# Patient Record
Sex: FEMALE | Race: WHITE | NOT HISPANIC OR LATINO | Employment: FULL TIME | URBAN - METROPOLITAN AREA
[De-identification: names, ages, dates, MRNs, and addresses within clinical notes are randomized per-mention and may not be internally consistent; named-entity substitution may affect disease eponyms.]

---

## 2017-03-01 ENCOUNTER — OFFICE VISIT (OUTPATIENT)
Dept: DERMATOLOGY | Facility: CLINIC | Age: 52
End: 2017-03-01
Payer: COMMERCIAL

## 2017-03-01 DIAGNOSIS — D48.5 NEOPLASM OF UNCERTAIN BEHAVIOR OF SKIN OF TRUNK: ICD-10-CM

## 2017-03-01 DIAGNOSIS — L57.0 ACTINIC KERATOSES: ICD-10-CM

## 2017-03-01 DIAGNOSIS — Z85.828 HISTORY OF NONMELANOMA SKIN CANCER: Primary | ICD-10-CM

## 2017-03-01 DIAGNOSIS — D48.5 NEOPLASM OF UNCERTAIN BEHAVIOR OF SKIN OF FACE: ICD-10-CM

## 2017-03-01 DIAGNOSIS — Z80.8 FAMILY HISTORY OF MELANOMA: ICD-10-CM

## 2017-03-01 PROCEDURE — 11100 HC DESTRUCT PREMALIGNANT LESION, FIRST: CPT | Mod: 59 | Performed by: DERMATOLOGY

## 2017-03-01 PROCEDURE — 11101 HC DESTRUCT PREMALIGNANT LESION, 2-14: CPT | Performed by: DERMATOLOGY

## 2017-03-01 PROCEDURE — 88305 TISSUE EXAM BY PATHOLOGIST: CPT | Performed by: DERMATOLOGY

## 2017-03-01 PROCEDURE — 17000 DESTRUCT PREMALG LESION: CPT | Performed by: DERMATOLOGY

## 2017-03-01 PROCEDURE — 17003 DESTRUCT PREMALG LES 2-14: CPT | Performed by: DERMATOLOGY

## 2017-03-01 PROCEDURE — 99213 OFFICE O/P EST LOW 20 MIN: CPT | Mod: 25 | Performed by: DERMATOLOGY

## 2017-03-01 RX ORDER — LIDOCAINE HYDROCHLORIDE AND EPINEPHRINE 10; 10 MG/ML; UG/ML
3 INJECTION, SOLUTION INFILTRATION; PERINEURAL ONCE
Qty: 1 ML | Refills: 0 | OUTPATIENT
Start: 2017-03-01 | End: 2017-03-01

## 2017-03-01 NOTE — PATIENT INSTRUCTIONS
Cryotherapy    What is it?    Use of a very cold liquid, such as liquid nitrogen, to freeze and destroy abnormal skin cells that need to be removed    What should I expect?    Tenderness and redness    A small blister that might grow and fill with dark purple blood. There may be crusting.    More than one treatment may be needed if the lesions do not go away.    How do I care for the treated area?    Gently wash the area with your hands when bathing.    Use a thin layer of Vaseline to help with healing. You may use a Band-Aid.     The area should heal within 7-10 days and may leave behind a pink or lighter color.     Do not use an antibiotic or Neosporin ointment.     You may take acetaminophen (Tylenol) for pain.     Call your Doctor if you have:    Severe pain    Signs of infection (warmth, redness, cloudy yellow drainage, and or a bad smell)    Questions or concerns    Who should I call with questions?       [facility]: [phone]       [facility]: [phone]       For urgent needs outside of business hours call the [facility] at [phone]        and ask for the dermatology resident on call      Wound Care After a Biopsy    What is a skin biopsy?  A skin biopsy allows the doctor to examine a very small piece of tissue under the microscope to determine the diagnosis and the best treatment for the skin condition. A local anesthetic (numbing medicine)  is injected with a very small needle into the skin area to be tested. A small piece of skin is taken from the area. Sometimes a suture (stitch) is used.     What are the risks of a skin biopsy?  I will experience scar, bleeding, swelling, pain, crusting and redness. I may experience incomplete removal or recurrence. Risks of this procedure are excessive bleeding, bruising, infection, nerve damage, numbness, thick (hypertrophic or keloidal) scar and non-diagnostic biopsy.    How should I care  for my wound for the first 24 hours?    Keep the wound dry and covered for 24 hours    If it bleeds, hold direct pressure on the area for 15 minutes. If bleeding does not stop then go to the emergency room    Avoid strenuous exercise the first 1-2 days or as your doctor instructs you    How should I care for the wound after 24 hours?    After 24 hours, remove the bandage    You may bathe or shower as normal    If you had a scalp biopsy, you can shampoo as usual and can use shower water to clean the biopsy site daily    Clean the wound twice a day with gentle soap and water    Do not scrub, be gentle    Apply white petroleum/Vaseline after cleaning the wound with a cotton swab or a clean finger, and keep the site covered with a Bandaid /bandage. Bandages are not necessary with a scalp biopsy    If you are unable to cover the site with a Bandaid /bandage, re-apply ointment 2-3 times a day to keep the site moist. Moisture will help with healing    Avoid strenuous activity for first 1-2 days    Avoid lakes, rivers, pools, and oceans until the stitches are removed or the site is healed    How do I clean my wound?    Wash hands for 15 minutes with soap or use hand  before all wound care    Clean the wound with gentle soap and water    Apply white petroleum/Vaseline  to wound after it is clean    Replace the Bandaid /bandage to keep the wound covered for the first few days or as instructed by your doctor    If you had a scalp biopsy, warm shower water to the area on a daily basis should suffice    What should I use to clean my wound?     Cotton-tipped applicators (Qtips )    White petroleum jelly (Vaseline ). Use a clean new container and use Q-tips to apply.    Bandaids   as needed    Gentle soap     How should I care for my wound long term?    Do not get your wound dirty    Keep up with wound care for one week or until the area is healed.    A small scab will form and fall off by itself when the area is  completely healed. The area will be red and will become pink in color as it heals. Sun protection is very important for how your scar will turn out. Sunscreen with an SPF 30 or greater is recommended once the area is healed.    If you have stitches, stitches need to be removed in 5-7 days. You may return to our clinic for this or you may have it done locally at your doctor s office.    You should have some soreness but it should be mild and slowly go away over several days. Talk to your doctor about using tylenol for pain,    When should I call my doctor?  If you have increased:     Pain or swelling    Pus or drainage (clear or slightly yellow drainage is ok)    Temperature over 100F    Spreading redness or warmth around wound    When will I hear about my results?  The biopsy results can take 2-3 weeks to come back. The clinic will call you with the results, send you a Join The Wellness Team message, or have you schedule a follow-up clinic or phone time to discuss the results. Contact our clinics if you do not hear from us in 3 weeks.     Who should I call with questions?    [facility]: [phone]     [facility]: [phone]    For urgent needs outside of business hours call the [facility] at [phone] and ask for the dermatology resident on call

## 2017-03-01 NOTE — PROGRESS NOTES
[facility] Dermatology Note      Dermatology Problem List:  1. 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 Actinic Keratosis  -s/p Efudex 3/2015  -s/p cryotherapy  3. Seborrheic dermatitis  4. Family history of melaoma  5. NUB X2: 3/01/2017  -On right cheek. DDx: sebaceous hyperplasia, BCC, SK, AK, acne, rosacea.  -s/p Punch biopsy 3/01/2017  -On the right chest. DDx: BCC, SK, infundibular cyst.   -s/p Shave biopsy 3/01/2017    Encounter Date: Mar 1, 2017    CC:  Chief Complaint   Patient presents with     Light/dark Spots         History of Present Illness:  This 52 year old female presents as a follow up for a history of NMSC and fam hx of melanoma in mother. The patient was last seen 12/22/2015  By Dr. Peters when a shave biopsy was done for an AK on the right forehead. Today, the patient reports she has a spot on her chest near a site that was previously excised for BCC. It became swollen, tender, and sore earlier this month. In addition, pt has several spots on her face that are irritating and sore. Some are crusty. Pt is otherwise well with no additional skin concerns at this time. Pt hasn't had a skin check in 2 years and she declined a skin check today but she knows she needs to do it given her history of nmsc and family hx of melanomas.     Past Medical History:   Past Medical History   Diagnosis Date     Allergy, unspecified not elsewhere classified 2003     s/p desensitization     Anxiety      Atrial tachycardia (H)      ablation 3/24/11     Bartholin's abscess 7/09     Basal cell carcinoma of anterior chest s/p excision 11-4-14 11/4/2014     Bradycardia      Excessive or frequent menstruation 2001     s/p TVH     Transient disorder of initiating or maintaining sleep 2005     Past Surgical History   Procedure  Laterality Date     C ligate fallopian tube       Hc revise median n/carpal tunnel surg       bilateral carpal tunnel repair     Hysterectomy, vaginal  1/30/01     TVH for menorraghia     Surgical history of -   8/09     Bilat LE Laser ablation, varicose veins     Hysterectomy, pap no longer indicated  2001     Surgical history of -   3/24/11     atrial ablation     Repair hammer toe  12/19/2011     Procedure:REPAIR HAMMER TOE; Arthroplasty PIPJ 5th toe, left; Surgeon:VINAYAK PALACIOS; Location:[facility] OR     Bunionectomy  12/2/2013     Procedure: BUNIONECTOMY;  Bunionectomy, resection of arthroplasty of PIP joint right foot;  Surgeon: Vinayak Palacios DPM;  Location: [facility] OR     Arthroplasty toe(s)  12/2/2013     Procedure: ARTHROPLASTY TOE(S);;  Surgeon: Vinayak Palacios DPM;  Location:  OR     Social History:  The patient works as a . The patient uses 1-2  alcoholic beverages per week.     Family History:  There is a family history of skin cancer in the patient's mother.  There is a family history of melanoma in the patient's mother.    Medications:  No current outpatient prescriptions on file.          Allergies   Allergen Reactions     No Known Drug Allergies          Review of Systems:  -Const: The patient is generally feeling well today.   -Skin: As above in HPI. No additional skin concerns. No other lesions that are spontaneously bleeding, ulcerating, or not healing.      Physical exam:  GEN: This is a well-nourished, well developed female in no acute distress  NEURO: Alert and oriented  PSYCH: in a pleasant mood, appropriate affect  -Focused exam of the head/face, neck, and chest.   -On the right chest, just lateral to a NMSC scar is a pink slightly eroded papule.   -There are erythematous macules with overyling adherent scale on the center chest and face.   -A pink papule on the right cheek with possible arborizing telangiectasia   -No other lesions of concern on areas examined.      Impression/Plan:  1. Family history of melanoma: need a FBSE within the year.  2. History of nonmelanoma skin cancer, no clincial evidence or recurrence over the right chest except for the lesion of concern that pt came in form. Needs a FBSE within a year.  3. Neoplasm of uncertain behavior on right cheek. Differential diagnosis includes sebaceous hyperplasia, BCC, SK, AK, acne, rosacea.    Punch biopsy on right cheek:  After discussion of benefits and risks including but not limited to bleeding/bruising, pain/swelling, infection, scar, incomplete removal, nerve damage/numbness, recurrence, and non-diagnostic biopsy, written consent, verbal consent and photographs were obtained. Time-out was performed. The area was cleaned with isopropyl alcohol. 0.5mL of 1% lidocaine with epinephrine was injected to obtain adequate anesthesia of the lesion on the right cheek. A 2 mm punch biopsy was performed.  4-0 prolene sutures were utilized to approximate the epidermal edges.  White petroleum jelly/VaselineTM and a bandage was applied to the wound.  Explicit verbal and written wound care instructions were provided.  The patient left the Dermatology Clinic in good condition. The patient was counseled to follow up for suture removal in approximately 5-7 days.  4. Neoplasm of uncertain behavior on the right chest. Differential diagnosis includes BCC, SK, infundibular cyst.     Shave biopsy on right chest: After discussion of benefits and risks including but not limited to bleeding, infection, scar, incomplete removal, recurrence, and non-diagnostic biopsy, written consent, verbal consent and photographs were obtained. Time-out was performed. The area was cleaned with isopropyl alcohol. 0.5 mL of 1% lidocaine with epinephrine was injected to obtain adequate anesthesia of the lesion on the right chest. A shave biopsy was performed. Hemostasis was achieved with aluminium chloride. Vaseline and a sterile dressing were applied. The  patient tolerated the procedure and no complications were noted. The patient was provided with verbal and written post care instructions.   5. Actinic keratosis: chest and left cheek    Cryotherapy procedure note: After verbal consent and discussion of risks and benefits including but no limited to dyspigmentation/scar, blister, and pain, 2 were treated with 1-2mm freeze border for 2 cycles with liquid nitrogen. Post cryotherapy instructions were provided.       Follow up in 1 week for suture removal. Within the year for skin check.     Staff Involved:  Scribe/Staff        Scribe Disclosure:   I, Jimenez Green, am serving as a scribe to document services personally performed by Dr. Telly Krishnan, based on data collection and the provider's statements to me.     Provider Disclosure:   I have reviewed the documentation recorded by the scribe and have edited it as needed. I have personally performed the services documented here and the documentation accurately represents those services and the decisions made by me.     Telly Krishnan MD, MS    Department of Dermatology  [facility]: Phone: [phone], Fax:[phone]  [facility]: Phone: [phone], Fax: [phone]

## 2017-03-01 NOTE — MR AVS SNAPSHOT
After Visit Summary   3/1/2017    Oumou Carolina    MRN: [id number]           Patient Information     Date Of Birth          1965        Visit Information        Provider Department      3/1/2017 9:15 AM Telly Krishnan MD [facility]        Today's Diagnoses     History of nonmelanoma skin cancer    -  1    Family history of melanoma        Neoplasm of uncertain behavior of skin of face        Actinic keratoses        Neoplasm of uncertain behavior of skin of trunk          Care Instructions    Cryotherapy    What is it?    Use of a very cold liquid, such as liquid nitrogen, to freeze and destroy abnormal skin cells that need to be removed    What should I expect?    Tenderness and redness    A small blister that might grow and fill with dark purple blood. There may be crusting.    More than one treatment may be needed if the lesions do not go away.    How do I care for the treated area?    Gently wash the area with your hands when bathing.    Use a thin layer of Vaseline to help with healing. You may use a Band-Aid.     The area should heal within 7-10 days and may leave behind a pink or lighter color.     Do not use an antibiotic or Neosporin ointment.     You may take acetaminophen (Tylenol) for pain.     Call your Doctor if you have:    Severe pain    Signs of infection (warmth, redness, cloudy yellow drainage, and or a bad smell)    Questions or concerns    Who should I call with questions?       [facility]: [phone]       [facility]: [phone]       For urgent needs outside of business hours call the [facility] at [phone]        and ask for the dermatology resident on call      Wound Care After a Biopsy    What is a skin biopsy?  A skin biopsy allows the doctor to examine a very small piece of tissue under the microscope to determine the diagnosis and the best treatment for the skin  condition. A local anesthetic (numbing medicine)  is injected with a very small needle into the skin area to be tested. A small piece of skin is taken from the area. Sometimes a suture (stitch) is used.     What are the risks of a skin biopsy?  I will experience scar, bleeding, swelling, pain, crusting and redness. I may experience incomplete removal or recurrence. Risks of this procedure are excessive bleeding, bruising, infection, nerve damage, numbness, thick (hypertrophic or keloidal) scar and non-diagnostic biopsy.    How should I care for my wound for the first 24 hours?    Keep the wound dry and covered for 24 hours    If it bleeds, hold direct pressure on the area for 15 minutes. If bleeding does not stop then go to the emergency room    Avoid strenuous exercise the first 1-2 days or as your doctor instructs you    How should I care for the wound after 24 hours?    After 24 hours, remove the bandage    You may bathe or shower as normal    If you had a scalp biopsy, you can shampoo as usual and can use shower water to clean the biopsy site daily    Clean the wound twice a day with gentle soap and water    Do not scrub, be gentle    Apply white petroleum/Vaseline after cleaning the wound with a cotton swab or a clean finger, and keep the site covered with a Bandaid /bandage. Bandages are not necessary with a scalp biopsy    If you are unable to cover the site with a Bandaid /bandage, re-apply ointment 2-3 times a day to keep the site moist. Moisture will help with healing    Avoid strenuous activity for first 1-2 days    Avoid lakes, rivers, pools, and oceans until the stitches are removed or the site is healed    How do I clean my wound?    Wash hands for 15 minutes with soap or use hand  before all wound care    Clean the wound with gentle soap and water    Apply white petroleum/Vaseline  to wound after it is clean    Replace the Bandaid /bandage to keep the wound covered for the first few days or  as instructed by your doctor    If you had a scalp biopsy, warm shower water to the area on a daily basis should suffice    What should I use to clean my wound?     Cotton-tipped applicators (Qtips )    White petroleum jelly (Vaseline ). Use a clean new container and use Q-tips to apply.    Bandaids   as needed    Gentle soap     How should I care for my wound long term?    Do not get your wound dirty    Keep up with wound care for one week or until the area is healed.    A small scab will form and fall off by itself when the area is completely healed. The area will be red and will become pink in color as it heals. Sun protection is very important for how your scar will turn out. Sunscreen with an SPF 30 or greater is recommended once the area is healed.    If you have stitches, stitches need to be removed in 5-7 days. You may return to our clinic for this or you may have it done locally at your doctor s office.    You should have some soreness but it should be mild and slowly go away over several days. Talk to your doctor about using tylenol for pain,    When should I call my doctor?  If you have increased:     Pain or swelling    Pus or drainage (clear or slightly yellow drainage is ok)    Temperature over 100F    Spreading redness or warmth around wound    When will I hear about my results?  The biopsy results can take 2-3 weeks to come back. The clinic will call you with the results, send you a [employer] message, or have you schedule a follow-up clinic or phone time to discuss the results. Contact our clinics if you do not hear from us in 3 weeks.     Who should I call with questions?    [facility]: [phone]     [facility]: [phone]    For urgent needs outside of business hours call the [facility] at [phone] and ask for the dermatology resident on call          Follow-ups after your visit        Follow-up notes from your care team      Return if symptoms worsen or fail to improve.      Your next 10 appointments already scheduled     Mar 01, 2017  9:15 AM CST   Return Visit with Telly Krishnan MD   [facility]    [address] [address] [place] [id number] [phone]              Who to contact     If you have questions or need follow up information about today's clinic visit or your schedule please contact [facility] directly at [phone].  Normal or non-critical lab and imaging results will be communicated to you by [employer], letter or phone within 4 business days after the clinic has received the results. If you do not hear from us within 7 days, please contact the clinic through [employer] or phone. If you have a critical or abnormal lab result, we will notify you by phone as soon as possible.  Submit refill requests through [employer] or call your pharmacy and they will forward the refill request to us. Please allow 3 business days for your refill to be completed.          Additional Information About Your Visit        [employer] Information     [employer] gives you secure access to your electronic health record. If you see a primary care provider, you can also send messages to your care team and make appointments. If you have questions, please call your primary care clinic.  If you do not have a primary care provider, please call [phone] and they will assist you.      [employer] is an electronic gateway that provides easy, online access to your medical records. With [employer], you can request a clinic appointment, read your test results, renew a prescription or communicate with your care team.     To access your existing account, please contact your [facility] or call [phone] for assistance.        Care EveryWhere ID     This is your Care EveryWhere ID. This could be used by other organizations to access your [facility]  records  [id number]         Blood Pressure from Last 3 Encounters:   11/28/16 126/65   12/07/15 138/81   11/25/15 110/78    Weight from Last 3 Encounters:   11/28/16 75.8 kg (167 lb)   12/07/15 81.6 kg (180 lb)   02/18/15 78 kg (172 lb)              We Performed the Following     BIOPSY SKIN/SUBQ/MUC MEM, EACH ADDTL LESION     BIOPSY SKIN/SUBQ/MUC MEM, SINGLE LESION     DESTRUCT PREMALIGNANT LESION, 2-14     DESTRUCT PREMALIGNANT LESION, FIRST     Surgical pathology exam          Today's Medication Changes          These changes are accurate as of: 3/1/17  8:47 AM.  If you have any questions, ask your nurse or doctor.               Start taking these medicines.        Dose/Directions    lidocaine 1% with EPINEPHrine 1:100,000 1 %-1:300173 injection   Used for:  Neoplasm of uncertain behavior of skin of face, Neoplasm of uncertain behavior of skin of trunk        Dose:  3 mL   Inject 3 mLs into the skin once for 1 dose   Quantity:  1 mL   Refills:  0            Where to get your medicines      Some of these will need a paper prescription and others can be bought over the counter.  Ask your nurse if you have questions.     You don't need a prescription for these medications     lidocaine 1% with EPINEPHrine 1:100,000 1 %-1:362475 injection                Primary Care Provider Office Phone # Fax #    TORIBIO Rowe [facility] [phone]       [facility] [address] [facility] [address]        Thank you!     Thank you for choosing [facility]  for your care. Our goal is always to provide you with excellent care. Hearing back from our patients is one way we can continue to improve our services. Please take a few minutes to complete the written survey that you may receive in the mail after your visit with us. Thank you!             Your Updated Medication List - Protect others around you: Learn how to safely use, store and throw away your medicines at www.disposemymeds.org.           This list is accurate as of: 3/1/17  8:47 AM.  Always use your most recent med list.                   Brand Name Dispense Instructions for use    lidocaine 1% with EPINEPHrine 1:100,000 1 %-1:838489 injection     1 mL    Inject 3 mLs into the skin once for 1 dose

## 2017-03-01 NOTE — NURSING NOTE
Dermatology Rooming Note    Oumou Carolina's goals for this visit include:   Chief Complaint   Patient presents with     Light/dark Spots       Is a scribe okay for this visit:YES,     Are records needed for this visit(If yes, obtain release of information): No,      Vitals: There were no vitals taken for this visit.    Referring Provider:  ESTABLISHED PATIENT  No address on file

## 2017-03-06 LAB — COPATH REPORT: NORMAL

## 2017-03-08 ENCOUNTER — ALLIED HEALTH/NURSE VISIT (OUTPATIENT)
Dept: NURSING | Facility: CLINIC | Age: 52
End: 2017-03-08
Payer: COMMERCIAL

## 2017-03-08 DIAGNOSIS — Z48.02 VISIT FOR SUTURE REMOVAL: Primary | ICD-10-CM

## 2017-03-08 PROCEDURE — 99207 ZZC NO CHARGE NURSE ONLY: CPT

## 2017-03-08 NOTE — MR AVS SNAPSHOT
After Visit Summary   3/8/2017    Oumou Carolina    MRN: [id number]           Patient Information     Date Of Birth          1965        Visit Information        Provider Department      3/8/2017 8:00 AM NURSE ONLY [facility]        Today's Diagnoses     Visit for suture removal    -  1       Follow-ups after your visit        Your next 10 appointments already scheduled     Jun 20, 2017  8:45 AM CDT   Return Visit with Sonal Peters MD   [facility]    [id number] [place] [id number] [phone]              Who to contact     If you have questions or need follow up information about today's clinic visit or your schedule please contact [facility] directly at [phone].  Normal or non-critical lab and imaging results will be communicated to you by [employer], letter or phone within 4 business days after the clinic has received the results. If you do not hear from us within 7 days, please contact the clinic through [employer] or phone. If you have a critical or abnormal lab result, we will notify you by phone as soon as possible.  Submit refill requests through [employer] or call your pharmacy and they will forward the refill request to us. Please allow 3 business days for your refill to be completed.          Additional Information About Your Visit        [employer] Information     [employer] gives you secure access to your electronic health record. If you see a primary care provider, you can also send messages to your care team and make appointments. If you have questions, please call your primary care clinic.  If you do not have a primary care provider, please call [phone] and they will assist you.      [employer] is an electronic gateway that provides easy, online access to your medical records. With [employer], you can request a clinic appointment, read your test results, renew a prescription or  communicate with your care team.     To access your existing account, please contact your [facility] or call [phone] for assistance.        Care EveryWhere ID     This is your Care EveryWhere ID. This could be used by other organizations to access your [place] medical records  [id number]         Blood Pressure from Last 3 Encounters:   11/28/16 126/65   12/07/15 138/81   11/25/15 110/78    Weight from Last 3 Encounters:   11/28/16 75.8 kg (167 lb)   12/07/15 81.6 kg (180 lb)   02/18/15 78 kg (172 lb)              Today, you had the following     No orders found for display       Primary Care Provider Office Phone # Fax #    TORIBIO Rowe [facility] [phone]       [facility] [address] [facility] [address]        Thank you!     Thank you for choosing [facility]  for your care. Our goal is always to provide you with excellent care. Hearing back from our patients is one way we can continue to improve our services. Please take a few minutes to complete the written survey that you may receive in the mail after your visit with us. Thank you!             Your Updated Medication List - Protect others around you: Learn how to safely use, store and throw away your medicines at www.disposemymeds.org.      Notice  As of 3/8/2017  8:17 AM    You have not been prescribed any medications.

## 2017-03-08 NOTE — NURSING NOTE
Oumou presents to the clinic today for  removal of suture.  The patient has had the suture in place for 7 days.    There has been no history of infection or drainage.    O: 1 suture is seen located on the right cheek.  The wound is healing well with no signs of infection.    A: Suture removal.    P:  The suture was easily removed today.  Routine wound care discussed.  The patient will follow up as scheduled.    Nicole Medina LPN

## 2017-03-08 NOTE — NURSING NOTE
Oumou Carolina comes into clinic today at the request of Dr. Krishnan for right cheek.        This service provided today was under the direct supervision of Dr. Krishnan, who was available if needed.    Adam Rolon LPN

## 2017-05-30 ENCOUNTER — OFFICE VISIT (OUTPATIENT)
Dept: DERMATOLOGY | Facility: CLINIC | Age: 52
End: 2017-05-30
Payer: COMMERCIAL

## 2017-05-30 DIAGNOSIS — Z85.828 HISTORY OF NONMELANOMA SKIN CANCER: Primary | ICD-10-CM

## 2017-05-30 DIAGNOSIS — L81.4 SOLAR LENTIGINOSIS: ICD-10-CM

## 2017-05-30 DIAGNOSIS — Z87.2 HISTORY OF ACTINIC KERATOSIS: ICD-10-CM

## 2017-05-30 DIAGNOSIS — L90.5 SCAR: ICD-10-CM

## 2017-05-30 DIAGNOSIS — T14.8XXA EXCORIATION: ICD-10-CM

## 2017-05-30 PROCEDURE — 99213 OFFICE O/P EST LOW 20 MIN: CPT | Performed by: DERMATOLOGY

## 2017-05-30 NOTE — PROGRESS NOTES
"[facility] Dermatology Note      Dermatology Problem List:  1.Family history of melanoma (mother)  2. NMSC  -BCC, central abdomen, s/p excision 11/25/2015  -BCC, nodular, left frontal scalp, s/p Mohs 2/10/15  -SCCIS, left chest, s/p excision 2/10/15  -BCC, nodular with fibrosis, right chest, s/p excision 11/4/14   3. Actinic Keratosis  -Previous Tx: Efudex 3/2015, cryotherapy  4. Seborrheic dermatitis    Encounter Date: May 30, 2017    CC:  Chief Complaint   Patient presents with     Derm Problem     skin check, r cheek and left temple and back of rt arm          History of Present Illness:  Ms. Oumou Carolina is a 52 year old female who presents as a follow-up for history of NMSC and family history of melanoma. The patient was last seen 3/1/2017 by Dr. Krishnan when 2 AKs were treated with cryotherapy and a biopsy was performed on the right cheek and right chest. Today, the patient reports that her prior biopsy site on the right cheek  \"broke open\" 2 weeks ago and turned black-blue but now appears to be healing. She has a lesion on the left cheek, previously treated, that is still present. She has a new lesion on the right upper arm she would like evaluated. The patient reports no other lesions of concern.    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 2-10-15     Basal cell carcinoma of left frontal scalp (hairline) s/p mohs 2-10-15     Past Medical History:   Diagnosis Date     Allergy, unspecified not elsewhere classified 2003    s/p desensitization     Anxiety      Atrial tachycardia (H)     ablation 3/24/11     Bartholin's abscess 7/09     Basal cell carcinoma of anterior chest s/p excision 11-4-14 " 11/4/2014     Bradycardia      Excessive or frequent menstruation 2001    s/p TVH     Transient disorder of initiating or maintaining sleep 2005     Past Surgical History:   Procedure Laterality Date     ARTHROPLASTY TOE(S)  12/2/2013    Procedure: ARTHROPLASTY TOE(S);;  Surgeon: Vinayak Palacios DPM;  Location: [facility] OR     BUNIONECTOMY  12/2/2013    Procedure: BUNIONECTOMY;  Bunionectomy, resection of arthroplasty of PIP joint right foot;  Surgeon: Vinayak Palacios DPM;  Location: [facility] OR     C LIGATE FALLOPIAN TUBE       HC REVISE MEDIAN N/CARPAL TUNNEL SURG      bilateral carpal tunnel repair     HYSTERECTOMY, PAP NO LONGER INDICATED  2001     HYSTERECTOMY, VAGINAL  1/30/01    TVH for menorraghia     REPAIR HAMMER TOE  12/19/2011    Procedure:REPAIR HAMMER TOE; Arthroplasty PIPJ 5th toe, left; Surgeon:VINAYAK PALACIOS; Location:[facility]     SURGICAL HISTORY OF -   8/09    Bilat LE Laser ablation, varicose veins     SURGICAL HISTORY OF -   3/24/11    atrial ablation     Social History:  The patient works as a . The patient uses 1-2  alcoholic beverages per week.     Family History:  There is a family history of skin cancer in the patient's mother.  There is a family history of melanoma in the patient's mother.    Medications:  No current outpatient prescriptions on file.     Allergies   Allergen Reactions     No Known Drug Allergies      Review of Systems:  -Const: Denies change in personal or family medical history.   -Skin: As above in HPI. No additional skin concerns.    Physical exam:  GEN: This is a well developed, well-nourished female in no acute distress, in a pleasant mood.    SKIN:   -There are multiple well healed surgical scars without erythema, nodularity or telangiectasias. Declines genital exam. Nails painted  -Well healing scar on the right cheek.   -Excoriation with possibly underlying stuck on papule on the right upper arm.   -Scattered brown macules on sun exposed areas.  -No other  lesions of concern on areas examined.     Impression/Plan:  1. Family history of melanoma    ABCDs of melanoma were discussed and self skin checks were advised.     Recommend sunscreens SPF #30 or greater, protective clothing and avoidance of tanning beds.  2. History of nonmelanoma skin cancer, no clincial evidence of recurrence:  3. History of actinic keratosis, no clinical evidence of recurrence  4. Solar lentigines  5. Healing biopsy site, right cheek. Possible dehis from punch biopsy.     Discussed that site should heal and laser can be used for cosmetic treatment in the future.   6. Excoriation and Favor excoriated seborrheic keratosis, right upper arm.     Recommend Vaseline to the area.     Plan to recheck at follow up visit.     Follow up in 3 months, then 1 year for skin check, earlier for new or changing lesions.     Staff Involved:  Scribe/Staff    Scribe Disclosure:   I, Raquel Phelps, am serving as a scribe to document services personally performed by Dr. Sonal Peters, based on data collection and the provider's statements to me.     Provider Disclosure:   The documentation recorded by the scribe accurately reflects the services I personally performed and the decisions made by me.    Sonal Peters MD    Department of Dermatology  [facility]: Phone: [phone], Fax:[phone]  [facility]: Phone: [phone], Fax: [phone]

## 2017-05-30 NOTE — NURSING NOTE
Dermatology Rooming Note    Oumou Carolina's goals for this visit include:   Chief Complaint   Patient presents with     Derm Problem     skin check, r cheek and left temple and back of rt arm        Is a scribe okay for this visit:YES    Are records needed for this visit(If yes, obtain release of information): No     Vitals: There were no vitals taken for this visit.    Referring Provider:  No referring provider defined for this encounter.

## 2017-05-30 NOTE — LETTER
"5/30/2017       RE: Oumou Carolina  [address] [address]     Dear Colleague,    Thank you for referring your patient, Oumou Carolina, to the [facility]. Please see a copy of my visit note below.    [facility] Dermatology Note      Dermatology Problem List:  1.Family history of melanoma (mother)  2. NMSC  -BCC, central abdomen, s/p excision 11/25/2015  -BCC, nodular, left frontal scalp, s/p Mohs 2/10/15  -SCCIS, left chest, s/p excision 2/10/15  -BCC, nodular with fibrosis, right chest, s/p excision 11/4/14   3. Actinic Keratosis  -Previous Tx: Efudex 3/2015, cryotherapy  4. Seborrheic dermatitis    Encounter Date: May 30, 2017    CC:  Chief Complaint   Patient presents with     Derm Problem     skin check, r cheek and left temple and back of rt arm          History of Present Illness:  Ms. Oumou Carolina is a 52 year old female who presents as a follow-up for history of NMSC and family history of melanoma. The patient was last seen 3/1/2017 by Dr. Krishnan when 2 AKs were treated with cryotherapy and a biopsy was performed on the right cheek and right chest. Today, the patient reports that her prior biopsy site on the right cheek  \"broke open\" 2 weeks ago and turned black-blue but now appears to be healing. She has a lesion on the left cheek, previously treated, that is still present. She has a new lesion on the right upper arm she would like evaluated. The patient reports no other lesions of concern.    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 2-10-15     Basal cell carcinoma of left frontal scalp (hairline) s/p " mohs 2-10-15     Past Medical History:   Diagnosis Date     Allergy, unspecified not elsewhere classified 2003    s/p desensitization     Anxiety      Atrial tachycardia (H)     ablation 3/24/11     Bartholin's abscess 7/09     Basal cell carcinoma of anterior chest s/p excision 11-4-14 11/4/2014     Bradycardia      Excessive or frequent menstruation 2001    s/p TVH     Transient disorder of initiating or maintaining sleep 2005     Past Surgical History:   Procedure Laterality Date     ARTHROPLASTY TOE(S)  12/2/2013    Procedure: ARTHROPLASTY TOE(S);;  Surgeon: Vinayak Palacios DPM;  Location: [facility] OR     BUNIONECTOMY  12/2/2013    Procedure: BUNIONECTOMY;  Bunionectomy, resection of arthroplasty of PIP joint right foot;  Surgeon: Vinayak Palacios DPM;  Location: [facility] OR     C LIGATE FALLOPIAN TUBE       HC REVISE MEDIAN N/CARPAL TUNNEL SURG      bilateral carpal tunnel repair     HYSTERECTOMY, PAP NO LONGER INDICATED  2001     HYSTERECTOMY, VAGINAL  1/30/01    TVH for menorraghia     REPAIR HAMMER TOE  12/19/2011    Procedure:REPAIR HAMMER TOE; Arthroplasty PIPJ 5th toe, left; Surgeon:VINAYAK PALACIOS; Location:[facility]     SURGICAL HISTORY OF -   8/09    Bilat LE Laser ablation, varicose veins     SURGICAL HISTORY OF -   3/24/11    atrial ablation     Social History:  The patient works as a . The patient uses 1-2  alcoholic beverages per week.     Family History:  There is a family history of skin cancer in the patient's mother.  There is a family history of melanoma in the patient's mother.    Medications:  No current outpatient prescriptions on file.     Allergies   Allergen Reactions     No Known Drug Allergies      Review of Systems:  -Const: Denies change in personal or family medical history.   -Skin: As above in HPI. No additional skin concerns.    Physical exam:  GEN: This is a well developed, well-nourished female in no acute distress, in a pleasant mood.    SKIN:   -There are multiple well  healed surgical scars without erythema, nodularity or telangiectasias. Declines genital exam. Nails painted  -Well healing scar on the right cheek.   -Excoriation with possibly underlying stuck on papule on the right upper arm.   -Scattered brown macules on sun exposed areas.  -No other lesions of concern on areas examined.     Impression/Plan:  1. Family history of melanoma    ABCDs of melanoma were discussed and self skin checks were advised.     Recommend sunscreens SPF #30 or greater, protective clothing and avoidance of tanning beds.  2. History of nonmelanoma skin cancer, no clincial evidence of recurrence:  3. History of actinic keratosis, no clinical evidence of recurrence  4. Solar lentigines  5. Healing biopsy site, right cheek. Possible dehis from punch biopsy.     Discussed that site should heal and laser can be used for cosmetic treatment in the future.   6. Excoriation and Favor excoriated seborrheic keratosis, right upper arm.     Recommend Vaseline to the area.     Plan to recheck at follow up visit.     Follow up in 3 months, then 1 year for skin check, earlier for new or changing lesions.     Staff Involved:  Scribe/Staff    Scribe Disclosure:   I, Raquel Phelps, am serving as a scribe to document services personally performed by Dr. Sonal Peters, based on data collection and the provider's statements to me.     Provider Disclosure:   The documentation recorded by the scribe accurately reflects the services I personally performed and the decisions made by me.    Sonal Peters MD    Department of Dermatology  [facility]: Phone: [phone], Fax:[phone]  [facility]: Phone: [phone], Fax: [phone]        Again, thank you for allowing me to participate in the care of your patient.      Sincerely,    Sonal Peters MD

## 2017-05-30 NOTE — MR AVS SNAPSHOT
After Visit Summary   5/30/2017    Oumou Carolina    MRN: [id number]           Patient Information     Date Of Birth          1965        Visit Information        Provider Department      5/30/2017 1:45 PM Sonal Peters MD [facility]        Today's Diagnoses     History of nonmelanoma skin cancer    -  1    Solar lentiginosis        History of actinic keratosis        Scar        Excoriation           Follow-ups after your visit        Who to contact     If you have questions or need follow up information about today's clinic visit or your schedule please contact [facility] directly at [phone].  Normal or non-critical lab and imaging results will be communicated to you by [employer], letter or phone within 4 business days after the clinic has received the results. If you do not hear from us within 7 days, please contact the clinic through [employer] or phone. If you have a critical or abnormal lab result, we will notify you by phone as soon as possible.  Submit refill requests through [employer] or call your pharmacy and they will forward the refill request to us. Please allow 3 business days for your refill to be completed.          Additional Information About Your Visit        MyChart Information     [employer] gives you secure access to your electronic health record. If you see a primary care provider, you can also send messages to your care team and make appointments. If you have questions, please call your primary care clinic.  If you do not have a primary care provider, please call [phone] and they will assist you.      [employer] is an electronic gateway that provides easy, online access to your medical records. With [employer], you can request a clinic appointment, read your test results, renew a prescription or communicate with your care team.     To access your existing account, please contact your [facility] or call  [phone] for assistance.        Care EveryWhere ID     This is your Care EveryWhere ID. This could be used by other organizations to access your [place] medical records  [id number]         Blood Pressure from Last 3 Encounters:   11/28/16 126/65   12/07/15 138/81   11/25/15 110/78    Weight from Last 3 Encounters:   11/28/16 75.8 kg (167 lb)   12/07/15 81.6 kg (180 lb)   02/18/15 78 kg (172 lb)              Today, you had the following     No orders found for display       Primary Care Provider Office Phone # Fax #    TORIBIO Rowe [facility] [phone]       [facility] [address] [facility] [address]        Thank you!     Thank you for choosing [facility]  for your care. Our goal is always to provide you with excellent care. Hearing back from our patients is one way we can continue to improve our services. Please take a few minutes to complete the written survey that you may receive in the mail after your visit with us. Thank you!             Your Updated Medication List - Protect others around you: Learn how to safely use, store and throw away your medicines at www.disposemymeds.org.      Notice  As of 5/30/2017  2:40 PM    You have not been prescribed any medications.

## 2017-06-07 ENCOUNTER — TRANSFERRED RECORDS (OUTPATIENT)
Dept: HEALTH INFORMATION MANAGEMENT | Facility: CLINIC | Age: 52
End: 2017-06-07

## 2017-07-06 ENCOUNTER — MYC MEDICAL ADVICE (OUTPATIENT)
Dept: FAMILY MEDICINE | Facility: CLINIC | Age: 52
End: 2017-07-06

## 2017-08-02 ENCOUNTER — TELEPHONE (OUTPATIENT)
Dept: DERMATOLOGY | Facility: CLINIC | Age: 52
End: 2017-08-02

## 2017-08-02 NOTE — TELEPHONE ENCOUNTER
I left a message for patient to call [facility].  Patient has an appointment with Dr. Peters on August 24, 2017 for skin check.  Provider is not available and appointment needs to be rescheduled.   Patient can reschedule with Dr. Pierre while she is here in clinic or can see one of the dermatologists as listed below.      Call Center - ok for you to notify patient of the following:    We are recruiting dermatologists, but unfortunately we do not have a dermatologist at [place] to reschedule you with at this time.    The following is a list of dermatology providers that will be able to see you during this transition period.  They are able to see your current dermatology medical record.  Hours will vary by location.    MD Gema Hicks PA Kristen McCarthy, PA  [facility]  [address] [place] [address]  [phone]    Shaheen Moreno MD  [facility]  [address] [place] [address]  [phone]    [facility]    Dermatology Department  [address] [place] [address]  [phone]    Nicole Medina LPN

## 2017-08-04 ENCOUNTER — OFFICE VISIT (OUTPATIENT)
Dept: DERMATOLOGY | Facility: CLINIC | Age: 52
End: 2017-08-04
Payer: COMMERCIAL

## 2017-08-04 DIAGNOSIS — L81.4 SOLAR LENTIGINOSIS: ICD-10-CM

## 2017-08-04 DIAGNOSIS — L82.1 SEBORRHEIC KERATOSIS: ICD-10-CM

## 2017-08-04 DIAGNOSIS — D48.5 NEOPLASM OF UNCERTAIN BEHAVIOR OF SKIN: Primary | ICD-10-CM

## 2017-08-04 DIAGNOSIS — L57.0 ACTINIC KERATOSIS: ICD-10-CM

## 2017-08-04 PROCEDURE — 99214 OFFICE O/P EST MOD 30 MIN: CPT | Mod: 25 | Performed by: DERMATOLOGY

## 2017-08-04 PROCEDURE — 88305 TISSUE EXAM BY PATHOLOGIST: CPT | Performed by: DERMATOLOGY

## 2017-08-04 PROCEDURE — 17003 DESTRUCT PREMALG LES 2-14: CPT | Performed by: DERMATOLOGY

## 2017-08-04 PROCEDURE — 17000 DESTRUCT PREMALG LESION: CPT | Performed by: DERMATOLOGY

## 2017-08-04 PROCEDURE — 11100 HC BIOPSY SKIN/SUBQ/MUC MEM, SINGLE LESION: CPT | Mod: 59 | Performed by: DERMATOLOGY

## 2017-08-04 NOTE — PATIENT INSTRUCTIONS
Cryotherapy    What is it?    Use of a very cold liquid, such as liquid nitrogen, to freeze and destroy abnormal skin cells that need to be removed    What should I expect?    Tenderness and redness    A small blister that might grow and fill with dark purple blood. There may be crusting.    More than one treatment may be needed if the lesions do not go away.    How do I care for the treated area?    Gently wash the area with your hands when bathing.    Use a thin layer of Vaseline to help with healing. You may use a Band-Aid.     The area should heal within 7-10 days and may leave behind a pink or lighter color.     Do not use an antibiotic or Neosporin ointment.     You may take acetaminophen (Tylenol) for pain.     Call your Doctor if you have:    Severe pain    Signs of infection (warmth, redness, cloudy yellow drainage, and or a bad smell)    Questions or concerns    Who should I call with questions?       [facility]: [phone]       [facility]: [phone]       For urgent needs outside of business hours call the [facility] at [phone]        and ask for the dermatology resident on call    Wound Care After a Biopsy    What is a skin biopsy?  A skin biopsy allows the doctor to examine a very small piece of tissue under the microscope to determine the diagnosis and the best treatment for the skin condition. A local anesthetic (numbing medicine)  is injected with a very small needle into the skin area to be tested. A small piece of skin is taken from the area. Sometimes a suture (stitch) is used.     What are the risks of a skin biopsy?  I will experience scar, bleeding, swelling, pain, crusting and redness. I may experience incomplete removal or recurrence. Risks of this procedure are excessive bleeding, bruising, infection, nerve damage, numbness, thick (hypertrophic or keloidal) scar and non-diagnostic biopsy.    How should I care  for my wound for the first 24 hours?    Keep the wound dry and covered for 24 hours    If it bleeds, hold direct pressure on the area for 15 minutes. If bleeding does not stop then go to the emergency room    Avoid strenuous exercise the first 1-2 days or as your doctor instructs you    How should I care for the wound after 24 hours?    After 24 hours, remove the bandage    You may bathe or shower as normal    If you had a scalp biopsy, you can shampoo as usual and can use shower water to clean the biopsy site daily    Clean the wound twice a day with gentle soap and water    Do not scrub, be gentle    Apply white petroleum/Vaseline after cleaning the wound with a cotton swab or a clean finger, and keep the site covered with a Bandaid /bandage. Bandages are not necessary with a scalp biopsy    If you are unable to cover the site with a Bandaid /bandage, re-apply ointment 2-3 times a day to keep the site moist. Moisture will help with healing    Avoid strenuous activity for first 1-2 days    Avoid lakes, rivers, pools, and oceans until the stitches are removed or the site is healed    How do I clean my wound?    Wash hands thoroughly with soap or use hand  before all wound care    Clean the wound with gentle soap and water    Apply white petroleum/Vaseline  to wound after it is clean    Replace the Bandaid /bandage to keep the wound covered for the first few days or as instructed by your doctor    If you had a scalp biopsy, warm shower water to the area on a daily basis should suffice    What should I use to clean my wound?     Cotton-tipped applicators (Qtips )    White petroleum jelly (Vaseline ). Use a clean new container and use Q-tips to apply.    Bandaids   as needed    Gentle soap     How should I care for my wound long term?    Do not get your wound dirty    Keep up with wound care for one week or until the area is healed.    A small scab will form and fall off by itself when the area is completely  healed. The area will be red and will become pink in color as it heals. Sun protection is very important for how your scar will turn out. Sunscreen with an SPF 30 or greater is recommended once the area is healed.    You should have some soreness but it should be mild and slowly go away over several days. Talk to your doctor about using tylenol for pain,    When should I call my doctor?  If you have increased:     Pain or swelling    Pus or drainage (clear or slightly yellow drainage is ok)    Temperature over 100F    Spreading redness or warmth around wound    When will I hear about my results?  The biopsy results can take 2-3 weeks to come back. The clinic will call you with the results, send you a Zayo message, or have you schedule a follow-up clinic or phone time to discuss the results. Contact our clinics if you do not hear from us in 3 weeks.     Who should I call with questions?    [facility]: [phone]     [facility]: [phone]    For urgent needs outside of business hours call the [facility] at [phone] and ask for the dermatology resident on call

## 2017-08-04 NOTE — PROGRESS NOTES
[facility] Dermatology Note      Dermatology Problem List:  1.Family history of melanoma (mother)  2. NMSC  -BCC, central abdomen, s/p excision 11/25/2015  -BCC, nodular, left frontal scalp, s/p Mohs 2/10/15  -SCCIS, left chest, s/p excision 2/10/15  -BCC, nodular with fibrosis, right chest, s/p excision 11/4/14   3. Actinic Keratosis  -Previous Tx: Efudex 3/2015, cryotherapy  4. Seborrheic dermatitis    Encounter Date: Aug 4, 2017    CC:  Chief Complaint   Patient presents with     RECHECK     spots right and left upper arm and right temple         History of Present Illness:  Ms. Oumou Carolina is a 52 year old female who presents as a follow-up for lesion on the right upper arm. The patient was last seen 5/30/2017 when a lesion on the right upper arm was identified for recheck. Today, the patient reports that she has not been picking the lesion on the right upper arm. Would also like her right temple evaluated. The patient reports no other lesions of concern.     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 2-10-15     Basal cell carcinoma of left frontal scalp (hairline) s/p mohs 2-10-15     Past Medical History:   Diagnosis Date     Allergy, unspecified not elsewhere classified 2003    s/p desensitization     Anxiety      Atrial tachycardia (H)     ablation 3/24/11     Bartholin's abscess 7/09     Basal cell carcinoma of anterior chest s/p excision 11-4-14 11/4/2014     Bradycardia      Excessive or frequent menstruation 2001    s/p TVH     Transient disorder of initiating or maintaining sleep 2005     Past Surgical History:   Procedure Laterality Date     ARTHROPLASTY TOE(S)  12/2/2013     Procedure: ARTHROPLASTY TOE(S);;  Surgeon: Vinayak Palacios DPM;  Location: [facility] OR     BUNIONECTOMY  12/2/2013    Procedure: BUNIONECTOMY;  Bunionectomy, resection of arthroplasty of PIP joint right foot;  Surgeon: Vinayak Palacios DPM;  Location: [facility] OR     C LIGATE FALLOPIAN TUBE       CARDIAC SURGERY  ablation for abnormal rhythym    2013     COSMETIC SURGERY  abdomiplasty, lipo    6/2016, 4/2017     EYE SURGERY  lasik    11/2007     HC REVISE MEDIAN N/CARPAL TUNNEL SURG      bilateral carpal tunnel repair     HYSTERECTOMY, PAP NO LONGER INDICATED  2001     HYSTERECTOMY, VAGINAL  1/30/01    TVH for menorraghia     REPAIR HAMMER TOE  12/19/2011    Procedure:REPAIR HAMMER TOE; Arthroplasty PIPJ 5th toe, left; Surgeon:VINAYAK PALACIOS; Location:[facility] OR     SURGICAL HISTORY OF -   8/09    Bilat LE Laser ablation, varicose veins     SURGICAL HISTORY OF -   3/24/11    atrial ablation     Social History:  The patient works as a . The patient uses 1-2  alcoholic beverages per week.     Family History:  There is a family history of skin cancer in the patient's mother.  There is a family history of melanoma in the patient's mother.    Medications:  No current outpatient prescriptions on file.     Allergies   Allergen Reactions     No Known Drug Allergies      Review of Systems:  -Skin: As above in HPI. No additional skin concerns.    Physical exam:  GEN: This is a well developed, well-nourished female in no acute distress, in a pleasant mood.    SKIN: Sun-exposed skin, which includes the head/face, neck, both arms, digits, and/or nails was examined.   -Well healed biopsy scar on the right cheek.   -There are erythematous macules with overyling adherent scale on the forehead and right temple.   -9mm pink hyperkeratotic papule with focal erosion on the right posterior upper arm.   -Scattered brown macules on sun exposed areas.  -There are waxy stuck on tan to brown papules on the upper extremiteis.  -No other  lesions of concern on areas examined.     Impression/Plan:  1. Family history of melanoma    Last total skin exam 5/30/2017  2. History of nonmelanoma skin cancer-not addressed at visit  3. Actinic keratosis, right temple, forehead    Cryotherapy procedure note: After verbal consent and discussion of risks and benefits including but no limited to dyspigmentation/scar, blister, and pain, 2 were treated with 1-2mm freeze border for 1 cycle with liquid nitrogen. Post cryotherapy instructions were provided.  4. Seborrheic keratosis, upper extremities    No further intervention required at this time.   5. Solar lentigines  6. Neoplasm of uncertain behavior    9mm pink hyperkeratotic papule with focal erosion, right posterior upper arm. Ddx NMSC versus prurigo nodule    Shave biopsy:  After discussion of benefits and risks including but not limited to bleeding/bruising, pain/swelling, infection, scar, incomplete removal, nerve damage/numbness, recurrence, and non-diagnostic biopsy, written consent, verbal consent and photographs were obtained. Time-out was performed. The area was cleaned with isopropyl alcohol.  was injected to obtain adequate anesthesia of the lesion on the right posterior upper arm. 0.5 ml of 1% lidocaine with 1:100,000 epinephrine was injected to obtain adequate anesthesia. A  shave biopsy was performed. Hemostasis was achieved with aluminium chloride. Vaseline and a sterile dressing were applied. The patient tolerated the procedure and no complications were noted. The patient was provided with verbal and written post care instructions.      Follow up 5/2018 for skin exam earlier for new or changing lesions.     Staff Involved:  Scribe/Staff    Scribe Disclosure:   Raquel MARTINEZ, am serving as a scribe to document services personally performed by Dr. Shawn Pierre, based on data collection and the provider's statements to me.     Shawn MARTINEZ MD, have reviewed the documentation recorded by the  scribe and have edited it as needed. I have personally performed the services documented within the note, which accurately the services rendered and treatment plans formulated during the visit.    Shawn Pierre MD  Dermatologist, [facility]  [address],  [facility] [address]

## 2017-08-04 NOTE — MR AVS SNAPSHOT
After Visit Summary   8/4/2017    Oumou Carolina    MRN: [id number]           Patient Information     Date Of Birth          1965        Visit Information        Provider Department      8/4/2017 9:30 AM Shawn Pierre MD [facility]        Today's Diagnoses     Neoplasm of uncertain behavior of skin    -  1    Actinic keratosis        Solar lentiginosis        Seborrheic keratosis          Care Instructions    Cryotherapy    What is it?    Use of a very cold liquid, such as liquid nitrogen, to freeze and destroy abnormal skin cells that need to be removed    What should I expect?    Tenderness and redness    A small blister that might grow and fill with dark purple blood. There may be crusting.    More than one treatment may be needed if the lesions do not go away.    How do I care for the treated area?    Gently wash the area with your hands when bathing.    Use a thin layer of Vaseline to help with healing. You may use a Band-Aid.     The area should heal within 7-10 days and may leave behind a pink or lighter color.     Do not use an antibiotic or Neosporin ointment.     You may take acetaminophen (Tylenol) for pain.     Call your Doctor if you have:    Severe pain    Signs of infection (warmth, redness, cloudy yellow drainage, and or a bad smell)    Questions or concerns    Who should I call with questions?       [facility]: [phone]       [facility]: [phone]       For urgent needs outside of business hours call the [facility] at [phone]        and ask for the dermatology resident on call    Wound Care After a Biopsy    What is a skin biopsy?  A skin biopsy allows the doctor to examine a very small piece of tissue under the microscope to determine the diagnosis and the best treatment for the skin condition. A local anesthetic (numbing medicine)  is injected with a very small needle into  the skin area to be tested. A small piece of skin is taken from the area. Sometimes a suture (stitch) is used.     What are the risks of a skin biopsy?  I will experience scar, bleeding, swelling, pain, crusting and redness. I may experience incomplete removal or recurrence. Risks of this procedure are excessive bleeding, bruising, infection, nerve damage, numbness, thick (hypertrophic or keloidal) scar and non-diagnostic biopsy.    How should I care for my wound for the first 24 hours?    Keep the wound dry and covered for 24 hours    If it bleeds, hold direct pressure on the area for 15 minutes. If bleeding does not stop then go to the emergency room    Avoid strenuous exercise the first 1-2 days or as your doctor instructs you    How should I care for the wound after 24 hours?    After 24 hours, remove the bandage    You may bathe or shower as normal    If you had a scalp biopsy, you can shampoo as usual and can use shower water to clean the biopsy site daily    Clean the wound twice a day with gentle soap and water    Do not scrub, be gentle    Apply white petroleum/Vaseline after cleaning the wound with a cotton swab or a clean finger, and keep the site covered with a Bandaid /bandage. Bandages are not necessary with a scalp biopsy    If you are unable to cover the site with a Bandaid /bandage, re-apply ointment 2-3 times a day to keep the site moist. Moisture will help with healing    Avoid strenuous activity for first 1-2 days    Avoid lakes, rivers, pools, and oceans until the stitches are removed or the site is healed    How do I clean my wound?    Wash hands thoroughly with soap or use hand  before all wound care    Clean the wound with gentle soap and water    Apply white petroleum/Vaseline  to wound after it is clean    Replace the Bandaid /bandage to keep the wound covered for the first few days or as instructed by your doctor    If you had a scalp biopsy, warm shower water to the area on a  daily basis should suffice    What should I use to clean my wound?     Cotton-tipped applicators (Qtips )    White petroleum jelly (Vaseline ). Use a clean new container and use Q-tips to apply.    Bandaids   as needed    Gentle soap     How should I care for my wound long term?    Do not get your wound dirty    Keep up with wound care for one week or until the area is healed.    A small scab will form and fall off by itself when the area is completely healed. The area will be red and will become pink in color as it heals. Sun protection is very important for how your scar will turn out. Sunscreen with an SPF 30 or greater is recommended once the area is healed.    You should have some soreness but it should be mild and slowly go away over several days. Talk to your doctor about using tylenol for pain,    When should I call my doctor?  If you have increased:     Pain or swelling    Pus or drainage (clear or slightly yellow drainage is ok)    Temperature over 100F    Spreading redness or warmth around wound    When will I hear about my results?  The biopsy results can take 2-3 weeks to come back. The clinic will call you with the results, send you a mychart message, or have you schedule a follow-up clinic or phone time to discuss the results. Contact our clinics if you do not hear from us in 3 weeks.     Who should I call with questions?    [facility]: [phone]     [facility]: [phone]    For urgent needs outside of business hours call the [facility] at [phone] and ask for the dermatology resident on call              Follow-ups after your visit        Who to contact     If you have questions or need follow up information about today's clinic visit or your schedule please contact [facility] directly at [phone].  Normal or non-critical lab and imaging results will be communicated to you by MyChart, letter or  phone within 4 business days after the clinic has received the results. If you do not hear from us within 7 days, please contact the clinic through [employer] or phone. If you have a critical or abnormal lab result, we will notify you by phone as soon as possible.  Submit refill requests through [employer] or call your pharmacy and they will forward the refill request to us. Please allow 3 business days for your refill to be completed.          Additional Information About Your Visit        [employer] Information     [employer] gives you secure access to your electronic health record. If you see a primary care provider, you can also send messages to your care team and make appointments. If you have questions, please call your primary care clinic.  If you do not have a primary care provider, please call [phone] and they will assist you.      [employer] is an electronic gateway that provides easy, online access to your medical records. With [employer], you can request a clinic appointment, read your test results, renew a prescription or communicate with your care team.     To access your existing account, please contact your [facility] or call [phone] for assistance.        Care EveryWhere ID     This is your Care EveryWhere ID. This could be used by other organizations to access your [facility] medical records  [id number]         Blood Pressure from Last 3 Encounters:   11/28/16 126/65   12/07/15 138/81   11/25/15 110/78    Weight from Last 3 Encounters:   11/28/16 75.8 kg (167 lb)   12/07/15 81.6 kg (180 lb)   02/18/15 78 kg (172 lb)              Today, you had the following     No orders found for display       Primary Care Provider Office Phone # Fax #    TORIBIO [facility] [phone]       [facility] [address] [facility] [address]        Equal Access to Services     LANG CHACKO : Ashish Corado, brenda dumont, mora peck  mayank gonzalezlaurenfermín la'aatacho ah. [facility] [phone].    ATENCIÓN: Si habla español, tiene a avendano disposición servicios gratuitos de asistencia lingüística. Desire al [phone].    We comply with applicable federal civil rights laws and [place] laws. We do not discriminate on the basis of race, color, national origin, age, disability sex, sexual orientation or gender identity.            Thank you!     Thank you for choosing [facility]  for your care. Our goal is always to provide you with excellent care. Hearing back from our patients is one way we can continue to improve our services. Please take a few minutes to complete the written survey that you may receive in the mail after your visit with us. Thank you!             Your Updated Medication List - Protect others around you: Learn how to safely use, store and throw away your medicines at www.disposemymeds.org.      Notice  As of 8/4/2017  9:53 AM    You have not been prescribed any medications.

## 2017-08-04 NOTE — LETTER
8/4/2017      RE: Oumou Carolina  [address] [facility] [address]       [facility] Dermatology Note      Dermatology Problem List:  1.Family history of melanoma (mother)  2. NMSC  -BCC, central abdomen, s/p excision 11/25/2015  -BCC, nodular, left frontal scalp, s/p Mohs 2/10/15  -SCCIS, left chest, s/p excision 2/10/15  -BCC, nodular with fibrosis, right chest, s/p excision 11/4/14   3. Actinic Keratosis  -Previous Tx: Efudex 3/2015, cryotherapy  4. Seborrheic dermatitis    Encounter Date: Aug 4, 2017    CC:  Chief Complaint   Patient presents with     RECHECK     spots right and left upper arm and right temple         History of Present Illness:  Ms. Oumou Carolina is a 52 year old female who presents as a follow-up for lesion on the right upper arm. The patient was last seen 5/30/2017 when a lesion on the right upper arm was identified for recheck. Today, the patient reports that she has not been picking the lesion on the right upper arm. Would also like her right temple evaluated. The patient reports no other lesions of concern.     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 2-10-15     Basal cell carcinoma of left frontal scalp (hairline) s/p mohs 2-10-15     Past Medical History:   Diagnosis Date     Allergy, unspecified not elsewhere classified 2003    s/p desensitization     Anxiety      Atrial tachycardia (H)     ablation 3/24/11     Bartholin's abscess 7/09     Basal cell carcinoma of anterior chest s/p excision 11-4-14 11/4/2014     Bradycardia      Excessive or frequent menstruation 2001    s/p TVH     Transient disorder of initiating or maintaining sleep 2005     Past Surgical  History:   Procedure Laterality Date     ARTHROPLASTY TOE(S)  12/2/2013    Procedure: ARTHROPLASTY TOE(S);;  Surgeon: Vinayak Palacios DPM;  Location: [facility] OR     BUNIONECTOMY  12/2/2013    Procedure: BUNIONECTOMY;  Bunionectomy, resection of arthroplasty of PIP joint right foot;  Surgeon: Vinayak Palacios DPM;  Location:  OR     C LIGATE FALLOPIAN TUBE       CARDIAC SURGERY  ablation for abnormal rhythym    2013     COSMETIC SURGERY  abdomiplasty, lipo    6/2016, 4/2017     EYE SURGERY  lasik    11/2007     HC REVISE MEDIAN N/CARPAL TUNNEL SURG      bilateral carpal tunnel repair     HYSTERECTOMY, PAP NO LONGER INDICATED  2001     HYSTERECTOMY, VAGINAL  1/30/01    TVH for menorraghia     REPAIR HAMMER TOE  12/19/2011    Procedure:REPAIR HAMMER TOE; Arthroplasty PIPJ 5th toe, left; Surgeon:VINAYAK PALACIOS; Location:[facility] OR     SURGICAL HISTORY OF -   8/09    Bilat LE Laser ablation, varicose veins     SURGICAL HISTORY OF -   3/24/11    atrial ablation     Social History:  The patient works as a . The patient uses 1-2  alcoholic beverages per week.     Family History:  There is a family history of skin cancer in the patient's mother.  There is a family history of melanoma in the patient's mother.    Medications:  No current outpatient prescriptions on file.     Allergies   Allergen Reactions     No Known Drug Allergies      Review of Systems:  -Skin: As above in HPI. No additional skin concerns.    Physical exam:  GEN: This is a well developed, well-nourished female in no acute distress, in a pleasant mood.    SKIN: Sun-exposed skin, which includes the head/face, neck, both arms, digits, and/or nails was examined.   -Well healed biopsy scar on the right cheek.   -There are erythematous macules with overyling adherent scale on the forehead and right temple.   -9mm pink hyperkeratotic papule with focal erosion on the right posterior upper arm.   -Scattered brown macules on sun exposed areas.  -There are  waxy stuck on tan to brown papules on the upper extremiteis.  -No other lesions of concern on areas examined.     Impression/Plan:  1. Family history of melanoma    Last total skin exam 5/30/2017  2. History of nonmelanoma skin cancer-not addressed at visit  3. Actinic keratosis, right temple, forehead    Cryotherapy procedure note: After verbal consent and discussion of risks and benefits including but no limited to dyspigmentation/scar, blister, and pain, 2 were treated with 1-2mm freeze border for 1 cycle with liquid nitrogen. Post cryotherapy instructions were provided.  4. Seborrheic keratosis, upper extremities    No further intervention required at this time.   5. Solar lentigines  6. Neoplasm of uncertain behavior    9mm pink hyperkeratotic papule with focal erosion, right posterior upper arm. Ddx NMSC versus prurigo nodule    Shave biopsy:  After discussion of benefits and risks including but not limited to bleeding/bruising, pain/swelling, infection, scar, incomplete removal, nerve damage/numbness, recurrence, and non-diagnostic biopsy, written consent, verbal consent and photographs were obtained. Time-out was performed. The area was cleaned with isopropyl alcohol.  was injected to obtain adequate anesthesia of the lesion on the right posterior upper arm. 0.5 ml of 1% lidocaine with 1:100,000 epinephrine was injected to obtain adequate anesthesia. A  shave biopsy was performed. Hemostasis was achieved with aluminium chloride. Vaseline and a sterile dressing were applied. The patient tolerated the procedure and no complications were noted. The patient was provided with verbal and written post care instructions.      Follow up 5/2018 for skin exam earlier for new or changing lesions.     Staff Involved:  Scribe/Staff    Scribe Disclosure:   Raquel MARTINEZ, am serving as a scribe to document services personally performed by Dr. Shawn Pierre, based on data collection and the provider's statements to me.      I, Shawn Pierre MD, have reviewed the documentation recorded by the scribe and have edited it as needed. I have personally performed the services documented within the note, which accurately the services rendered and treatment plans formulated during the visit.    Shawn Pierre MD  Dermatologist, [facility]  [address],  [facility] [address]     Shawn Pierre MD

## 2017-08-04 NOTE — NURSING NOTE
Dermatology Rooming Note    Oumou Carolina's goals for this visit include:   Chief Complaint   Patient presents with     RECHECK     spots right and left upper arm and right temple       Is a scribe okay for this visit:YES    Are records needed for this visit(If yes, obtain release of information): No     Vitals: There were no vitals taken for this visit.    Referring Provider:  ESTABLISHED PATIENT  No address on file    Nicole Medina LPN

## 2017-08-09 LAB — COPATH REPORT: NORMAL

## 2017-08-10 NOTE — PROGRESS NOTES
Path results reviewed: Prurigo nodularis  Normal spot caused by chronic rubbing and scratching of skin. May use triam oint bid and avoid scratching.  Please notify patient of results, plan and send rx if she is interested.  SA

## 2017-09-22 NOTE — PROGRESS NOTES
[facility] [place]  [address], [address]  [place] [address]  [phone]  Dept: [phone]    PRE-OP EVALUATION:  Today's date: 2017    Oumou Carolina (: 1965) presents for pre-operative evaluation assessment as requested by Dr. Ab Michael.  She requires evaluation and anesthesia risk assessment prior to undergoing surgery/procedure for treatment of Hammer toe 4th toe on left foot .  Proposed procedure: Left foot Flexor [employer], 4th toe    Date of Surgery/ Procedure: 10/5/17  Time of Surgery/ Procedure: 2:25 pm  Hospital/Surgical Facility: [facility]  Fax number for surgical facility: [phone]  Primary Physician: Florinda Eaton  Type of Anesthesia Anticipated: to be determined    Patient has a Health Care Directive or Living Will:  NO    1. NO - Do you have a history of heart attack, stroke, stent, bypass or surgery on an artery in the head, neck, heart or legs?  2. NO - Do you ever have any pain or discomfort in your chest?  3. NO - Do you have a history of  Heart Failure?  4. NO - Are you troubled by shortness of breath when: walking on the level, up a slight hill or at night?  5. NO - Do you currently have a cold, bronchitis or other respiratory infection?  6. NO - Do you have a cough, shortness of breath or wheezing?  7. NO - Do you sometimes get pains in the calves of your legs when you walk?  8. NO - Do you or anyone in your family have previous history of blood clots?  9. NO - Do you or does anyone in your family have a serious bleeding problem such as prolonged bleeding following surgeries or cuts?  10. NO - Have you ever had problems with anemia or been told to take iron pills?  11. NO - Have you had any abnormal blood loss such as black, tarry or bloody stools, or abnormal vaginal bleeding?  12. NO - Have you ever had a blood transfusion?  13. NO - Have you or any of your relatives ever had problems with anesthesia?  14. NO - Do you have sleep apnea,  excessive snoring or daytime drowsiness?  15. NO - Do you have any prosthetic heart valves?  16. NO - Do you have prosthetic joints?  17. NO - Is there any chance that you may be pregnant?        HPI:                                                      Brief HPI related to upcoming procedure: Left foot discomfort and pain due to 4th hammertoe, with callous formation needing surgical intervention.       See problem list for active medical problems.  Problems all longstanding and stable, except as noted/documented.  See ROS for pertinent symptoms related to these conditions.                                                                                                  .    MEDICAL HISTORY:                                                    Patient Active Problem List    Diagnosis Date Noted     Elevated blood pressure reading without diagnosis of hypertension 09/28/2017     Priority: Medium     Squamous cell carcinoma in situ left chest s/p excision 2-10-15 02/10/2015     Priority: Medium     Basal cell carcinoma of left frontal scalp (hairline) s/p mohs 2-10-15 02/10/2015     Priority: Medium     Basal cell carcinoma of anterior chest s/p excision 11-4-14 11/04/2014     Priority: Medium     Hx of hysterectomy 11/12/2012     Priority: Medium     Hammer toe 12/12/2011     Priority: Medium     Sinus bradycardia 01/12/2011     Priority: Medium     EKG 1/12/11 due to irregular heart beat       Hyperlipidemia LDL goal <160 10/31/2010     Priority: Medium     Edis Denis 2002 for dietary counseling       Persistent disorder of initiating or maintaining sleep 07/23/2007     Priority: Medium      Past Medical History:   Diagnosis Date     Allergy, unspecified not elsewhere classified 2003    s/p desensitization     Anxiety      Atrial tachycardia (H)     ablation 3/24/11     Bartholin's abscess 7/09     Basal cell carcinoma of anterior chest s/p excision 11-4-14 11/4/2014     Bradycardia      Excessive or frequent  menstruation 2001    s/p TVH     Transient disorder of initiating or maintaining sleep 2005     Past Surgical History:   Procedure Laterality Date     ABDOMEN SURGERY  2001    Hysterectomy     ARTHROPLASTY TOE(S)  12/2/2013    Procedure: ARTHROPLASTY TOE(S);;  Surgeon: Vinayak Palacios DPM;  Location: [facility] OR     BIOPSY  2014, 2016, 2017    skin cancer biopsies, multiple     BUNIONECTOMY  12/2/2013    Procedure: BUNIONECTOMY;  Bunionectomy, resection of arthroplasty of PIP joint right foot;  Surgeon: Vinayak Palacios DPM;  Location: [facility] OR     C LIGATE FALLOPIAN TUBE       CARDIAC SURGERY  ablation for abnormal rhythym    2013     COSMETIC SURGERY  abdomiplasty, lipo    6/2016, 4/2017     EYE SURGERY  lasik    11/2007     HC REVISE MEDIAN N/CARPAL TUNNEL SURG      bilateral carpal tunnel repair     HYSTERECTOMY, PAP NO LONGER INDICATED  2001     HYSTERECTOMY, VAGINAL  1/30/01    TVH for menorraghia     REPAIR HAMMER TOE  12/19/2011    Procedure:REPAIR HAMMER TOE; Arthroplasty PIPJ 5th toe, left; Surgeon:VINAYAK PALACIOS; Location: OR     SURGICAL HISTORY OF -   8/09    Bilat LE Laser ablation, varicose veins     SURGICAL HISTORY OF -   3/24/11    atrial ablation     No current outpatient prescriptions on file.     OTC products: no recent use of OTC ASA, NSAIDS or Steroids and no use of herbal medications or other supplements    Allergies   Allergen Reactions     No Known Drug Allergies       Latex Allergy: NO    Social History   Substance Use Topics     Smoking status: Never Smoker     Smokeless tobacco: Never Used     Alcohol use Yes      Comment: occassional - 1 x mo     History   Drug Use No       REVIEW OF SYSTEMS:                                                    Constitutional, neuro, ENT, endocrine, pulmonary, cardiac, gastrointestinal, genitourinary, musculoskeletal, integument and psychiatric systems are negative, except as otherwise noted.      EXAM:                                                    BP  "(!) 132/92  Pulse 56  Temp 98.4  F (36.9  C) (Temporal)  Resp 16  Ht 5' 6\" (1.676 m)  Wt 173 lb (78.5 kg)  BMI 27.92 kg/m2    GENERAL APPEARANCE: healthy, alert and no distress     EYES: EOMI, PERRL     HENT: ear canals and TM's normal and nose and mouth without ulcers or lesions     NECK: no adenopathy, no asymmetry, masses, or scars and thyroid normal to palpation     RESP: lungs clear to auscultation - no rales, rhonchi or wheezes     CV: regular rates and rhythm, normal S1 S2, no S3 or S4 and no murmur, click or rub     ABDOMEN:  soft, nontender, no HSM or masses and bowel sounds normal     MS: extremities normal- no gross deformities noted, no evidence of inflammation in joints, FROM in all extremities. Focal toe exam not completed.      SKIN: no suspicious lesions or rashes     NEURO: Normal strength and tone, sensory exam grossly normal, mentation intact and speech normal     PSYCH: mentation appears normal. and affect normal/bright    DIAGNOSTICS:                                                    Hemoglobin (indicated for history of anemia or procedure with significant blood loss such as tonsillectomy, major intraperitoneal surgery, vascular surgery, major spine surgery, total joint replacement)    EKG:Sinus bradycardia, no acute ST changes, unchanged from previous tracings.   BMP- for high BP today.     Recent Labs   Lab Test  11/28/16   1145  11/25/13   1517  04/04/13   0812  03/24/11   0645   HGB  13.3  12.6   --   13.1   PLT  181   --    --   195   NA  140   --   139  138   POTASSIUM  3.8   --   3.8  4.0   CR  0.72   --   0.79  0.93        IMPRESSION:                                                    Reason for surgery/procedure: Hammer Toe, left 4th toe  Diagnosis/reason for consult: pre-op clearance, high BP- new, Bradycarida    The proposed surgical procedure is considered LOW-INTERMEDIATE risk.    REVISED CARDIAC RISK INDEX  The patient has the following serious cardiovascular risks for " perioperative complications such as (MI, PE, VFib and 3  AV Block):  No serious cardiac risks  INTERPRETATION: 0 risks: Class I (very low risk - 0.4% complication rate)    The patient has the following additional risks for perioperative complications:  No identified additional risks      ICD-10-CM    1. Preop general physical exam Z01.818 Hemoglobin   2. Hammer toe of left foot M20.42 Hemoglobin     EKG 12-lead complete w/read - Clinics   3. Screen for colon cancer Z12.11 GASTROENTEROLOGY ADULT REF PROCEDURE ONLY   4. Need for prophylactic vaccination and inoculation against influenza Z23 FLU VAC, SPLIT VIRUS IM > 3 YO (QUADRIVALENT) [72918]     Vaccine Administration, Initial [25443]   5. Lipid screening Z13.220 Lipid panel reflex to direct LDL   6. Elevated blood pressure reading without diagnosis of hypertension R03.0 Basic metabolic panel   7. Need for pneumococcal vaccination Z23 Pneumococcal vaccine 13 valent PCV13 IM (Prevnar) [11640]     Vaccine Administration, Each Additional [66865]       RECOMMENDATIONS:                                                          --Patient is to take all scheduled medications on the day of surgery EXCEPT for modifications listed below.    First reading of elevated blood pressure, renal screening today. No treatment being started today. Re-check in 2-3 weeks after surgery with PCP.     Pt. Is highly active and can perform over 4 METS of activity easily.     Screening labs/orders done today.      APPROVAL GIVEN to proceed with proposed procedure, without further diagnostic evaluation       Signed Electronically by: TORIBIO Steen CNP    Copy of this evaluation report is provided to requesting physician.    [facility] Preop Guidelines

## 2017-09-28 ENCOUNTER — OFFICE VISIT (OUTPATIENT)
Dept: FAMILY MEDICINE | Facility: CLINIC | Age: 52
End: 2017-09-28
Payer: COMMERCIAL

## 2017-09-28 ENCOUNTER — TELEPHONE (OUTPATIENT)
Dept: FAMILY MEDICINE | Facility: CLINIC | Age: 52
End: 2017-09-28

## 2017-09-28 VITALS
WEIGHT: 173 LBS | DIASTOLIC BLOOD PRESSURE: 92 MMHG | HEART RATE: 56 BPM | HEIGHT: 66 IN | TEMPERATURE: 98.4 F | SYSTOLIC BLOOD PRESSURE: 132 MMHG | RESPIRATION RATE: 16 BRPM | BODY MASS INDEX: 27.8 KG/M2

## 2017-09-28 DIAGNOSIS — Z23 NEED FOR PROPHYLACTIC VACCINATION AND INOCULATION AGAINST INFLUENZA: ICD-10-CM

## 2017-09-28 DIAGNOSIS — M20.42 HAMMER TOE OF LEFT FOOT: ICD-10-CM

## 2017-09-28 DIAGNOSIS — Z12.11 SCREEN FOR COLON CANCER: ICD-10-CM

## 2017-09-28 DIAGNOSIS — R03.0 ELEVATED BLOOD PRESSURE READING WITHOUT DIAGNOSIS OF HYPERTENSION: ICD-10-CM

## 2017-09-28 DIAGNOSIS — Z01.818 PREOP GENERAL PHYSICAL EXAM: Primary | ICD-10-CM

## 2017-09-28 DIAGNOSIS — Z23 NEED FOR PNEUMOCOCCAL VACCINATION: ICD-10-CM

## 2017-09-28 DIAGNOSIS — Z13.220 LIPID SCREENING: ICD-10-CM

## 2017-09-28 LAB
ANION GAP SERPL CALCULATED.3IONS-SCNC: 11 MMOL/L (ref 3–14)
BUN SERPL-MCNC: 15 MG/DL (ref 7–30)
CALCIUM SERPL-MCNC: 9.1 MG/DL (ref 8.5–10.1)
CHLORIDE SERPL-SCNC: 102 MMOL/L (ref 94–109)
CHOLEST SERPL-MCNC: 190 MG/DL
CO2 SERPL-SCNC: 27 MMOL/L (ref 20–32)
CREAT SERPL-MCNC: 0.67 MG/DL (ref 0.52–1.04)
GFR SERPL CREATININE-BSD FRML MDRD: >90 ML/MIN/1.7M2
GLUCOSE SERPL-MCNC: 80 MG/DL (ref 70–99)
HDLC SERPL-MCNC: 92 MG/DL
HGB BLD-MCNC: 13.7 G/DL (ref 11.7–15.7)
LDLC SERPL CALC-MCNC: 86 MG/DL
NONHDLC SERPL-MCNC: 98 MG/DL
POTASSIUM SERPL-SCNC: 4.1 MMOL/L (ref 3.4–5.3)
SODIUM SERPL-SCNC: 140 MMOL/L (ref 133–144)
TRIGL SERPL-MCNC: 61 MG/DL

## 2017-09-28 PROCEDURE — 90471 IMMUNIZATION ADMIN: CPT | Performed by: NURSE PRACTITIONER

## 2017-09-28 PROCEDURE — 99215 OFFICE O/P EST HI 40 MIN: CPT | Mod: 25 | Performed by: NURSE PRACTITIONER

## 2017-09-28 PROCEDURE — 93000 ELECTROCARDIOGRAM COMPLETE: CPT | Performed by: NURSE PRACTITIONER

## 2017-09-28 PROCEDURE — 36415 COLL VENOUS BLD VENIPUNCTURE: CPT | Performed by: NURSE PRACTITIONER

## 2017-09-28 PROCEDURE — 90670 PCV13 VACCINE IM: CPT | Performed by: NURSE PRACTITIONER

## 2017-09-28 PROCEDURE — 90686 IIV4 VACC NO PRSV 0.5 ML IM: CPT | Performed by: NURSE PRACTITIONER

## 2017-09-28 PROCEDURE — 80048 BASIC METABOLIC PNL TOTAL CA: CPT | Performed by: NURSE PRACTITIONER

## 2017-09-28 PROCEDURE — 85018 HEMOGLOBIN: CPT | Performed by: NURSE PRACTITIONER

## 2017-09-28 PROCEDURE — 80061 LIPID PANEL: CPT | Performed by: NURSE PRACTITIONER

## 2017-09-28 PROCEDURE — 90472 IMMUNIZATION ADMIN EACH ADD: CPT | Performed by: NURSE PRACTITIONER

## 2017-09-28 ASSESSMENT — PAIN SCALES - GENERAL: PAINLEVEL: NO PAIN (0)

## 2017-09-28 NOTE — NURSING NOTE
Prior to injection verified patient identity using patient's name and date of birth.      Screening Questionnaire for Adult Immunization    Are you sick today?   No   Do you have allergies to medications, food, a vaccine component or latex?   No   Have you ever had a serious reaction after receiving a vaccination?   No   Do you have a long-term health problem with heart disease, lung disease, asthma, kidney disease, metabolic disease (e.g. diabetes), anemia, or other blood disorder?   No   Do you have cancer, leukemia, HIV/AIDS, or any other immune system problem?   No   In the past 3 months, have you taken medications that affect  your immune system, such as prednisone, other steroids, or anticancer drugs; drugs for the treatment of rheumatoid arthritis, Crohn s disease, or psoriasis; or have you had radiation treatments?   No   Have you had a seizure, or a brain or other nervous system problem?   No   During the past year, have you received a transfusion of blood or blood     products, or been given immune (gamma) globulin or antiviral drug?   No   For women: Are you pregnant or is there a chance you could become        pregnant during the next month?   No   Have you received any vaccinations in the past 4 weeks?   No     Immunization questionnaire answers were all negative.        Per orders of Florinda Eaton, injection of Prevnar 13 and flu shot  given by Neri Manzo. Patient instructed to remain in clinic for 15 minutes afterwards, and to report any adverse reaction to me immediately.       Screening performed by Neri Manzo on 9/28/2017 at 8:46 AM.

## 2017-09-28 NOTE — NURSING NOTE
"Chief Complaint   Patient presents with     Pre-Op Exam     Panel Management     flu shot, colon/fit, lipids       Initial /90  Pulse 64  Temp 98.4  F (36.9  C) (Temporal)  Resp 16  Ht 5' 6\" (1.676 m)  Wt 173 lb (78.5 kg)  BMI 27.92 kg/m2 Estimated body mass index is 27.92 kg/(m^2) as calculated from the following:    Height as of this encounter: 5' 6\" (1.676 m).    Weight as of this encounter: 173 lb (78.5 kg).  Medication Reconciliation: complete     Neri Manzo MA    "

## 2017-09-28 NOTE — MR AVS SNAPSHOT
After Visit Summary   9/28/2017    Oumou Carolina    MRN: [id number]           Patient Information     Date Of Birth          1965        Visit Information        Provider Department      9/28/2017 7:40 AM Florinda Eaton APRN [facility]        Today's Diagnoses     Preop general physical exam    -  1    Hammer toe of left foot        Screen for colon cancer        Need for prophylactic vaccination and inoculation against influenza        Lipid screening        Elevated blood pressure reading without diagnosis of hypertension          Care Instructions      Before Your Surgery      Call your surgeon if there is any change in your health. This includes signs of a cold or flu (such as a sore throat, runny nose, cough, rash or fever).    Do not smoke, drink alcohol or take over the counter medicine (unless your surgeon or primary care doctor tells you to) for the 24 hours before and after surgery.    If you take prescribed drugs: Follow your doctor s orders about which medicines to take and which to stop until after surgery.    Eating and drinking prior to surgery: follow the instructions from your surgeon    Take a shower or bath the night before surgery. Use the soap your surgeon gave you to gently clean your skin. If you do not have soap from your surgeon, use your regular soap. Do not shave or scrub the surgery site.  Wear clean pajamas and have clean sheets on your bed.           Follow-ups after your visit        Additional Services     GASTROENTEROLOGY ADULT REF PROCEDURE ONLY       Last Lab Result: Creatinine (mg/dL)       Date                     Value                 11/28/2016               0.72             ----------  There is no height or weight on file to calculate BMI.     Needed:  No  Language:  English    Patient will be contacted to schedule procedure.     Please be aware that coverage of these services is subject to the terms and limitations of your  health insurance plan.  Call member services at your health plan with any benefit or coverage questions.  Any procedures must be performed at a [facility] OR coordinated by your clinic's referral office.    Please bring the following with you to your appointment:    (1) Any X-Rays, CTs or MRIs which have been performed.  Contact the facility where they were done to arrange for  prior to your scheduled appointment.    (2) List of current medications   (3) This referral request   (4) Any documents/labs given to you for this referral                  Your next 10 appointments already scheduled     Aug 10, 2018  8:45 AM CDT   Return Visit with Sonal Peters MD   [facility] ([facility])    [id number] [place] [id number] [phone]              Who to contact     If you have questions or need follow up information about today's clinic visit or your schedule please contact [facility] directly at [phone].  Normal or non-critical lab and imaging results will be communicated to you by InstyBookhart, letter or phone within 4 business days after the clinic has received the results. If you do not hear from us within 7 days, please contact the clinic through Lynkt or phone. If you have a critical or abnormal lab result, we will notify you by phone as soon as possible.  Submit refill requests through Explore Engage or call your pharmacy and they will forward the refill request to us. Please allow 3 business days for your refill to be completed.          Additional Information About Your Visit        InstyBookhart Information     Explore Engage gives you secure access to your electronic health record. If you see a primary care provider, you can also send messages to your care team and make appointments. If you have questions, please call your primary care clinic.  If you do not have a primary care provider, please call [phone] and they will assist you.        Care  "EveryWhere ID     This is your Care EveryWhere ID. This could be used by other organizations to access your [facility] medical records  [id number]        Your Vitals Were     Pulse Temperature Respirations Height BMI (Body Mass Index)       56 98.4  F (36.9  C) (Temporal) 16 5' 6\" (1.676 m) 27.92 kg/m2        Blood Pressure from Last 3 Encounters:   09/28/17 (!) 132/92   11/28/16 126/65   12/07/15 138/81    Weight from Last 3 Encounters:   09/28/17 173 lb (78.5 kg)   11/28/16 167 lb (75.8 kg)   12/07/15 180 lb (81.6 kg)              We Performed the Following     Basic metabolic panel     EKG 12-lead complete w/read - Clinics     GASTROENTEROLOGY ADULT REF PROCEDURE ONLY     Hemoglobin     Lipid panel reflex to direct LDL          Today's Medication Changes          These changes are accurate as of: 9/28/17  8:32 AM.  If you have any questions, ask your nurse or doctor.               Stop taking these medicines if you haven't already. Please contact your care team if you have questions.     diclofenac 1 % Gel topical gel   Commonly known as:  VOLTAREN   Stopped by:  Florinda Eaton APRN CNP                    Primary Care Provider Office Phone # Fax #    TORIBIO Rowe [phone] [address] [facility] [address]        Equal Access to Services     [facility] GINNA AH: Hadii aad ku hadasho Soomaali, waaxda luqadaha, qaybta kaalmada lisa, mayank almanza. So [facility] [phone].    ATENCIÓN: Si habla español, tiene a avendano disposición servicios gratuitos de asistencia lingüística. Desire al [phone].    We comply with applicable federal civil rights laws and [place] laws. We do not discriminate on the basis of race, color, national origin, age, disability sex, sexual orientation or gender identity.            Thank you!     Thank you for choosing [facility]  for your care. Our goal is always to provide you with excellent care. Hearing back from our " patients is one way we can continue to improve our services. Please take a few minutes to complete the written survey that you may receive in the mail after your visit with us. Thank you!             Your Updated Medication List - Protect others around you: Learn how to safely use, store and throw away your medicines at www.disposemymeds.org.      Notice  As of 9/28/2017  8:32 AM    You have not been prescribed any medications.

## 2017-09-28 NOTE — PROGRESS NOTES
Injectable Influenza Immunization Documentation    1.  Is the person to be vaccinated sick today?   No    2. Does the person to be vaccinated have an allergy to a component   of the vaccine?   No    3. Has the person to be vaccinated ever had a serious reaction   to influenza vaccine in the past?   No    4. Has the person to be vaccinated ever had Guillain-Barré syndrome?   No    Form completed by Neri Manzo MA

## 2017-10-27 ENCOUNTER — TELEPHONE (OUTPATIENT)
Dept: FAMILY MEDICINE | Facility: CLINIC | Age: 52
End: 2017-10-27

## 2017-10-27 NOTE — TELEPHONE ENCOUNTER
Summary:    Patient is due/failing the following:   COLONOSCOPY    Action needed:   Schedule a colonoscopy     Type of outreach:    Sent letter.    Questions for provider review:    None                                                                                                                                    Yesika Baig       Chart routed to Care Team .    Panel Management Review      Patient has the following on her problem list:     Hypertension   Last three blood pressure readings:  BP Readings from Last 3 Encounters:   09/28/17 (!) 132/92   11/28/16 126/65   12/07/15 138/81     Blood pressure: FAILED    HTN Guidelines:  Age 18-59 BP range:  Less than 140/90  Age 60-85 with Diabetes:  Less than 140/90  Age 60-85 without Diabetes:  less than 150/90        Composite cancer screening  Chart review shows that this patient is due/due soon for the following Colonoscopy

## 2017-10-27 NOTE — LETTER
[facility]  [address]  [place] [address]  Phone: [phone]  Fax: [phone]  October 27, 2017      Oumou Carolina  [address] [place] [address]      Dear Oumou,    We care about your health and have reviewed your health plan including your medical conditions, medications, and lab results.  Based on this review, it is recommended that you follow up regarding the following health topic(s):  -Colon Cancer Screening    We recommend you take the following action(s):  -schedule a COLONOSCOPY to look for colon cancer (due every 10 years or 5 years in higher risk situations.)  Colonoscopies can prevent 90-95% of colon cancer deaths.  Problem lesions can be removed before they ever become cancer.  If you do not wish to do a colonoscopy or cannot afford to do one at this time, there is another option called a Fecal Immunochemical Occult Blood Test (FIT) a take home stool sample kit.  It does not replace the colonoscopy for colorectal cancer screening, but it can detect hidden bleeding in the lower colon.  It does need to be repeated every year and if a positive result is obtained, you would be referred for a colonoscopy.  If you have completed either one of these tests at another facility, please have the records sent to our clinic for our records.     Please call us at the [facility] - [phone] (or use Evoinfinity) to address the above recommendations.     Thank you for trusting [facility] and we appreciate the opportunity to serve you.  We look forward to supporting your healthcare needs in the future.    Healthy Regards,    Your Health Care Team  [facility]

## 2018-01-22 ENCOUNTER — OFFICE VISIT (OUTPATIENT)
Dept: OBGYN | Facility: CLINIC | Age: 53
End: 2018-01-22
Payer: COMMERCIAL

## 2018-01-22 ENCOUNTER — HOSPITAL ENCOUNTER (OUTPATIENT)
Dept: MAMMOGRAPHY | Facility: CLINIC | Age: 53
Discharge: HOME | End: 2018-01-22
Attending: OBSTETRICS & GYNECOLOGY | Admitting: OBSTETRICS & GYNECOLOGY
Payer: COMMERCIAL

## 2018-01-22 VITALS
SYSTOLIC BLOOD PRESSURE: 146 MMHG | RESPIRATION RATE: 18 BRPM | WEIGHT: 175 LBS | HEART RATE: 98 BPM | DIASTOLIC BLOOD PRESSURE: 77 MMHG | TEMPERATURE: 97.1 F | BODY MASS INDEX: 28.12 KG/M2 | HEIGHT: 66 IN

## 2018-01-22 DIAGNOSIS — Z01.419 ENCOUNTER FOR GYNECOLOGICAL EXAMINATION WITHOUT ABNORMAL FINDING: Primary | ICD-10-CM

## 2018-01-22 DIAGNOSIS — Z12.11 SPECIAL SCREENING FOR MALIGNANT NEOPLASMS, COLON: ICD-10-CM

## 2018-01-22 DIAGNOSIS — Z12.31 VISIT FOR SCREENING MAMMOGRAM: ICD-10-CM

## 2018-01-22 PROCEDURE — 77067 SCR MAMMO BI INCL CAD: CPT

## 2018-01-22 PROCEDURE — 99396 PREV VISIT EST AGE 40-64: CPT | Performed by: OBSTETRICS & GYNECOLOGY

## 2018-01-22 NOTE — PROGRESS NOTES
SUBJECTIVE:   CC: Oumou Carolina is an 52 year old woman who presents for preventive health visit.     Healthy Habits:    Do you get at least three servings of calcium containing foods daily (dairy, green leafy vegetables, etc.)? yes    Amount of exercise or daily activities, outside of work: 6 day(s) per week    Problems taking medications regularly No    Medication side effects: No    Have you had an eye exam in the past two years? yes    Do you see a dentist twice per year? yes    Do you have sleep apnea, excessive snoring or daytime drowsiness?no          Today's PHQ-2 Score:   PHQ-2 ( 1999 Pfizer) 1/22/2018 7/4/2017   Q1: Little interest or pleasure in doing things 0 0   Q2: Feeling down, depressed or hopeless 0 0   PHQ-2 Score 0 0   Q1: Little interest or pleasure in doing things - Not at all   Q2: Feeling down, depressed or hopeless - Not at all   PHQ-2 Score - 0         Abuse: Current or Past(Physical, Sexual or Emotional)- No  Do you feel safe in your environment - Yes  Social History   Substance Use Topics     Smoking status: Never Smoker     Smokeless tobacco: Never Used     Alcohol use Yes      Comment: occassional - 1 x mo     If you drink alcohol do you typically have >3 drinks per day or >7 drinks per week? No                     Reviewed orders with patient.  Reviewed health maintenance and updated orders accordingly - Yes  Labs reviewed in EPIC  BP Readings from Last 3 Encounters:   01/22/18 146/77   09/28/17 (!) 132/92   11/28/16 126/65    Wt Readings from Last 3 Encounters:   01/22/18 175 lb (79.4 kg)   09/28/17 173 lb (78.5 kg)   11/28/16 167 lb (75.8 kg)                  Patient Active Problem List   Diagnosis     Persistent disorder of initiating or maintaining sleep     Hyperlipidemia LDL goal <160     Sinus bradycardia     Hammer toe     Hx of hysterectomy     Basal cell carcinoma of anterior chest s/p excision 11-4-14     Squamous cell carcinoma in situ left chest s/p excision 2-10-15      Basal cell carcinoma of left frontal scalp (hairline) s/p mohs 2-10-15     Elevated blood pressure reading without diagnosis of hypertension     Past Surgical History:   Procedure Laterality Date     ABDOMEN SURGERY  2001    Hysterectomy     ARTHROPLASTY TOE(S)  12/2/2013    Procedure: ARTHROPLASTY TOE(S);;  Surgeon: Vinayak Palacios DPM;  Location: [facility] OR     BIOPSY  2014, 2016, 2017    skin cancer biopsies, multiple     BUNIONECTOMY  12/2/2013    Procedure: BUNIONECTOMY;  Bunionectomy, resection of arthroplasty of PIP joint right foot;  Surgeon: Vinayak Palacios DPM;  Location:  OR     C LIGATE FALLOPIAN TUBE       CARDIAC SURGERY  ablation for abnormal rhythym    2013     COSMETIC SURGERY  abdomiplasty, lipo    6/2016, 4/2017     EYE SURGERY  lasik    11/2007     HC REVISE MEDIAN N/CARPAL TUNNEL SURG      bilateral carpal tunnel repair     HYSTERECTOMY, PAP NO LONGER INDICATED  2001     HYSTERECTOMY, VAGINAL  1/30/01    TVH for menorraghia     REPAIR HAMMER TOE  12/19/2011    Procedure:REPAIR HAMMER TOE; Arthroplasty PIPJ 5th toe, left; Surgeon:VINAYAK PALACIOS; Location: OR     SURGICAL HISTORY OF -   8/09    Bilat LE Laser ablation, varicose veins     SURGICAL HISTORY OF -   3/24/11    atrial ablation       Social History   Substance Use Topics     Smoking status: Never Smoker     Smokeless tobacco: Never Used     Alcohol use Yes      Comment: occassional - 1 x mo     Family History   Problem Relation Age of Onset     Obesity Father      CANCER Mother      Skin cancer on lip     Other Cancer Mother      melanoma, basal cell carcinoma, squamous cell skin     Hypertension No family hx of      Coronary Artery Disease No family hx of      DIABETES No family hx of      Hyperlipidemia No family hx of      Breast Cancer No family hx of      Cancer - colorectal No family hx of      Depression/Anxiety No family hx of      CEREBROVASCULAR DISEASE No family hx of      Thyroid Disease No family hx of      Asthma  "No family hx of      Chemical Addiction No family hx of      Known Genetic Syndrome No family hx of      OSTEOPOROSIS No family hx of      Anesthesia Reaction No family hx of      Prostate Cancer No family hx of      Ovarian Cancer No family hx of                Patient over age 50, mutual decision to screen reflected in health maintenance.      Pertinent mammograms are reviewed under the imaging tab.  History of abnormal Pap smear: Status post benign hysterectomy. Health Maintenance and Surgical History updated.    Reviewed and updated as needed this visit by clinical staffTobacco  Allergies  Meds  Med Hx  Surg Hx  Fam Hx  Soc Hx        Reviewed and updated as needed this visit by Provider              ROS:  C: NEGATIVE for fever, chills, change in weight  I: NEGATIVE for worrisome rashes, moles or lesions  E: NEGATIVE for vision changes or irritation  ENT: NEGATIVE for ear, mouth and throat problems  R: NEGATIVE for significant cough or SOB  B: NEGATIVE for masses, tenderness or discharge  CV: NEGATIVE for chest pain, palpitations or peripheral edema  GI: NEGATIVE for nausea, abdominal pain, heartburn, or change in bowel habits  : NEGATIVE for unusual urinary or vaginal symptoms. No vaginal bleeding.  M: NEGATIVE for significant arthralgias or myalgia  N: NEGATIVE for weakness, dizziness or paresthesias  P: NEGATIVE for changes in mood or affect     OBJECTIVE:   /77 (BP Location: Right arm, Patient Position: Chair, Cuff Size: Adult Small)  Pulse 98  Temp 97.1  F (36.2  C) (Tympanic)  Ht 5' 6\" (1.676 m)  Wt 175 lb (79.4 kg)  BMI 28.25 kg/m2  EXAM:  GENERAL APPEARANCE: healthy, alert and no distress  EYES: Eyes grossly normal to inspection, PERRL and conjunctivae and sclerae normal  HENT: ear canals and TM's normal, nose and mouth without ulcers or lesions, oropharynx clear and oral mucous membranes moist  NECK: no adenopathy, no asymmetry, masses, or scars and thyroid normal to palpation  RESP: " "lungs clear to auscultation - no rales, rhonchi or wheezes  BREAST: normal without masses, tenderness or nipple discharge and no palpable axillary masses or adenopathy  CV: regular rate and rhythm, normal S1 S2, no S3 or S4, no murmur, click or rub, no peripheral edema and peripheral pulses strong  ABDOMEN: soft, nontender, no hepatosplenomegaly, no masses and bowel sounds normal   (female): normal female external genitalia, normal urethral meatus, vaginal mucosal atrophy noted, normal post-hysterectomy exam without masses.  and rectal no masses  MS: no musculoskeletal defects are noted and gait is age appropriate without ataxia  SKIN: no suspicious lesions or rashes  NEURO: Normal strength and tone, sensory exam grossly normal, mentation intact and speech normal  PSYCH: mentation appears normal and affect normal/bright    ASSESSMENT/PLAN:       ICD-10-CM    1. Encounter for gynecological examination without abnormal finding Z01.419    2. Special screening for malignant neoplasms, colon Z12.11 Fecal colorectal cancer screen (FIT)       COUNSELING:   Reviewed preventive health counseling, as reflected in patient instructions  Special attention given to:        calcium and Vit D intake       Regular exercise       Healthy diet/nutrition       Vision screening       Colon cancer screening         reports that she has never smoked. She has never used smokeless tobacco.    Estimated body mass index is 28.25 kg/(m^2) as calculated from the following:    Height as of this encounter: 5' 6\" (1.676 m).    Weight as of this encounter: 175 lb (79.4 kg).         Counseling Resources:  ATP IV Guidelines  Pooled Cohorts Equation Calculator  Breast Cancer Risk Calculator  FRAX Risk Assessment  ICSI Preventive Guidelines  Dietary Guidelines for Americans, 2010  USDA's MyPlate  ASA Prophylaxis  Lung CA Screening    Eda Bush MD  [facility]  "

## 2018-01-22 NOTE — MR AVS SNAPSHOT
After Visit Summary   1/22/2018    Oumou Carolina    MRN: [id number]           Patient Information     Date Of Birth          1965        Visit Information        Provider Department      1/22/2018 2:30 PM Eda Bush MD [facility]        Today's Diagnoses     Encounter for gynecological examination without abnormal finding    -  1    Special screening for malignant neoplasms, colon          Care Instructions      Preventive Health Recommendations  Female Ages 50 - 64    Yearly exam: See your health care provider every year in order to  o Review health changes.   o Discuss preventive care.    o Review your medicines if your doctor has prescribed any.      Get a Pap test every three years (unless you have an abnormal result and your provider advises testing more often).    If you get Pap tests with HPV test, you only need to test every 5 years, unless you have an abnormal result.     You do not need a Pap test if your uterus was removed (hysterectomy) and you have not had cancer.    You should be tested each year for STDs (sexually transmitted diseases) if you're at risk.     Have a mammogram every 1 to 2 years.    Have a colonoscopy at age 50, or have a yearly FIT test (stool test). These exams screen for colon cancer.      Have a cholesterol test every 5 years, or more often if advised.    Have a diabetes test (fasting glucose) every three years. If you are at risk for diabetes, you should have this test more often.     If you are at risk for osteoporosis (brittle bone disease), think about having a bone density scan (DEXA).    Shots: Get a flu shot each year. Get a tetanus shot every 10 years.    Nutrition:     Eat at least 5 servings of fruits and vegetables each day.    Eat whole-grain bread, whole-wheat pasta and brown rice instead of white grains and rice.    Talk to your provider about Calcium and Vitamin D.     Lifestyle    Exercise at least 150 minutes a  "week (30 minutes a day, 5 days a week). This will help you control your weight and prevent disease.    Limit alcohol to one drink per day.    No smoking.     Wear sunscreen to prevent skin cancer.     See your dentist every six months for an exam and cleaning.    See your eye doctor every 1 to 2 years.            Follow-ups after your visit        Your next 10 appointments already scheduled     Jan 22, 2018  3:30 PM CST   (Arrive by 3:15 PM)   MA SCREENING DIGITAL BILATERAL with [facility]   [facility] ([facility])    [address] [place]  [facility] [address]   [phone]           Do not use any powder, lotion or deodorant under your arms or on your breast. If you do, we will ask you to remove it before your exam.  Wear comfortable, two-piece clothing.  If you have any allergies, tell your care team.  Bring any previous mammograms from other facilities or have them mailed to the breast center. Three-dimensional (3D) mammograms are available at [facility] locations in [facility], [facility], and [place]. [facility] locations include [place] and [facility] in [place]. Benefits of 3D mammograms include: - Improved rate of cancer detection - Decreases your chance of having to go back for more tests, which means fewer: - \"False-positive\" results (This means that there is an abnormal area but it isn't cancer.) - Invasive testing procedures, such as a biopsy or surgery - Can provide clearer images of the breast if you have dense breast tissue. 3D mammography is an optional exam that anyone can have with a 2D mammogram. It doesn't replace or take the place of a 2D mammogram. 2D mammograms remain an effective screening test for all women.  Not all insurance companies cover the cost of a 3D mammogram. Check with your insurance.            Aug 10, 2018  8:45 AM CDT   Return Visit with Sonal Peters MD   [facility] " "([facility])    [address] [place] [id number] [phone]              Future tests that were ordered for you today     Open Future Orders        Priority Expected Expires Ordered    Fecal colorectal cancer screen (FIT) Routine 2/12/2018 4/16/2018 1/22/2018            Who to contact     If you have questions or need follow up information about today's clinic visit or your schedule please contact [facility] directly at [phone].  Normal or non-critical lab and imaging results will be communicated to you by WiDaPeoplehart, letter or phone within 4 business days after the clinic has received the results. If you do not hear from us within 7 days, please contact the clinic through [facility] or phone. If you have a critical or abnormal lab result, we will notify you by phone as soon as possible.  Submit refill requests through JAZIO or call your pharmacy and they will forward the refill request to us. Please allow 3 business days for your refill to be completed.          Additional Information About Your Visit        WiDaPeoplehart Information     JAZIO gives you secure access to your electronic health record. If you see a primary care provider, you can also send messages to your care team and make appointments. If you have questions, please call your primary care clinic.  If you do not have a primary care provider, please call [phone] and they will assist you.        Care EveryWhere ID     This is your Care EveryWhere ID. This could be used by other organizations to access your [facility] medical records  [id number]        Your Vitals Were     Pulse Temperature Height BMI (Body Mass Index)          98 97.1  F (36.2  C) (Tympanic) 5' 6\" (1.676 m) 28.25 kg/m2         Blood Pressure from Last 3 Encounters:   01/22/18 146/77   09/28/17 (!) 132/92   11/28/16 126/65    Weight from Last 3 Encounters:   01/22/18 175 lb (79.4 kg)   09/28/17 173 lb (78.5 kg)   11/28/16 167 lb (75.8 " kg)               Primary Care Provider Office Phone # Fax #    [facility] [phone] [address] [facility] [address]        Equal Access to Services     LANG CHACKO : Hadii aad ku hadkristophero Soomaali, waaxda luqadaha, qaybta kaalmada adeegyada, mayank gonzalezn marcellusabdi dove lan almanza. So [facility] [phone].    ATENCIÓN: Si habla español, tiene a avendano disposición servicios gratuitos de asistencia lingüística. Llame al [phone].    We comply with applicable federal civil rights laws and [place] laws. We do not discriminate on the basis of race, color, national origin, age, disability, sex, sexual orientation, or gender identity.            Thank you!     Thank you for choosing [facility]  for your care. Our goal is always to provide you with excellent care. Hearing back from our patients is one way we can continue to improve our services. Please take a few minutes to complete the written survey that you may receive in the mail after your visit with us. Thank you!             Your Updated Medication List - Protect others around you: Learn how to safely use, store and throw away your medicines at www.disposemymeds.org.      Notice  As of 1/22/2018  3:06 PM    You have not been prescribed any medications.

## 2018-04-11 ENCOUNTER — TELEPHONE (OUTPATIENT)
Dept: FAMILY MEDICINE | Facility: CLINIC | Age: 53
End: 2018-04-11

## 2018-04-11 NOTE — TELEPHONE ENCOUNTER
Summary:    Patient is due/failing the following:   FIT    Action needed:   Complete a FIT test     Type of outreach:    Phone, spoke to patient.  patient will complete and return FIT test     Questions for provider review:    None                                                                                                                                    Yesika Baig       Chart routed to Care Team .      Panel Management Review      Patient has the following on her problem list:     Hypertension   Last three blood pressure readings:  BP Readings from Last 3 Encounters:   01/22/18 146/77   09/28/17 (!) 132/92   11/28/16 126/65     Blood pressure: FAILED    HTN Guidelines:  Age 18-59 BP range:  Less than 140/90  Age 60-85 with Diabetes:  Less than 140/90  Age 60-85 without Diabetes:  less than 150/90      Composite cancer screening  Chart review shows that this patient is due/due soon for the following Fecal Colorectal (FIT)

## 2018-05-11 ENCOUNTER — TRANSFERRED RECORDS (OUTPATIENT)
Dept: HEALTH INFORMATION MANAGEMENT | Facility: CLINIC | Age: 53
End: 2018-05-11

## 2018-08-10 ENCOUNTER — OFFICE VISIT (OUTPATIENT)
Dept: DERMATOLOGY | Facility: CLINIC | Age: 53
End: 2018-08-10
Payer: COMMERCIAL

## 2018-08-10 DIAGNOSIS — L57.0 ACTINIC KERATOSIS: ICD-10-CM

## 2018-08-10 DIAGNOSIS — L81.4 SOLAR LENTIGINOSIS: ICD-10-CM

## 2018-08-10 DIAGNOSIS — L21.9 DERMATITIS, SEBORRHEIC: ICD-10-CM

## 2018-08-10 DIAGNOSIS — D48.5 NEOPLASM OF UNCERTAIN BEHAVIOR OF SKIN: Primary | ICD-10-CM

## 2018-08-10 PROCEDURE — 11101 HC BIOPSY SKIN/SUBQ/MUC MEM, EACH ADDTL LESION: CPT | Performed by: DERMATOLOGY

## 2018-08-10 PROCEDURE — 11100 HC BIOPSY SKIN/SUBQ/MUC MEM, SINGLE LESION: CPT | Mod: 59 | Performed by: DERMATOLOGY

## 2018-08-10 PROCEDURE — 88305 TISSUE EXAM BY PATHOLOGIST: CPT | Mod: TC | Performed by: DERMATOLOGY

## 2018-08-10 PROCEDURE — 17000 DESTRUCT PREMALG LESION: CPT | Performed by: DERMATOLOGY

## 2018-08-10 PROCEDURE — 99213 OFFICE O/P EST LOW 20 MIN: CPT | Mod: 25 | Performed by: DERMATOLOGY

## 2018-08-10 PROCEDURE — 88342 IMHCHEM/IMCYTCHM 1ST ANTB: CPT | Mod: TC | Performed by: DERMATOLOGY

## 2018-08-10 RX ORDER — FLUOCINONIDE TOPICAL SOLUTION USP, 0.05% 0.5 MG/ML
SOLUTION TOPICAL
Qty: 60 ML | Refills: 1 | Status: SHIPPED | OUTPATIENT
Start: 2018-08-10 | End: 2019-02-04

## 2018-08-10 NOTE — PROGRESS NOTES
[facility] Dermatology Note      Dermatology Problem List:  1.Family history of melanoma (mother)  2. NMSC  -BCC, central abdomen, s/p excision 11/25/2015  -BCC, nodular, left frontal scalp, s/p Mohs 2/10/15  -SCCIS, left chest, s/p excision 2/10/15  -BCC, nodular with fibrosis, right chest, s/p excision 11/4/14   3. Actinic Keratosis  -Previous Tx: Efudex 3/2015, cryotherapy  4. Seborrheic dermatitis  -lidex 8/2018    Encounter Date: Aug 10, 2018    CC:  Chief Complaint   Patient presents with     Skin Check     Spots of concern: left cheek, hairline, and spot on mid chest.          History of Present Illness:  Ms. Oumou Carolina is a 53 year old female who presents as a follow-up for history of NMSC. She was last seen 8/4/17 when a lesion on the right posterior upper arm was biopsied ad classified as a prurigo nodule. Today the patient reports a few concerns. She first reports and area on the cheek which she noticed 5 or 6 months ago. It appears as though it might be growing, albeit at a very slow pace. Next, the patient reports another lesion of concern along her left sideburn which she calls rough and bumpy. She says that her scalp as been somewhat red recently as well. Finally, there is a lesion on the chest which has been present for the last few months as well.     No other skin concerns to be specifically addressed.         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 2-10-15     Basal cell carcinoma of left frontal scalp (hairline) s/p mohs 2-10-15     Elevated blood pressure reading without diagnosis of hypertension     Past Medical History:   Diagnosis Date     Allergy, unspecified not  elsewhere classified 2003    s/p desensitization     Anxiety      Atrial tachycardia (H)     ablation 3/24/11     Bartholin's abscess 7/09     Basal cell carcinoma of anterior chest s/p excision 11-4-14 11/4/2014     Bradycardia      Excessive or frequent menstruation 2001    s/p TVH     Transient disorder of initiating or maintaining sleep 2005     Past Surgical History:   Procedure Laterality Date     ABDOMEN SURGERY  2001    Hysterectomy     ARTHROPLASTY TOE(S)  12/2/2013    Procedure: ARTHROPLASTY TOE(S);;  Surgeon: Vinayak Palacios DPM;  Location: [facility] OR     BIOPSY  2014, 2016, 2017    skin cancer biopsies, multiple     BUNIONECTOMY  12/2/2013    Procedure: BUNIONECTOMY;  Bunionectomy, resection of arthroplasty of PIP joint right foot;  Surgeon: Vinayak Palacios DPM;  Location: [facility] OR     C LIGATE FALLOPIAN TUBE       CARDIAC SURGERY  ablation for abnormal rhythym    2013     COSMETIC SURGERY  abdomiplasty, lipo    6/2016, 4/2017     EYE SURGERY  lasik    11/2007     HC REVISE MEDIAN N/CARPAL TUNNEL SURG      bilateral carpal tunnel repair     HYSTERECTOMY, PAP NO LONGER INDICATED  2001     HYSTERECTOMY, VAGINAL  1/30/01    TVH for menorraghia     REPAIR HAMMER TOE  12/19/2011    Procedure:REPAIR HAMMER TOE; Arthroplasty PIPJ 5th toe, left; Surgeon:VINAYAK PALACIOS; Location:[facility] OR     SURGICAL HISTORY OF -   8/09    Bilat LE Laser ablation, varicose veins     SURGICAL HISTORY OF -   3/24/11    atrial ablation     Social History:  The patient works as a .      Family History:  There is a family history of skin cancer in the patient's mother.  There is a family history of melanoma in the patient's mother.    Medications:  No current outpatient prescriptions on file.     Allergies   Allergen Reactions     No Known Drug Allergies      Review of Systems:  -Skin: As above in HPI. No additional skin concerns.  -Const: The patient is generally feeling well today.     Physical exam:  GEN: This is a well  developed, well-nourished female in no acute distress, in a pleasant mood.    SKIN: Total skin excluding the undergarment areas was performed. The exam included the head/face, neck, both arms, chest, back, abdomen, both legs, digits and/or nails, including exam of the external buttocks. Nails are painted.  - 5 mm pearly papule on left angle of the mandible.   - Macular erythema of the scalp, crown, and along the hairline  - 2mm papule on the central chest that is erythematous. R/o BCC  - Two excoriations on the left occipital scalp  - There is an erythematous macule with overyling adherent scale on the right infraorbital.  - Scattered brown macules on sun exposed areas.  - 5 mm shiny macule on the left chest  -No other lesions of concern on areas examined.     Impression/Plan:  1. Family history of melanoma    Last total skin exam 8/10/18    2. History of nonmelanoma skin cancer- No evidence of recurrence    Recommend sunscreens SPF #30 or greater, protective clothing and avoidance of tanning beds.    3. Actinic keratosis, right infraorbital    Cryotherapy procedure note: After verbal consent and discussion of risks and benefits including but no limited to dyspigmentation/scar, blister, and pain, 1 were treated with 1-2mm freeze border for 1 cycle with liquid nitrogen. Post cryotherapy instructions were provided.    Re-check the site at patient's next visit.     4. Solar lentigines,     No further intervention required at this time.    5.  2 mm papule on the central chest that is erythematous. Neoplasm of uncertain behavior. R/o BCC    Shave biopsy:  After discussion of benefits and risks including but not limited to bleeding/bruising, pain/swelling, infection, scar, incomplete removal, nerve damage/numbness, recurrence, and non-diagnostic biopsy, written consent, verbal consent and photographs were obtained. Time-out was performed. The area was cleaned with isopropyl alcohol. 0.5mL of 1% lidocaine with epinephrine  was injected to obtain adequate anesthesia of the lesion on the central chest. A shave biopsy was performed. Hemostasis was achieved with aluminium chloride. Vaseline and a sterile dressing were applied. The patient tolerated the procedure and no complications were noted. The patient was provided with verbal and written post care instructions.     6. 5 mm pearly papule on left angle of the mandible. Neoplasm of uncertain behavior. Differential includes BCC vs other    After discussion of benefits and risks including but not limited to bleeding, infection, scar, incomplete removal, recurrence, and non-diagnostic biopsy, written consent and photographs were obtained. The area was cleaned with isopropyl alcohol. 0.5 mL of 1% lidocaine with epinephrine was injected to obtain adequate anesthesia of the lesion on the left angle of the mandible. A shave biopsy was performed. Hemostasis was achieved with aluminium chloride. Vaseline and a sterile dressing were applied. The patient tolerated the procedure and no complications were noted. The patient was provided with verbal and written post care instructions.     7. 7 mm shiny depressed macule on the left chest. Neoplasm of uncertain behavior. Differential includes BCC vs scar vs BLK.    After discussion of benefits and risks including but not limited to bleeding, infection, scar, incomplete removal, recurrence, and non-diagnostic biopsy, written consent and photographs were obtained. The area was cleaned with isopropyl alcohol. 0.5 mL of 1% lidocaine with epinephrine was injected to obtain adequate anesthesia of the lesion on the left chest. A shave biopsy was performed. Hemostasis was achieved with aluminium chloride. Vaseline and a sterile dressing were applied. The patient tolerated the procedure and no complications were noted. The patient was provided with verbal and written post care instructions.   8. Seb derm    Reviewed nature, started lidex for up to  1 weeks in a  mike, reviewed risk of steroid atrophy with over use  Follow up 6 months, earlier pending biopsy results     Staff Involved:  Scribe/Staff    Scribe Disclosure  I, Malachi Oscar, am serving as a scribe to document services personally performed by Dr. Sonal Peters MD, based on data collection and the provider's statements to me.     Provider Disclosure:   The documentation recorded by the scribe accurately reflects the services I personally performed and the decisions made by me.    Sonal Peters MD    Department of Dermatology  [facility]: Phone: [phone], Fax:[phone]  [facility]: Phone: [phone], Fax: [phone]

## 2018-08-10 NOTE — PATIENT INSTRUCTIONS
Wound Care After Skin Procedure       What are the risks of a skin procedure?  I will experience scar, bleeding, swelling, pain, crusting and redness. I may experience incomplete removal or recurrence. Risks of this procedure are excessive bleeding, bruising, infection, nerve damage, numbness, thick (hypertrophic or keloidal) scar and non-diagnostic biopsy.    How should I care for my wound for the first 24 hours?    Keep the wound dry and covered for 24 hours    If it bleeds, hold direct pressure on the area for 15 minutes. If bleeding does not stop then go to the emergency room    Avoid strenuous exercise the first 1-2 days or as your doctor instructs you    How should I care for the wound after 24 hours?    After 24 hours, remove the bandage    You may bathe or shower as normal    If you had a scalp biopsy, you can shampoo as usual and can use shower water to clean the biopsy site daily    Clean the wound twice a day with gentle soap and water    Do not scrub, be gentle    Apply white petroleum/Vaseline after cleaning the wound with a cotton swab or a clean finger. You may keep the site covered with a Bandaid /bandage if you prefer    Avoid strenuous activity for first 1-2 days    Avoid lakes, rivers, pools, and oceans until the stitches are removed or the site is healed    How do I clean my wound?    Wash hands thoroughly with soap or use hand  before all wound care    Clean the wound with gentle soap and water    Apply white petroleum/Vaseline  to wound after it is clean    Replace the Bandaid /bandage to keep the wound covered for the first few days or as instructed by your doctor    If you had a scalp biopsy, warm shower water to the area on a daily basis should suffice    What should I use to clean my wound?     Cotton-tipped applicators (Qtips )    White petroleum jelly (Vaseline ). Use a clean new container and use Q-tips to apply.    Bandaids   as needed    Gentle soap     How should I care for  my wound long term?    Do not get your wound dirty    Keep up with wound care for one week or until the area is healed.    A small scab will form and fall off by itself when the area is completely healed. The area will be red and will become pink in color as it heals. Sun protection is very important for how your scar will turn out. Sunscreen with an SPF 30 or greater is recommended once the area is healed.    You should have some soreness but it should be mild and slowly go away over several days. Talk to your doctor about using tylenol for pain,    When should I call my doctor?  If you have increased:     Pain or swelling    Pus or drainage (clear or slightly yellow drainage is ok)    Temperature over 100F    Spreading redness or warmth around wound    When will I hear about my results?  The biopsy results can take 2-3 weeks to come back. The clinic will call you with the results, send you a [employer] message, or have you schedule a follow-up clinic or phone time to discuss the results. Contact our clinics if you do not hear from us in 3 weeks.     Who should I call with questions?    [facility]: [phone]     [facility]: [phone]    For urgent needs outside of business hours call the [facility] at [phone] and ask for the dermatology resident on call      Dandruff Shampoos    What do I use dandruff shampoos for?    Flaking    Redness    Itching    How and when do I use these shampoos?    Rub gently into scalp.    Leave on for at least five minutes before rinsing.    You will feel better with daily use.    As redness, flaking, and itching get better you can use the shampoo less    Using two shampoos with different active elements may work best. Switch shampoos each week.    What shampoos can I buy?  Below is a list of active elements that help with itching, redness, and flaking. Name brand shampoos having these elements are included.   Non-brand shampoos that have these active elements can also be used.     Selenium Sulfide  o Blue Head and Shoulders (1% selenium sulfide)  o Selsun Blue (1% selenium sulfide)  o Selsun Gold (2.5% selenium sulfide, prescription needed)    Salicylic Acid  o Neutrogena T/Sal  (3% salicylic acid)  o Sebulex  (2% sulfur, 2% salicylic acid)  o Ionil  (2% salicylic acid)    Pyrithione Zinc  o White Head and Shoulders (1% pyrithione zinc)  o DHS Zinc Shampoo (1% pyrithione zinc)    Ketoconazole  o Nizoral  (1% ketoconazole)    Tar (Note: tar may turn white/gray hair yellow if used often)  o Neutrogena T/Gel (0.5% coal tar)  o Neutrogena T/Gel extra strength (1% coal tar)  o DHS Tar Shampoo (0.5% coal tar)    Cryotherapy    What is it?    Use of a very cold liquid, such as liquid nitrogen, to freeze and destroy abnormal skin cells that need to be removed    What should I expect?    Tenderness and redness    A small blister that might grow and fill with dark purple blood. There may be crusting.    More than one treatment may be needed if the lesions do not go away.    How do I care for the treated area?    Gently wash the area with your hands when bathing.    Use a thin layer of Vaseline to help with healing. You may use a Band-Aid.     The area should heal within 7-10 days and may leave behind a pink or lighter color.     Do not use an antibiotic or Neosporin ointment.     You may take acetaminophen (Tylenol) for pain.     Call your Doctor if you have:    Severe pain    Signs of infection (warmth, redness, cloudy yellow drainage, and or a bad smell)    Questions or concerns    Who should I call with questions?       [facility]: [phone]       [facility]: [phone]       For urgent needs outside of business hours call the [facility] at [phone]        and ask for the dermatology resident on call

## 2018-08-10 NOTE — LETTER
8/10/2018         RE: Oumou Carolina  [address] [facility] [address]        Dear Colleague,    Thank you for referring your patient, Oumou Carolina, to the [facility]. Please see a copy of my visit note below.    [facility] Dermatology Note      Dermatology Problem List:  1.Family history of melanoma (mother)  2. NMSC  -BCC, central abdomen, s/p excision 11/25/2015  -BCC, nodular, left frontal scalp, s/p Mohs 2/10/15  -SCCIS, left chest, s/p excision 2/10/15  -BCC, nodular with fibrosis, right chest, s/p excision 11/4/14   3. Actinic Keratosis  -Previous Tx: Efudex 3/2015, cryotherapy  4. Seborrheic dermatitis  -lidex 8/2018    Encounter Date: Aug 10, 2018    CC:  Chief Complaint   Patient presents with     Skin Check     Spots of concern: left cheek, hairline, and spot on mid chest.          History of Present Illness:  Ms. Oumou Carolina is a 53 year old female who presents as a follow-up for history of NMSC. She was last seen 8/4/17 when a lesion on the right posterior upper arm was biopsied ad classified as a prurigo nodule. Today the patient reports a few concerns. She first reports and area on the cheek which she noticed 5 or 6 months ago. It appears as though it might be growing, albeit at a very slow pace. Next, the patient reports another lesion of concern along her left sideburn which she calls rough and bumpy. She says that her scalp as been somewhat red recently as well. Finally, there is a lesion on the chest which has been present for the last few months as well.     No other skin concerns to be specifically addressed.         Past Medical History:   Patient Active Problem List   Diagnosis     Persistent disorder of initiating or maintaining sleep     Hyperlipidemia LDL goal <160     Sinus bradycardia     Hammer toe     Hx of hysterectomy     Basal cell carcinoma of anterior chest s/p excision 11-4-14     Squamous cell carcinoma in situ left  chest s/p excision 2-10-15     Basal cell carcinoma of left frontal scalp (hairline) s/p mohs 2-10-15     Elevated blood pressure reading without diagnosis of hypertension     Past Medical History:   Diagnosis Date     Allergy, unspecified not elsewhere classified 2003    s/p desensitization     Anxiety      Atrial tachycardia (H)     ablation 3/24/11     Bartholin's abscess 7/09     Basal cell carcinoma of anterior chest s/p excision 11-4-14 11/4/2014     Bradycardia      Excessive or frequent menstruation 2001    s/p TVH     Transient disorder of initiating or maintaining sleep 2005     Past Surgical History:   Procedure Laterality Date     ABDOMEN SURGERY  2001    Hysterectomy     ARTHROPLASTY TOE(S)  12/2/2013    Procedure: ARTHROPLASTY TOE(S);;  Surgeon: Vinayak Palacios DPM;  Location: [facility] OR     BIOPSY  2014, 2016, 2017    skin cancer biopsies, multiple     BUNIONECTOMY  12/2/2013    Procedure: BUNIONECTOMY;  Bunionectomy, resection of arthroplasty of PIP joint right foot;  Surgeon: Vinayak Palacios DPM;  Location: [facility] OR     C LIGATE FALLOPIAN TUBE       CARDIAC SURGERY  ablation for abnormal rhythym    2013     COSMETIC SURGERY  abdomiplasty, lipo    6/2016, 4/2017     EYE SURGERY  lasik    11/2007     HC REVISE MEDIAN N/CARPAL TUNNEL SURG      bilateral carpal tunnel repair     HYSTERECTOMY, PAP NO LONGER INDICATED  2001     HYSTERECTOMY, VAGINAL  1/30/01    TVH for menorraghia     REPAIR HAMMER TOE  12/19/2011    Procedure:REPAIR HAMMER TOE; Arthroplasty PIPJ 5th toe, left; Surgeon:VINAYAK PALACIOS; Location:[facility] OR     SURGICAL HISTORY OF -   8/09    Bilat LE Laser ablation, varicose veins     SURGICAL HISTORY OF -   3/24/11    atrial ablation     Social History:  The patient works as a .      Family History:  There is a family history of skin cancer in the patient's mother.  There is a family history of melanoma in the patient's mother.    Medications:  No current outpatient prescriptions  on file.     Allergies   Allergen Reactions     No Known Drug Allergies      Review of Systems:  -Skin: As above in HPI. No additional skin concerns.  -Const: The patient is generally feeling well today.     Physical exam:  GEN: This is a well developed, well-nourished female in no acute distress, in a pleasant mood.    SKIN: Total skin excluding the undergarment areas was performed. The exam included the head/face, neck, both arms, chest, back, abdomen, both legs, digits and/or nails, including exam of the external buttocks. Nails are painted.  - 5 mm pearly papule on left angle of the mandible.   - Macular erythema of the scalp, crown, and along the hairline  - 2mm papule on the central chest that is erythematous. R/o BCC  - Two excoriations on the left occipital scalp  - There is an erythematous macule with overyling adherent scale on the right infraorbital.  - Scattered brown macules on sun exposed areas.  - 5 mm shiny macule on the left chest  -No other lesions of concern on areas examined.     Impression/Plan:  1. Family history of melanoma    Last total skin exam 8/10/18    2. History of nonmelanoma skin cancer- No evidence of recurrence    Recommend sunscreens SPF #30 or greater, protective clothing and avoidance of tanning beds.    3. Actinic keratosis, right infraorbital    Cryotherapy procedure note: After verbal consent and discussion of risks and benefits including but no limited to dyspigmentation/scar, blister, and pain, 1 were treated with 1-2mm freeze border for 1 cycle with liquid nitrogen. Post cryotherapy instructions were provided.    Re-check the site at patient's next visit.     4. Solar lentigines,     No further intervention required at this time.    5.  2 mm papule on the central chest that is erythematous. Neoplasm of uncertain behavior. R/o BCC    Shave biopsy:  After discussion of benefits and risks including but not limited to bleeding/bruising, pain/swelling, infection, scar,  incomplete removal, nerve damage/numbness, recurrence, and non-diagnostic biopsy, written consent, verbal consent and photographs were obtained. Time-out was performed. The area was cleaned with isopropyl alcohol. 0.5mL of 1% lidocaine with epinephrine was injected to obtain adequate anesthesia of the lesion on the central chest. A shave biopsy was performed. Hemostasis was achieved with aluminium chloride. Vaseline and a sterile dressing were applied. The patient tolerated the procedure and no complications were noted. The patient was provided with verbal and written post care instructions.     6. 5 mm pearly papule on left angle of the mandible. Neoplasm of uncertain behavior. Differential includes BCC vs other    After discussion of benefits and risks including but not limited to bleeding, infection, scar, incomplete removal, recurrence, and non-diagnostic biopsy, written consent and photographs were obtained. The area was cleaned with isopropyl alcohol. 0.5 mL of 1% lidocaine with epinephrine was injected to obtain adequate anesthesia of the lesion on the left angle of the mandible. A shave biopsy was performed. Hemostasis was achieved with aluminium chloride. Vaseline and a sterile dressing were applied. The patient tolerated the procedure and no complications were noted. The patient was provided with verbal and written post care instructions.     7. 7 mm shiny depressed macule on the left chest. Neoplasm of uncertain behavior. Differential includes BCC vs scar vs BLK.    After discussion of benefits and risks including but not limited to bleeding, infection, scar, incomplete removal, recurrence, and non-diagnostic biopsy, written consent and photographs were obtained. The area was cleaned with isopropyl alcohol. 0.5 mL of 1% lidocaine with epinephrine was injected to obtain adequate anesthesia of the lesion on the left chest. A shave biopsy was performed. Hemostasis was achieved with aluminium chloride.  Vaseline and a sterile dressing were applied. The patient tolerated the procedure and no complications were noted. The patient was provided with verbal and written post care instructions.   8. Seb derm    Reviewed nature, started lidex for up to  1 weeks in a row, reviewed risk of steroid atrophy with over use  Follow up 6 months, earlier pending biopsy results     Staff Involved:  Scribe/Staff    Scribe Disclosure  I, Malachi Oscar, am serving as a scribe to document services personally performed by Dr. Sonal Peters MD, based on data collection and the provider's statements to me.     Provider Disclosure:   The documentation recorded by the scribe accurately reflects the services I personally performed and the decisions made by me.    Sonal Peters MD    Department of Dermatology  [facility]: Phone: [phone], Fax:[phone]  [facility]: Phone: [phone], Fax: [phone]        Again, thank you for allowing me to participate in the care of your patient.        Sincerely,        Sonal Peters MD

## 2018-08-10 NOTE — MR AVS SNAPSHOT
After Visit Summary   8/10/2018    Oumou Carolina    MRN: [id number]           Patient Information     Date Of Birth          1965        Visit Information        Provider Department      8/10/2018 8:45 AM Sonal Peters MD [facility]        Today's Diagnoses     Neoplasm of uncertain behavior of skin    -  1    Dermatitis, seborrheic        Solar lentiginosis        Actinic keratosis          Care Instructions    Wound Care After Skin Procedure       What are the risks of a skin procedure?  I will experience scar, bleeding, swelling, pain, crusting and redness. I may experience incomplete removal or recurrence. Risks of this procedure are excessive bleeding, bruising, infection, nerve damage, numbness, thick (hypertrophic or keloidal) scar and non-diagnostic biopsy.    How should I care for my wound for the first 24 hours?    Keep the wound dry and covered for 24 hours    If it bleeds, hold direct pressure on the area for 15 minutes. If bleeding does not stop then go to the emergency room    Avoid strenuous exercise the first 1-2 days or as your doctor instructs you    How should I care for the wound after 24 hours?    After 24 hours, remove the bandage    You may bathe or shower as normal    If you had a scalp biopsy, you can shampoo as usual and can use shower water to clean the biopsy site daily    Clean the wound twice a day with gentle soap and water    Do not scrub, be gentle    Apply white petroleum/Vaseline after cleaning the wound with a cotton swab or a clean finger. You may keep the site covered with a Bandaid /bandage if you prefer    Avoid strenuous activity for first 1-2 days    Avoid lakes, rivers, pools, and oceans until the stitches are removed or the site is healed    How do I clean my wound?    Wash hands thoroughly with soap or use hand  before all wound care    Clean the wound with gentle soap and water    Apply white petroleum/Vaseline  to wound  after it is clean    Replace the Bandaid /bandage to keep the wound covered for the first few days or as instructed by your doctor    If you had a scalp biopsy, warm shower water to the area on a daily basis should suffice    What should I use to clean my wound?     Cotton-tipped applicators (Qtips )    White petroleum jelly (Vaseline ). Use a clean new container and use Q-tips to apply.    Bandaids   as needed    Gentle soap     How should I care for my wound long term?    Do not get your wound dirty    Keep up with wound care for one week or until the area is healed.    A small scab will form and fall off by itself when the area is completely healed. The area will be red and will become pink in color as it heals. Sun protection is very important for how your scar will turn out. Sunscreen with an SPF 30 or greater is recommended once the area is healed.    You should have some soreness but it should be mild and slowly go away over several days. Talk to your doctor about using tylenol for pain,    When should I call my doctor?  If you have increased:     Pain or swelling    Pus or drainage (clear or slightly yellow drainage is ok)    Temperature over 100F    Spreading redness or warmth around wound    When will I hear about my results?  The biopsy results can take 2-3 weeks to come back. The clinic will call you with the results, send you a "[employer]t message, or have you schedule a follow-up clinic or phone time to discuss the results. Contact our clinics if you do not hear from us in 3 weeks.     Who should I call with questions?    [facility]: [phone]     [facility]: [phone]    For urgent needs outside of business hours call the [facility] at [phone] and ask for the dermatology resident on call      Dandruff Shampoos    What do I use dandruff shampoos for?    Flaking    Redness    Itching    How and when do I use these  shampoos?    Rub gently into scalp.    Leave on for at least five minutes before rinsing.    You will feel better with daily use.    As redness, flaking, and itching get better you can use the shampoo less    Using two shampoos with different active elements may work best. Switch shampoos each week.    What shampoos can I buy?  Below is a list of active elements that help with itching, redness, and flaking. Name brand shampoos having these elements are included.  Non-brand shampoos that have these active elements can also be used.     Selenium Sulfide  o Blue Head and Shoulders (1% selenium sulfide)  o Selsun Blue (1% selenium sulfide)  o Selsun Gold (2.5% selenium sulfide, prescription needed)    Salicylic Acid  o Neutrogena T/Sal  (3% salicylic acid)  o Sebulex  (2% sulfur, 2% salicylic acid)  o Ionil  (2% salicylic acid)    Pyrithione Zinc  o White Head and Shoulders (1% pyrithione zinc)  o DHS Zinc Shampoo (1% pyrithione zinc)    Ketoconazole  o Nizoral  (1% ketoconazole)    Tar (Note: tar may turn white/gray hair yellow if used often)  o Neutrogena T/Gel (0.5% coal tar)  o Neutrogena T/Gel extra strength (1% coal tar)  o DHS Tar Shampoo (0.5% coal tar)    Cryotherapy    What is it?    Use of a very cold liquid, such as liquid nitrogen, to freeze and destroy abnormal skin cells that need to be removed    What should I expect?    Tenderness and redness    A small blister that might grow and fill with dark purple blood. There may be crusting.    More than one treatment may be needed if the lesions do not go away.    How do I care for the treated area?    Gently wash the area with your hands when bathing.    Use a thin layer of Vaseline to help with healing. You may use a Band-Aid.     The area should heal within 7-10 days and may leave behind a pink or lighter color.     Do not use an antibiotic or Neosporin ointment.     You may take acetaminophen (Tylenol) for pain.     Call your Doctor if you have:    Severe  pain    Signs of infection (warmth, redness, cloudy yellow drainage, and or a bad smell)    Questions or concerns    Who should I call with questions?       [facility]: [phone]       [facility]: [phone]       For urgent needs outside of business hours call the [facility] at [phone]        and ask for the dermatology resident on call          Follow-ups after your visit        Follow-up notes from your care team     Return in about 6 months (around 2/10/2019) for Skin Check - Hx of non melanoma skin cancer.      Your next 10 appointments already scheduled     Feb 08, 2019  8:15 AM CST   Return Visit with Sonal Peters MD   [facility]    [address] [place] [id number] [phone]              Who to contact     If you have questions or need follow up information about today's clinic visit or your schedule please contact [facility] directly at [phone].  Normal or non-critical lab and imaging results will be communicated to you by Coverityhart, letter or phone within 4 business days after the clinic has received the results. If you do not hear from us within 7 days, please contact the clinic through Coverityhart or phone. If you have a critical or abnormal lab result, we will notify you by phone as soon as possible.  Submit refill requests through [employer] or call your pharmacy and they will forward the refill request to us. Please allow 3 business days for your refill to be completed.          Additional Information About Your Visit        Coverityhart Information     [employer] gives you secure access to your electronic health record. If you see a primary care provider, you can also send messages to your care team and make appointments. If you have questions, please call your primary care clinic.  If you do not have a primary care provider, please call  [phone] and they will assist you.      [employer]is is an electronic gateway that provides easy, online access to your medical records. With Telogis, you can request a clinic appointment, read your test results, renew a prescription or communicate with your care team.     To access your existing account, please contact your [facility] or call [phone] for assistance.        Care EveryWhere ID     This is your Care EveryWhere ID. This could be used by other organizations to access your [facility] medical records  [id number]         Blood Pressure from Last 3 Encounters:   01/22/18 146/77   09/28/17 (!) 132/92   11/28/16 126/65    Weight from Last 3 Encounters:   01/22/18 79.4 kg (175 lb)   09/28/17 78.5 kg (173 lb)   11/28/16 75.8 kg (167 lb)              We Performed the Following     BIOPSY SKIN/SUBQ/MUC MEM, EACH ADDTL LESION     BIOPSY SKIN/SUBQ/MUC MEM, SINGLE LESION     Dermatological path order and indications     DESTRUCT PREMALIGNANT LESION, FIRST          Today's Medication Changes          These changes are accurate as of 8/10/18  9:22 AM.  If you have any questions, ask your nurse or doctor.               Start taking these medicines.        Dose/Directions    fluocinonide 0.05 % solution   Commonly known as:  LIDEX   Used for:  Dermatitis, seborrheic   Started by:  Sonal Peters MD        Apply daily for up to 1 week for flares   Quantity:  60 mL   Refills:  1            Where to get your medicines      These medications were sent to [id number] - [place] - [address]  [address], [place] [address]    Hours:  test fax sent successfully 1/20/04   Phone:  [phone]     fluocinonide 0.05 % solution                Primary Care Provider Office Phone # Fax #    TORIBIO Rowe [phone] [address] [place] [address]        Equal Access to Services     LANG CHACKO AH: Hadii  curtis Corado, waedson greenadaha, qasusanata kaflorina marcellusshreyas, waxmarc gabrielle teresalaurenfermín [place] cami. So [facility] [phone].    ATENCIÓN: Si habla español, tiene a avendano disposición servicios gratuitos de asistencia lingüística. Llame al [phone].    We comply with applicable federal civil rights laws and [place] laws. We do not discriminate on the basis of race, color, national origin, age, disability, sex, sexual orientation, or gender identity.            Thank you!     Thank you for choosing [facility]  for your care. Our goal is always to provide you with excellent care. Hearing back from our patients is one way we can continue to improve our services. Please take a few minutes to complete the written survey that you may receive in the mail after your visit with us. Thank you!             Your Updated Medication List - Protect others around you: Learn how to safely use, store and throw away your medicines at www.disposemymeds.org.          This list is accurate as of 8/10/18  9:22 AM.  Always use your most recent med list.                   Brand Name Dispense Instructions for use Diagnosis    fluocinonide 0.05 % solution    LIDEX    60 mL    Apply daily for up to 1 week for flares    Dermatitis, seborrheic

## 2018-08-10 NOTE — NURSING NOTE
Oumou Carolina's goals for this visit include:   Chief Complaint   Patient presents with     Skin Check     Spots of concern: left cheek, hairline, and spot on mid chest.        She requests these members of her care team be copied on today's visit information: PCP    PCP: Florinda Eaton    Referring Provider:  No referring provider defined for this encounter.    There were no vitals taken for this visit.    Do you need any medication refills at today's visit? None    Flakita Ventura, [facility]

## 2018-08-16 ENCOUNTER — TELEPHONE (OUTPATIENT)
Dept: DERMATOLOGY | Facility: CLINIC | Age: 53
End: 2018-08-16

## 2018-08-16 NOTE — TELEPHONE ENCOUNTER
[facility] Call Center    Phone Message    May a detailed message be left on voicemail: yes    Reason for Call: PT called about biopsy results. Under the lab tab it says it's in process so I let pt know that. Just wanted to let Dr. Peters's team that pt called us about it.    Action Taken: Message routed to:  [facility] DERM

## 2018-08-17 LAB — COPATH REPORT: NORMAL

## 2018-08-20 ENCOUNTER — TELEPHONE (OUTPATIENT)
Dept: DERMATOLOGY | Facility: CLINIC | Age: 53
End: 2018-08-20

## 2018-08-20 NOTE — TELEPHONE ENCOUNTER
Notes Recorded by Marybeth Allred RN on 8/20/2018 at 4:42 PM  Pt called back and notified of results. Pt verbalized understanding. Pt states that she would prefer to schedule the Mohs surgery. Pt scheduled on 8/27/18 at 7:30am. Previsit questions reviewed with pt. Letter sent..Marybeth Allred RN    ------    Notes Recorded by Marybeth Allred RN on 8/20/2018 at 11:31 AM  Pt called, no answer. VM Id's pt. Left message for pt to call the [facility] back at [phone]. .Marybeth Allred RN    ------    Notes Recorded by Indra Hyde MD on 8/17/2018 at 4:45 PM  If the patient is comfortable scheduling Mohs surgery without a consult, that is ok with me.   Please complete the pre-Mohs screening questions. Thank you.  ------    Notes Recorded by Lulu Plummer CMA on 8/17/2018 at 3:48 PM  Patient called wanting biopsy results.  I informed her of her results and told her that we will call her back after we have Dr. Hyde look at her results.    Dr. Hyde,    Please see results and photos that are in the chart and advise.  Patient has had Mohs in the past.  Do you want a consult or for us to schedule.    Thank you    Lulu Plummer CMA    ------    Notes Recorded by Sonal Peters MD on 8/17/2018 at 10:37 AM  BCC, recommend mohs for angle of the mandible  B is folliculitis, will recheck at follow up  C is AK, recheck at follow up        10d ago       Copath Report Patient Name: ALENA AVENDAÑO   MR#: [id number]   Specimen #: [id number]   Collected: 8/10/2018   Received: 8/13/2018   Reported: 8/17/2018 09:19   Ordering Phy(s): SONAL PETERS     For improved result formatting, select 'View Enhanced Report Format' under    Linked Documents section.     SPECIMEN(S):   A: Skin, left angle of mandible   B: Skin, central chest   C: Skin, left chest     FINAL DIAGNOSIS:   A. Skin, left angle of mandible:   - Basal cell carcinoma, nodular type, extending to the deep and lateral   margins (see description)     B. Skin,  central chest:   - Features suggestive of folliculitis  (see description)     C. Skin, left chest:   - Pigmented actinic keratosis - (see description)                MOHS previsit information                                                    Oumou Carolina is a 53 year old female     Patient is being referred to Dr. Hyde   Referring Provider: Fran   For treatment of: basal cell carcinoma  Site(s): Left mandible   -if site is groin or below the knee send to RN to initiate antibiotic prophylaxis protocol.  Previous Records received:  NO    Medication & Allergy Information                                                    CURRENT MEDICATIONS:   Current Outpatient Prescriptions   Medication Sig Dispense Refill     fluocinonide (LIDEX) 0.05 % solution Apply daily for up to 1 week for flares 60 mL 1       Do you take the following medications:  Aspirin:  NO  Ibuprofen/Advil/Motrin, Aleve/Naproxen:   NO  Coumadin, Eliquis, Pradaxa, Xarelto:   NO   -If on Coumadin, INR should be checked within 7 days of surgery  Vitamin E:   NO  Plavix:   NO    Lindstrom's wart: NO   Garlic supplement:  NO   Fish Oil: NO  Antibiotic Prophylaxis:  NO    Do you have an ALLERGIC REACTION to any medications:  NO   No known drug allergies    Past Medical History                                                    Do you currently or have you previously had any of the following conditions:       HIV/AIDS:  NO    Hepatitis:  NO    Suppressed Immune System:  NO    Autoimmune disorder (eg RA, Lupus):  NO    Prolonged bleeding or bleeding disorder:  NO    Fever blisters/herpes, cold sores:  NO    Kidney disease:  NO  Creatinine   Date Value Ref Range Status   09/28/2017 0.67 0.52 - 1.04 mg/dL Final   ]    Pacemaker or Defibrillator:  NO.      History of artificial or heart valve replacement:  NO.      Endocarditis: NO    Organ transplant: NO.     Joint replacement within past 2 years: NO    Previous prosthetic joint infection: NO    Diabetes:  NO    Pregnant:: NO    Keloids or Abnormal scars: NO    Mobility device (wheelchair, transfer difficulty): NO    Tobacco use:  NO.    History   Smoking Status     Never Smoker   Smokeless Tobacco     Never Used       If any positives, send to RN to initiate antibiotic prophylaxis protocol and/or anticoagulation management protocol    Reviewed by:  Marybeth Allred RN

## 2018-08-20 NOTE — LETTER
"        Ms.Kristine TU Carolina  [address] [place] [id number]        August 20, 2018    Dear MsFinnCaity,    You have an upcoming appointment with Dr. Hyde for Mohs surgery. It is scheduled for 8/27/18 at 7:30 AM. Please check-in 15 minutes prior to your appointment at check-in desk \"D\" on the 2nd floor. Our address is [address] [place] [address].  Please review these important reminders:    1) Expect to be here the majority of the morning.  The Mohs surgical procedure can be an extensive surgery requiring multiple stages. Each stage may take 1-2 hours.     2) You may eat breakfast. You may bring snacks or beverages with you.  3) Take all normal medications morning of surgery -- unless otherwise indicated by your physician   If you have an artificial heart valve we will prescribe an antibiotic. Please take one hour prior to surgery.     4) You will need a  to be with you if your surgical site is close to your eyes. You can get swelling/bruising immediately after surgery and will go home with a big bulky bandage that could obstruct your view of driving safely.     5) Wear comfortable clothing -- preferably a button down shirt or a loose fitted shirt (to avoid pulling over pressure bandage off when you get home). If your surgery site is on your leg, try wearing loose fitted pants. If your surgery site is on your arm, try wearing a short-sleeve shirt.     6) Bring reading material or other items to help pass time. We do have internet access available if you own a laptop, iPad, etc.    7) Women - do NOT wear make-up. We will be washing it off anyone needing surgery on the face     8) Do NOT stop blood thinning medication unless instructed to do so by your surgeon. This will include: Warfarin, Coumadin, Eliquis, Jantoven, Aspirin, Plavix and Pradaxa. If you are taking Warfarin or Coumadin, please have your INR blood test results sent to our office no more than 7 days prior to your surgery.     9) Tylenol is " a good alternative to take for headaches and pain, and can be taken throughout your surgery and postoperative recovery.    If you need to reschedule or have any questions or concerns, please call me at [phone].    Sincerely,      Marybeth Allred RN

## 2018-08-27 ENCOUNTER — OFFICE VISIT (OUTPATIENT)
Dept: DERMATOLOGY | Facility: CLINIC | Age: 53
End: 2018-08-27
Payer: COMMERCIAL

## 2018-08-27 VITALS — SYSTOLIC BLOOD PRESSURE: 121 MMHG | DIASTOLIC BLOOD PRESSURE: 73 MMHG | OXYGEN SATURATION: 100 % | HEART RATE: 63 BPM

## 2018-08-27 DIAGNOSIS — C44.319 BASAL CELL CARCINOMA OF JAWLINE: Primary | ICD-10-CM

## 2018-08-27 PROCEDURE — 17311 MOHS 1 STAGE H/N/HF/G: CPT | Performed by: DERMATOLOGY

## 2018-08-27 PROCEDURE — 12052 INTMD RPR FACE/MM 2.6-5.0 CM: CPT | Performed by: DERMATOLOGY

## 2018-08-27 ASSESSMENT — PAIN SCALES - GENERAL: PAINLEVEL: NO PAIN (0)

## 2018-08-27 NOTE — PROGRESS NOTES
DERMATOLOGIC SURGERY REPORT    NAME OF PROCEDURE:  MOHS MICROGRAPHIC SURGERY    Surgeon: Indra Hyde DO  Fellow:  None  Resident: None  Mohs ID: [id number]    PREOPERATIVE DIAGNOSIS:   Primary nodular basal cell carcinoma of the left angle of mandible  POSTOPERATIVE DIAGNOSIS:   Same    INDICATIONS:  This patient presented with a primary nodular basal cell carcinoma located on the left angle of mandible, measuring 0.5 cm x 0.5 cm.  Because of its size, location and morphology, Mohs surgery was indicated.  After appropriate discussion and informed consent the patient underwent Mohs surgery using the fresh tissue technique as follows:    STAGE I:  The patient was placed on the operating room table.  The area was infiltrated with 1% lidocaine and epinephrine. Using a #15-blade, complete excision was made around the tumor in one section.  Hemostasis was obtained by electrodessication.  A dressing was placed.  Tissue was kept as one tissue block which was subsequently mapped, color coded at their margins and processed in the Mohs Laboratory.  Microscopic tumor was not found in the tissue blocks.    With the lesion clear of micrographic tumor, surgery was considered complete.  The defect extended to the subcutaneous fat and measured 1.0 cm x 1.1 cm. After discussion with patient, the defect was reconstructed.    Indra Hyde DO             Test Performed at:   Dept. of Dermatology   [facility]    [facility]    [address] [place] [address]          [phone]      DERMATOLOGIC SURGERY REPORT    NAME OF PROCEDURE: INTERMEDIATE LINEAR LAYERED CLOSURE    Surgeon: Indra Hyde DO  Fellow:  None  Resident: None    PREOPERATIVE DIAGNOSIS:   Status post Mohs excision of a primary nodular basal cell carcinoma  POSTOPERATIVE DIAGNOSIS:  Same  FINAL REPAIR LENGTH: 3.0 cm    INDICATIONS:  This patient was left with a 1.0 cm x 1.1 cm defect  involving the left angle of mandible following Mohs excision of a primary nodular basal cell carcinoma. In order to repair this defect while maintaining the normal anatomic relations and function, we elected to utilize an intermediate linear closure.  We discussed the principles of treatment and most likely complications including bleeding, infection, wound dehiscence and scarring.  Informed consent was obtained and the patient underwent the procedure as follows.    PROCEDURE:  The patient was taken to the operative suite and placed on the operating room table.  The treatment area was anesthetized with 1% Lidocaine and epinephrine.  The area was washed with Hibiclens, rinsed with saline and draped with sterile towels.  The wound edges were undermined.  Hemostasis was obtained by electrocoagulation.  Closure was oriented so that the wound was in the patient's natural skin tension lines.  Deeper layers of subcutaneous tissue were undermined first.  Deep dermal and subcutaneous closure was performed using 5-0 Vicryl deep, intradermal and subcutaneous sutures.  Two standing cones were removed by triangulation.  Final cutaneous approximation was achieved with 5-0 Fast absorbing gut simple running sutures.       The final repair length was 3.0 cm.  Total anesthesia used was 3.0 ml and estimated blood loss was less than 5.0 ml.  A sterile pressure dressing was applied and wound care instructions, with a written handout, were given.  The patient was discharged from the [facility] alert and ambulatory.    Indra Hyde DO             Test Performed at:   Dept. of Dermatology   [facility]    Dermatologic Surgery & [facility] Center    [address] [place] [address]          [phone]

## 2018-08-27 NOTE — NURSING NOTE
Oumou Carolina's goals for this visit include: MOHS BCC left mandible  She requests these members of her care team be copied on today's visit information:     PCP: Florinda Eaton    Referring Provider:  No referring provider defined for this encounter.    /73 (BP Location: Right arm, Patient Position: Sitting, Cuff Size: Adult Regular)  Pulse 63  SpO2 100%    Do you need any medication refills at today's visit? Veronika Mims LPN

## 2018-08-27 NOTE — MR AVS SNAPSHOT
After Visit Summary   8/27/2018    Oumou Carolina    MRN: [id number]           Patient Information     Date Of Birth          1965        Visit Information        Provider Department      8/27/2018 7:30 AM Indra Hyde MD [facility]        Care Instructions    Mohs Wound Care Instructions  I will experience scar, altered skin color, bleeding, swelling, pain, crusting and redness. I may experience altered sensation. Risks are excessive bleeding, infection, muscle weakness, thick (hypertrophic or keloidal) scar, and recurrence,. A second procedure may be recommended to obtain the best cosmetic or functional result.  Possible complications of any surgical procedure are bleeding, infection, scarring, alteration in skin color and sensation, muscle weakness in the area, wound dehiscence or seperation, or recurrence of the lesion or disease. On occasion, after healing, a secondary procedure or revision may be recommended in order to obtain the best cosmetic or functional result.   After your surgery, a pressure bandage will be placed over the area that has sutures. This will help prevent bleeding. Please follow these instructions, as they will help you to prevent complications as your wound heals.  For the First 48 hours After Surgery:  1. Leave the pressure bandage on and keep it dry. If it should come loose, you may retape it, but do not take it off.  2. Relax and take it easy. Do not do any vigorous exercise, heavy lifting, or bending forward. This could cause the wound to bleed.  3. Post-operative pain is usually mild. You may take plain or extra strength Tylenol every 4 hours as needed (do not take more than 4,000mg in one day). Do not take any medicine that contains aspirin, ibuprofen or motrin unless you have been recommended these by a doctor.  Avoid alcohol and vitamin E as these may increase your tendency to bleed.  4. You may put an ice pack around the bandaged area  for 20 minutes every 2-3 hours. This may help reduce swelling, bruising, and pain. Make sure the ice pack is waterproof so that the pressure bandage does not get wet.   5. You may see a small amount of drainage or blood on your pressure bandage. This is normal. However, if drainage or bleeding continues or saturates the bandage, you will need to apply firm pressure over the bandage with a washcloth for 15 minutes. If bleeding continues after applying pressure for 15 minutes then go to the nearest emergency room.  48 Hours After Surgery  Carefully remove the bandage and start daily wound care and dressing changes. You may also now shower and get the wound wet. Wash wound with a mild soap and water.  Use caution when washing the wound. Be gentle and do not let the forceful shower stream hit the wound directly.  PAT dry.  Daily Wound Care:  1. Wash wound with a mild soap and water.  Use caution when washing the wound, be gentle and do not let the forceful shower stream hit the wound directly.  2. PAT DRY.  3. Apply Vaseline (from a new container or tube) over the suture line with a Q-tip. It is very important to keep the wound continuously moist, as wounds heal best in a moist environment.  4.  Keep the site covered until sutures are removed, you can cover it with a Telfa (non-stick) dressing and tape or a band-aid.    5. If you are unable to keep wound covered, you must apply Vaseline every 2 - 3 hours (while awake) to ensure it is being kept moist for optimal healing. A dressing overnight is recommended to keep the area moist.   Call Us If:  1. You have pain that is not controlled with Tylenol.  2. You have signs or symptoms of an infection, such as: fever over 100 degrees F, redness, warmth, or foul-smelling or yellow/creamy drainage from the wound.  Who should I call with questions?    [facility]: [phone]     [facility]: [phone]    For  urgent needs outside of business hours call the [facility] at [phone] and ask for the dermatology resident on call              Follow-ups after your visit        Your next 10 appointments already scheduled     Feb 08, 2019  8:15 AM CST   [facility] Visit with Sonal Peters MD   [facility]    [address] [address] [place] [id number] [phone]              Who to contact     If you have questions or need follow up information about today's clinic visit or your schedule please contact [facility] directly at [phone].  Normal or non-critical lab and imaging results will be communicated to you by [employer], letter or phone within 4 business days after the clinic has received the results. If you do not hear from us within 7 days, please contact the clinic through [employer] or phone. If you have a critical or abnormal lab result, we will notify you by phone as soon as possible.  Submit refill requests through [employer] or call your pharmacy and they will forward the refill request to us. Please allow 3 business days for your refill to be completed.          Additional Information About Your Visit        [employer] Information     [employer] gives you secure access to your electronic health record. If you see a primary care provider, you can also send messages to your care team and make appointments. If you have questions, please call your primary care clinic.  If you do not have a primary care provider, please call [phone] and they will assist you.      [employer] is an electronic gateway that provides easy, online access to your medical records. With [employer], you can request a clinic appointment, read your test results, renew a prescription or communicate with your care team.     To access your existing account, please contact your [facility] or call [phone] for assistance.        Care EveryWhere ID     This  is your Care EveryWhere ID. This could be used by other organizations to access your [facility] medical records  [id number]        Your Vitals Were     Pulse Pulse Oximetry                63 100%           Blood Pressure from Last 3 Encounters:   08/27/18 121/73   01/22/18 146/77   09/28/17 (!) 132/92    Weight from Last 3 Encounters:   01/22/18 79.4 kg (175 lb)   09/28/17 78.5 kg (173 lb)   11/28/16 75.8 kg (167 lb)              Today, you had the following     No orders found for display       Primary Care Provider Office Phone # Fax #    Florinda TORIBIO [facility] [phone] [address] [facility] [address]        Equal Access to Services     LANG CHACKO : Hadii aad ku hadasho Soomaali, waaxda luqadaha, qaybta kaalmada adeegyada, mayank [place] . So [facility] [phone].    ATENCIÓN: Si habla español, tiene a avendano disposición servicios gratuitos de asistencia lingüística. Llame al [phone].    We comply with applicable federal civil rights laws and [place] laws. We do not discriminate on the basis of race, color, national origin, age, disability, sex, sexual orientation, or gender identity.            Thank you!     Thank you for choosing [facility]  for your care. Our goal is always to provide you with excellent care. Hearing back from our patients is one way we can continue to improve our services. Please take a few minutes to complete the written survey that you may receive in the mail after your visit with us. Thank you!             Your Updated Medication List - Protect others around you: Learn how to safely use, store and throw away your medicines at www.disposemymeds.org.          This list is accurate as of 8/27/18  9:50 AM.  Always use your most recent med list.                   Brand Name Dispense Instructions for use Diagnosis    fluocinonide 0.05 % solution    LIDEX    60 mL    Apply daily for up to 1 week for flares    Dermatitis,  seborrheic

## 2018-08-27 NOTE — PATIENT INSTRUCTIONS
Mohs Wound Care Instructions  I will experience scar, altered skin color, bleeding, swelling, pain, crusting and redness. I may experience altered sensation. Risks are excessive bleeding, infection, muscle weakness, thick (hypertrophic or keloidal) scar, and recurrence,. A second procedure may be recommended to obtain the best cosmetic or functional result.  Possible complications of any surgical procedure are bleeding, infection, scarring, alteration in skin color and sensation, muscle weakness in the area, wound dehiscence or seperation, or recurrence of the lesion or disease. On occasion, after healing, a secondary procedure or revision may be recommended in order to obtain the best cosmetic or functional result.   After your surgery, a pressure bandage will be placed over the area that has sutures. This will help prevent bleeding. Please follow these instructions, as they will help you to prevent complications as your wound heals.  For the First 48 hours After Surgery:  1. Leave the pressure bandage on and keep it dry. If it should come loose, you may retape it, but do not take it off.  2. Relax and take it easy. Do not do any vigorous exercise, heavy lifting, or bending forward. This could cause the wound to bleed.  3. Post-operative pain is usually mild. You may take plain or extra strength Tylenol every 4 hours as needed (do not take more than 4,000mg in one day). Do not take any medicine that contains aspirin, ibuprofen or motrin unless you have been recommended these by a doctor.  Avoid alcohol and vitamin E as these may increase your tendency to bleed.  4. You may put an ice pack around the bandaged area for 20 minutes every 2-3 hours. This may help reduce swelling, bruising, and pain. Make sure the ice pack is waterproof so that the pressure bandage does not get wet.   5. You may see a small amount of drainage or blood on your pressure bandage. This is normal. However, if drainage or bleeding continues or  saturates the bandage, you will need to apply firm pressure over the bandage with a washcloth for 15 minutes. If bleeding continues after applying pressure for 15 minutes then go to the nearest emergency room.  48 Hours After Surgery  Carefully remove the bandage and start daily wound care and dressing changes. You may also now shower and get the wound wet. Wash wound with a mild soap and water.  Use caution when washing the wound. Be gentle and do not let the forceful shower stream hit the wound directly.  PAT dry.  Daily Wound Care:  1. Wash wound with a mild soap and water.  Use caution when washing the wound, be gentle and do not let the forceful shower stream hit the wound directly.  2. PAT DRY.  3. Apply Vaseline (from a new container or tube) over the suture line with a Q-tip. It is very important to keep the wound continuously moist, as wounds heal best in a moist environment.  4.  Keep the site covered until sutures are removed, you can cover it with a Telfa (non-stick) dressing and tape or a band-aid.    5. If you are unable to keep wound covered, you must apply Vaseline every 2 - 3 hours (while awake) to ensure it is being kept moist for optimal healing. A dressing overnight is recommended to keep the area moist.   Call Us If:  1. You have pain that is not controlled with Tylenol.  2. You have signs or symptoms of an infection, such as: fever over 100 degrees F, redness, warmth, or foul-smelling or yellow/creamy drainage from the wound.  Who should I call with questions?    [facility]: [phone]     [facility]: [phone]    For urgent needs outside of business hours call the [facility] at [phone] and ask for the dermatology resident on call

## 2018-09-04 ENCOUNTER — TELEPHONE (OUTPATIENT)
Dept: FAMILY MEDICINE | Facility: CLINIC | Age: 53
End: 2018-09-04

## 2018-09-04 NOTE — LETTER
[facility]  [address], [address]  [place] [address]  Phone: [phone]  Fax: [phone]  September 4, 2018      Oumou Carolina  [address] [place] [address]      Dear Oumou,    We care about your health and have reviewed your health plan including your medical conditions, medications, and lab results.  Based on this review, it is recommended that you follow up regarding the following health topic(s):  -Colon Cancer Screening    We recommend you take the following action(s):  -schedule a COLONOSCOPY to look for colon cancer (due every 10 years or 5 years in higher risk situations.)  Colonoscopies can prevent 90-95% of colon cancer deaths.  Problem lesions can be removed before they ever become cancer.  If you do not wish to do a colonoscopy or cannot afford to do one at this time, there is another option called a Fecal Immunochemical Occult Blood Test (FIT) a take home stool sample kit.  It does not replace the colonoscopy for colorectal cancer screening, but it can detect hidden bleeding in the lower colon.  It does need to be repeated every year and if a positive result is obtained, you would be referred for a colonoscopy.  If you have completed either one of these tests at another facility, please have the records sent to our clinic for our records.     Please call us at the [facility] - [phone] (or use [facility]) to address the above recommendations.     Thank you for trusting [facility] and we appreciate the opportunity to serve you.  We look forward to supporting your healthcare needs in the future.    Healthy Regards,    Your Health Care Team  [facility]

## 2018-09-04 NOTE — TELEPHONE ENCOUNTER
Summary:    Patient is due/failing the following:   FIT    Action needed:   Complete a FIT test    Type of outreach:    Sent letter.    Questions for provider review:    None                                                                                                                                    Yesika Baig       Chart routed to Care Team .          Panel Management Review      Patient has the following on her problem list:     Hypertension   Last three blood pressure readings:  BP Readings from Last 3 Encounters:   08/27/18 121/73   01/22/18 146/77   09/28/17 (!) 132/92     Blood pressure: Passed    HTN Guidelines:  Age 18-59 BP range:  Less than 140/90  Age 60-85 with Diabetes:  Less than 140/90  Age 60-85 without Diabetes:  less than 150/90      Composite cancer screening  Chart review shows that this patient is due/due soon for the following Fecal Colorectal (FIT)

## 2018-11-12 ENCOUNTER — TRANSFERRED RECORDS (OUTPATIENT)
Dept: HEALTH INFORMATION MANAGEMENT | Facility: CLINIC | Age: 53
End: 2018-11-12

## 2018-11-19 ENCOUNTER — OFFICE VISIT (OUTPATIENT)
Dept: DERMATOLOGY | Facility: CLINIC | Age: 53
End: 2018-11-19
Payer: COMMERCIAL

## 2018-11-19 DIAGNOSIS — L57.8 SUN-DAMAGED SKIN: Primary | ICD-10-CM

## 2018-11-19 DIAGNOSIS — L57.0 AK (ACTINIC KERATOSIS): ICD-10-CM

## 2018-11-19 DIAGNOSIS — L73.8 BACTERIAL FOLLICULITIS: ICD-10-CM

## 2018-11-19 DIAGNOSIS — L82.1 SEBORRHEIC KERATOSIS: ICD-10-CM

## 2018-11-19 DIAGNOSIS — D22.9 MULTIPLE BENIGN NEVI: ICD-10-CM

## 2018-11-19 DIAGNOSIS — Z85.828 HISTORY OF NONMELANOMA SKIN CANCER: ICD-10-CM

## 2018-11-19 DIAGNOSIS — Z80.8 FAMILY HISTORY OF MELANOMA: ICD-10-CM

## 2018-11-19 DIAGNOSIS — L81.4 SOLAR LENTIGO: ICD-10-CM

## 2018-11-19 PROCEDURE — 17003 DESTRUCT PREMALG LES 2-14: CPT | Performed by: DERMATOLOGY

## 2018-11-19 PROCEDURE — 17000 DESTRUCT PREMALG LESION: CPT | Performed by: DERMATOLOGY

## 2018-11-19 PROCEDURE — 99213 OFFICE O/P EST LOW 20 MIN: CPT | Mod: 25 | Performed by: DERMATOLOGY

## 2018-11-19 NOTE — MR AVS SNAPSHOT
After Visit Summary   11/19/2018    Oumou Carolina    MRN: [id number]           Patient Information     Date Of Birth          1965        Visit Information        Provider Department      11/19/2018 9:00 AM Danae Ortiz MD [facility]        Care Instructions    Start over-the-counter benzoyl peroxide 10% wash on the body as a body wash.  If 10% is too irritating you can use the 5%. ([employer] makes this product. It is available here at the pharmacy or at [employer]). This medication can bleach your towels and clothing.     It is found in a purple tube in the acne aisle.             Cryotherapy    What is it?    Use of a very cold liquid, such as liquid nitrogen, to freeze and destroy abnormal skin cells that need to be removed    What should I expect?    Tenderness and redness    A small blister that might grow and fill with dark purple blood. There may be crusting.    More than one treatment may be needed if the lesions do not go away.    How do I care for the treated area?    Gently wash the area with your hands when bathing.    Use a thin layer of Vaseline to help with healing. You may use a Band-Aid.     The area should heal within 7-10 days and may leave behind a pink or lighter color.     Do not use an antibiotic or Neosporin ointment.     You may take acetaminophen (Tylenol) for pain.     Call your Doctor if you have:    Severe pain    Signs of infection (warmth, redness, cloudy yellow drainage, and or a bad smell)    Questions or concerns    Who should I call with questions?       [facility]: [phone]       [facility]: [phone]       For urgent needs outside of business hours call the [facility] at [phone]        and ask for the dermatology resident on call                Follow-ups after your visit        Your next 10 appointments already scheduled     Feb 15, 2019  8:15 AM CST    [facility]    [address] [address] [place] [address] [phone]              Who to contact     If you have questions or need follow up information about today's clinic visit or your schedule please contact [facility] directly at [phone].  Normal or non-critical lab and imaging results will be communicated to you by [employer], letter or phone within 4 business days after the clinic has received the results. If you do not hear from us within 7 days, please contact the clinic through [employer] or phone. If you have a critical or abnormal lab result, we will notify you by phone as soon as possible.  Submit refill requests through [employer] or call your pharmacy and they will forward the refill request to us. Please allow 3 business days for your refill to be completed.          Additional Information About Your Visit        Expert Plan[employer]arPeople and Pages Information     [employer] gives you secure access to your electronic health record. If you see a primary care provider, you can also send messages to your care team and make appointments. If you have questions, please call your primary care clinic.  If you do not have a primary care provider, please call [phone] and they will assist you.      [employer] is an electronic gateway that provides easy, online access to your medical records. With [employer], you can request a clinic appointment, read your test results, renew a prescription or communicate with your care team.     To access your existing account, please contact your [facility] or call [phone] for assistance.        Care EveryWhere ID     This is your Care EveryWhere ID. This could be used by other organizations to access your [place] medical records  [id number]         Blood Pressure from Last 3 Encounters:   08/27/18 121/73   01/22/18 146/77   09/28/17 (!) 132/92    Weight from Last  3 Encounters:   01/22/18 79.4 kg (175 lb)   09/28/17 78.5 kg (173 lb)   11/28/16 75.8 kg (167 lb)              Today, you had the following     No orders found for display       Primary Care Provider Office Phone # Fax #    TORIBIO [facility] [phone] [address] [address]        Equal Access to Services     LANG CHACKO : Hadii aad ku hadasho Soomaali, waaxda luqadaha, qaybta kaalmada adeegyada, waxay idiin hayaan adeeg kharash ladavid . So [facility] [phone].    ATENCIÓN: Si habla español, tiene a avendano disposición servicios gratuitos de asistencia lingüística. Llame al [phone].    We comply with applicable federal civil rights laws and [place] laws. We do not discriminate on the basis of race, color, national origin, age, disability, sex, sexual orientation, or gender identity.            Thank you!     Thank you for choosing [facility]  for your care. Our goal is always to provide you with excellent care. Hearing back from our patients is one way we can continue to improve our services. Please take a few minutes to complete the written survey that you may receive in the mail after your visit with us. Thank you!             Your Updated Medication List - Protect others around you: Learn how to safely use, store and throw away your medicines at www.disposemymeds.org.          This list is accurate as of 11/19/18  9:13 AM.  Always use your most recent med list.                   Brand Name Dispense Instructions for use Diagnosis    fluocinonide 0.05 % solution    LIDEX    60 mL    Apply daily for up to 1 week for flares    Dermatitis, seborrheic

## 2018-11-19 NOTE — NURSING NOTE
Oumou Carolina's goals for this visit include: skin check, hx of BCC  She requests these members of her care team be copied on today's visit information: no    PCP: Florinda Eaton    Referring Provider:  Referred Self, MD  No address on file    There were no vitals taken for this visit.    Do you need any medication refills at today's visit? No    Gay De La Garza LPN

## 2018-11-19 NOTE — LETTER
11/19/2018         RE: Oumou Carolina  [address] [address]        Dear Colleague,    Thank you for referring your patient, Oumou Carolina, to the [facility]. Please see a copy of my visit note below.    [facility] Dermatology Note      Dermatology Problem List:  1. Family history of melanoma (mother)  2. NMSC  -BCC left jaw line, s/p excision 8/27/2018  -BCC, central abdomen, s/p excision 11/25/2015  -BCC, nodular, left frontal scalp, s/p Mohs 2/10/15  -SCCIS, left chest, s/p excision 2/10/15  -BCC, nodular with fibrosis, right chest, s/p excision 11/4/14   3. Actinic Keratosis  -Pigmented AK, left chest, s/p biopsy 8/10/2018  -Previous Tx: Efudex 3/2015, cryotherapy  4. Seborrheic dermatitis  -lidex 8/2018  5. Folliculitis, central chest, s/p biopsy 8/10/2018  6. EIC vs dermatofibroma, mid central back    Encounter Date: Nov 19, 2018    CC:  Chief Complaint   Patient presents with     Skin Check     hx of BCC,  4 lesions on face, one on back         History of Present Illness:  Ms. Oumou Carolina is a 53 year old female who presents for a few loesions of concern in the setting of history of NMSC. She was last seen 8/27/2018 by Dr. Hyde for excision of a BCC on her left jaw line. Today she is here for a check for areas of concern that have appeared since her last check with Dr. Peters in August. She has lesions of concern on her face and on her back. The lesion on her back has been there awhile and is not tender or rapidly growing. She feels it when she sits with something that puts pressure in the area.  She is watching the lesions on her face, and would like them looked at. She thinks they seem similar to the BCC she had on the jawline recently. Not tender, not bleeding. She uses Caress body wash in the shower.     No other concerns addressed today.    Past Medical History:   Patient Active Problem List   Diagnosis     Persistent disorder of  initiating or maintaining sleep     Hyperlipidemia LDL goal <160     Sinus bradycardia     Hammer toe     Hx of hysterectomy     Basal cell carcinoma of anterior chest s/p excision 11-4-14     Squamous cell carcinoma in situ left chest s/p excision 2-10-15     Basal cell carcinoma of left frontal scalp (hairline) s/p mohs 2-10-15     Elevated blood pressure reading without diagnosis of hypertension     Past Medical History:   Diagnosis Date     Allergy, unspecified not elsewhere classified 2003    s/p desensitization     Anxiety      Atrial tachycardia (H)     ablation 3/24/11     Bartholin's abscess 7/09     Basal cell carcinoma of anterior chest s/p excision 11-4-14 11/4/2014     Bradycardia      Excessive or frequent menstruation 2001    s/p TVH     Transient disorder of initiating or maintaining sleep 2005     Past Surgical History:   Procedure Laterality Date     ABDOMEN SURGERY  2001    Hysterectomy     ARTHROPLASTY TOE(S)  12/2/2013    Procedure: ARTHROPLASTY TOE(S);;  Surgeon: Vinayak Palacios DPM;  Location: [facility] OR     BIOPSY  2014, 2016, 2017    skin cancer biopsies, multiple     BUNIONECTOMY  12/2/2013    Procedure: BUNIONECTOMY;  Bunionectomy, resection of arthroplasty of PIP joint right foot;  Surgeon: Vinayak Palacios DPM;  Location:  OR     C LIGATE FALLOPIAN TUBE       CARDIAC SURGERY  ablation for abnormal rhythym    2013     COSMETIC SURGERY  abdomiplasty, lipo    6/2016, 4/2017     EYE SURGERY  lasik    11/2007     HC REVISE MEDIAN N/CARPAL TUNNEL SURG      bilateral carpal tunnel repair     HYSTERECTOMY, PAP NO LONGER INDICATED  2001     HYSTERECTOMY, VAGINAL  1/30/01    TVH for menorraghia     REPAIR HAMMER TOE  12/19/2011    Procedure:REPAIR HAMMER TOE; Arthroplasty PIPJ 5th toe, left; Surgeon:VINAYAK PALACIOS; Location:[facility] OR     SURGICAL HISTORY OF -   8/09    Bilat LE Laser ablation, varicose veins     SURGICAL HISTORY OF -   3/24/11    atrial ablation     Social History:  The patient  works as a .  Patient has a history of tanning bed usage.     Family History:  There is a family history of melanoma in the patient's mother.    Medications:  Current Outpatient Prescriptions   Medication Sig Dispense Refill     fluocinonide (LIDEX) 0.05 % solution Apply daily for up to 1 week for flares (Patient not taking: Reported on 8/27/2018) 60 mL 1     Allergies   Allergen Reactions     No Known Drug Allergies      Review of Systems:  -Skin: As above in HPI. No additional skin concerns.  -Const: The patient is generally feeling well today.     Physical exam:  GEN: This is a well developed, well-nourished female in no acute distress, in a pleasant mood.    SKIN: Waist-up skin, which includes the head/face, neck, both arms, chest, back, abdomen, digits and/or nails was examined.  - Orellana Type II  - Scattered brown macules on sun exposed areas.  - skin colored papule on central mid back that dimples with alteral pressure, EIC vs dermatofibroma  - well healed scar at left jaw line and in other areas of past NMSC  - There are waxy stuck on tan to brown papules on the face.  - There are erythematous macules with overyling adherent scale on the left cheek x 2.   - Multiple regular brown pigmented macules and papules are identified on the neck.   - pink follicular based papules x 4 on the left lateral chest  - No other lesions of concern on areas examined.     Impression/Plan:  1. Family history of melanoma    Last total skin exam 8/10/2018, will repeat in 3 months    2. History of nonmelanoma skin cancer- No evidence of recurrence    Recommend sunscreens SPF #30 or greater, protective clothing and avoidance of tanning beds.    3. Sun damaged skin with solar lentigines    Recommend sunscreens SPF #30 or greater, protective clothing and avoidance of tanning beds.    4. Benign findings including: seborrheic keratoses    No further intervention required. Patient to report changes.     Patient  reassured of the benign nature of these lesions.    5. Skin colored papule on central mid back that dimples with lateral pressure, EIC vs dermatofibroma    Patient reassured of the benign nature of these lesions.    No further intervention required. Patient to report changes.     6. Actinic keratosis. Discussed precancerous nature of these lesions. Recommended treatment to prevent progression to a squamous cell carcinoma.     Cryotherapy procedure note: After verbal consent and discussion of risks and benefits including but no limited to dyspigmentation/scar, blister, and pain, 2 were treated with 1-2mm freeze border for 1 cycle with liquid nitrogen. Post cryotherapy instructions were provided.    7. Folliculitis    Recommended using a BPO as a body wash in the shower for prevention.     8. Multiple benign nevi     No further intervention required at this time.    Patient reassured of the benign nature of these lesions.      Follow up in 3 months for next total body skin check.    Staff Involved:  Scribe/Staff    Scribe Disclosure  I, Moraima Maxwell, am serving as a scribe to document services personally performed by Dr. Danae Ortiz MD, based on data collection and the provider's statements to me.     Provider Disclosure:   The documentation recorded by the scribe accurately reflects the services I personally performed and the decisions made by me.    Danae Ortiz MD    Department of Dermatology  [facility] [place][note 2]: Phone: [phone], Fax:[phone]  [facility]: Phone: [phone], Fax: [phone]          Again, thank you for allowing me to participate in the care of your patient.        Sincerely,        Danae Ortiz MD

## 2018-11-19 NOTE — PROGRESS NOTES
[facility] Dermatology Note      Dermatology Problem List:  1. Family history of melanoma (mother)  2. NMSC  -BCC left jaw line, s/p excision 8/27/2018  -BCC, central abdomen, s/p excision 11/25/2015  -BCC, nodular, left frontal scalp, s/p Mohs 2/10/15  -SCCIS, left chest, s/p excision 2/10/15  -BCC, nodular with fibrosis, right chest, s/p excision 11/4/14   3. Actinic Keratosis  -Pigmented AK, left chest, s/p biopsy 8/10/2018  -Previous Tx: Efudex 3/2015, cryotherapy  4. Seborrheic dermatitis  -lidex 8/2018  5. Folliculitis, central chest, s/p biopsy 8/10/2018  6. EIC vs dermatofibroma, mid central back    Encounter Date: Nov 19, 2018    CC:  Chief Complaint   Patient presents with     Skin Check     hx of BCC,  4 lesions on face, one on back         History of Present Illness:  Ms. Oumou Carolina is a 53 year old female who presents for a few loesions of concern in the setting of history of NMSC. She was last seen 8/27/2018 by Dr. Hyde for excision of a BCC on her left jaw line. Today she is here for a check for areas of concern that have appeared since her last check with Dr. Peters in August. She has lesions of concern on her face and on her back. The lesion on her back has been there awhile and is not tender or rapidly growing. She feels it when she sits with something that puts pressure in the area.  She is watching the lesions on her face, and would like them looked at. She thinks they seem similar to the BCC she had on the jawline recently. Not tender, not bleeding. She uses Caress body wash in the shower.     No other concerns addressed today.    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  2-10-15     Basal cell carcinoma of left frontal scalp (hairline) s/p mohs 2-10-15     Elevated blood pressure reading without diagnosis of hypertension     Past Medical History:   Diagnosis Date     Allergy, unspecified not elsewhere classified 2003    s/p desensitization     Anxiety      Atrial tachycardia (H)     ablation 3/24/11     Bartholin's abscess 7/09     Basal cell carcinoma of anterior chest s/p excision 11-4-14 11/4/2014     Bradycardia      Excessive or frequent menstruation 2001    s/p TVH     Transient disorder of initiating or maintaining sleep 2005     Past Surgical History:   Procedure Laterality Date     ABDOMEN SURGERY  2001    Hysterectomy     ARTHROPLASTY TOE(S)  12/2/2013    Procedure: ARTHROPLASTY TOE(S);;  Surgeon: Vinayak Palacios DPM;  Location: [facility] OR     BIOPSY  2014, 2016, 2017    skin cancer biopsies, multiple     BUNIONECTOMY  12/2/2013    Procedure: BUNIONECTOMY;  Bunionectomy, resection of arthroplasty of PIP joint right foot;  Surgeon: Vinayak Palacios DPM;  Location: [facility] OR     C LIGATE FALLOPIAN TUBE       CARDIAC SURGERY  ablation for abnormal rhythym    2013     COSMETIC SURGERY  abdomiplasty, lipo    6/2016, 4/2017     EYE SURGERY  lasik    11/2007     HC REVISE MEDIAN N/CARPAL TUNNEL SURG      bilateral carpal tunnel repair     HYSTERECTOMY, PAP NO LONGER INDICATED  2001     HYSTERECTOMY, VAGINAL  1/30/01    TVH for menorraghia     REPAIR HAMMER TOE  12/19/2011    Procedure:REPAIR HAMMER TOE; Arthroplasty PIPJ 5th toe, left; Surgeon:VINAYAK PALACIOS; Location:[facility] OR     SURGICAL HISTORY OF -   8/09    Bilat LE Laser ablation, varicose veins     SURGICAL HISTORY OF -   3/24/11    atrial ablation     Social History:  The patient works as a .  Patient has a history of tanning bed usage.     Family History:  There is a family history of melanoma in the patient's mother.    Medications:  Current Outpatient Prescriptions   Medication Sig Dispense Refill      fluocinonide (LIDEX) 0.05 % solution Apply daily for up to 1 week for flares (Patient not taking: Reported on 8/27/2018) 60 mL 1     Allergies   Allergen Reactions     No Known Drug Allergies      Review of Systems:  -Skin: As above in HPI. No additional skin concerns.  -Const: The patient is generally feeling well today.     Physical exam:  GEN: This is a well developed, well-nourished female in no acute distress, in a pleasant mood.    SKIN: Waist-up skin, which includes the head/face, neck, both arms, chest, back, abdomen, digits and/or nails was examined.  - Orellana Type II  - Scattered brown macules on sun exposed areas.  - skin colored papule on central mid back that dimples with alteral pressure, EIC vs dermatofibroma  - well healed scar at left jaw line and in other areas of past NMSC  - There are waxy stuck on tan to brown papules on the face.  - There are erythematous macules with overyling adherent scale on the left cheek x 2.   - Multiple regular brown pigmented macules and papules are identified on the neck.   - pink follicular based papules x 4 on the left lateral chest  - No other lesions of concern on areas examined.     Impression/Plan:  1. Family history of melanoma    Last total skin exam 8/10/2018, will repeat in 3 months    2. History of nonmelanoma skin cancer- No evidence of recurrence    Recommend sunscreens SPF #30 or greater, protective clothing and avoidance of tanning beds.    3. Sun damaged skin with solar lentigines    Recommend sunscreens SPF #30 or greater, protective clothing and avoidance of tanning beds.    4. Benign findings including: seborrheic keratoses    No further intervention required. Patient to report changes.     Patient reassured of the benign nature of these lesions.    5. Skin colored papule on central mid back that dimples with lateral pressure, EIC vs dermatofibroma    Patient reassured of the benign nature of these lesions.    No further intervention required.  Patient to report changes.     6. Actinic keratosis. Discussed precancerous nature of these lesions. Recommended treatment to prevent progression to a squamous cell carcinoma.     Cryotherapy procedure note: After verbal consent and discussion of risks and benefits including but no limited to dyspigmentation/scar, blister, and pain, 2 were treated with 1-2mm freeze border for 1 cycle with liquid nitrogen. Post cryotherapy instructions were provided.    7. Folliculitis    Recommended using a BPO as a body wash in the shower for prevention.     8. Multiple benign nevi     No further intervention required at this time.    Patient reassured of the benign nature of these lesions.      Follow up in 3 months for next total body skin check.    Staff Involved:  Scribe/Staff    Scribe Disclosure  I, Moraima Maxwell, am serving as a scribe to document services personally performed by Dr. Danae Ortiz MD, based on data collection and the provider's statements to me.     Provider Disclosure:   The documentation recorded by the scribe accurately reflects the services I personally performed and the decisions made by me.    Danae Ortiz MD    Department of Dermatology  [facility]: Phone: [phone], Fax:[phone]  [facility]: Phone: [phone], Fax: [phone]

## 2018-11-19 NOTE — PATIENT INSTRUCTIONS
Start over-the-counter benzoyl peroxide 10% wash on the body as a body wash.  If 10% is too irritating you can use the 5%. ([employer] makes this product. It is available here at the pharmacy or at [employer]). This medication can bleach your towels and clothing.     It is found in a purple tube in the acne aisle.             Cryotherapy    What is it?    Use of a very cold liquid, such as liquid nitrogen, to freeze and destroy abnormal skin cells that need to be removed    What should I expect?    Tenderness and redness    A small blister that might grow and fill with dark purple blood. There may be crusting.    More than one treatment may be needed if the lesions do not go away.    How do I care for the treated area?    Gently wash the area with your hands when bathing.    Use a thin layer of Vaseline to help with healing. You may use a Band-Aid.     The area should heal within 7-10 days and may leave behind a pink or lighter color.     Do not use an antibiotic or Neosporin ointment.     You may take acetaminophen (Tylenol) for pain.     Call your Doctor if you have:    Severe pain    Signs of infection (warmth, redness, cloudy yellow drainage, and or a bad smell)    Questions or concerns    Who should I call with questions?       [facility]: [phone]       [facility]: [phone]       For urgent needs outside of business hours call the [facility] [[email or web address]] at [phone]        and ask for the dermatology resident on call

## 2018-11-30 ENCOUNTER — TRANSFERRED RECORDS (OUTPATIENT)
Dept: HEALTH INFORMATION MANAGEMENT | Facility: CLINIC | Age: 53
End: 2018-11-30

## 2018-12-10 ENCOUNTER — TRANSFERRED RECORDS (OUTPATIENT)
Dept: HEALTH INFORMATION MANAGEMENT | Facility: CLINIC | Age: 53
End: 2018-12-10

## 2019-01-24 ENCOUNTER — TRANSFERRED RECORDS (OUTPATIENT)
Dept: HEALTH INFORMATION MANAGEMENT | Facility: CLINIC | Age: 54
End: 2019-01-24

## 2019-02-04 ENCOUNTER — OFFICE VISIT (OUTPATIENT)
Dept: FAMILY MEDICINE | Facility: CLINIC | Age: 54
End: 2019-02-04
Payer: COMMERCIAL

## 2019-02-04 ENCOUNTER — ANCILLARY PROCEDURE (OUTPATIENT)
Dept: GENERAL RADIOLOGY | Facility: CLINIC | Age: 54
End: 2019-02-04
Payer: COMMERCIAL

## 2019-02-04 VITALS
HEIGHT: 66 IN | BODY MASS INDEX: 28.45 KG/M2 | OXYGEN SATURATION: 98 % | SYSTOLIC BLOOD PRESSURE: 112 MMHG | DIASTOLIC BLOOD PRESSURE: 70 MMHG | TEMPERATURE: 97.7 F | WEIGHT: 177 LBS | RESPIRATION RATE: 14 BRPM | HEART RATE: 59 BPM

## 2019-02-04 DIAGNOSIS — M25.531 RIGHT WRIST PAIN: Primary | ICD-10-CM

## 2019-02-04 DIAGNOSIS — Z11.4 SCREENING FOR HIV (HUMAN IMMUNODEFICIENCY VIRUS): ICD-10-CM

## 2019-02-04 DIAGNOSIS — Z12.11 SCREEN FOR COLON CANCER: ICD-10-CM

## 2019-02-04 DIAGNOSIS — Z13.1 SCREENING FOR DIABETES MELLITUS: ICD-10-CM

## 2019-02-04 DIAGNOSIS — Z23 NEED FOR TDAP VACCINATION: ICD-10-CM

## 2019-02-04 DIAGNOSIS — Z23 NEED FOR PROPHYLACTIC VACCINATION AND INOCULATION AGAINST INFLUENZA: ICD-10-CM

## 2019-02-04 DIAGNOSIS — Z12.11 SCREENING FOR COLON CANCER: ICD-10-CM

## 2019-02-04 DIAGNOSIS — Z13.29 SCREENING FOR THYROID DISORDER: ICD-10-CM

## 2019-02-04 DIAGNOSIS — Z13.220 LIPID SCREENING: ICD-10-CM

## 2019-02-04 DIAGNOSIS — M25.531 RIGHT WRIST PAIN: ICD-10-CM

## 2019-02-04 PROCEDURE — 90715 TDAP VACCINE 7 YRS/> IM: CPT | Performed by: NURSE PRACTITIONER

## 2019-02-04 PROCEDURE — 73110 X-RAY EXAM OF WRIST: CPT | Mod: RT

## 2019-02-04 PROCEDURE — 99214 OFFICE O/P EST MOD 30 MIN: CPT | Mod: 25 | Performed by: NURSE PRACTITIONER

## 2019-02-04 PROCEDURE — 90471 IMMUNIZATION ADMIN: CPT | Performed by: NURSE PRACTITIONER

## 2019-02-04 SDOH — HEALTH STABILITY: MENTAL HEALTH: HOW OFTEN DO YOU HAVE A DRINK CONTAINING ALCOHOL?: NEVER

## 2019-02-04 ASSESSMENT — PAIN SCALES - GENERAL: PAINLEVEL: SEVERE PAIN (6)

## 2019-02-04 ASSESSMENT — MIFFLIN-ST. JEOR: SCORE: 1429.62

## 2019-02-04 NOTE — PROGRESS NOTES
SUBJECTIVE:   Oumou Carolina is a 53 year old female who presents to clinic today for the following health issues:    History of Present Illness     Diet:  Regular (no restrictions)  Frequency of exercise:  6-7 days/week  Duration of exercise:  45-60 minutes  Taking medications regularly:  Yes  Medication side effects:  Not applicable  Additional concerns today:  No    Right Wrist Pain    Onset: 5 days     Description: Patient ran into her door frame while walking to the bathroom Thursday night at 1:30 AM.  Location: right wrist  Character: Dull ache    Intensity: 6/10    Progression of Symptoms: worse    Accompanying Signs & Symptoms:  Other symptoms: swelling, Pain while pressure applied  no numbness, no tinglining     History: Previous similar pain: YES, carpal tunnel release on right wrist 20 years ago     Precipitating factors: Trauma or overuse: YES- trauma    Alleviating factors: Improved by: icing     Therapies Tried and outcome: icing     Problem list and histories reviewed & adjusted, as indicated.  Additional history: as documented        Patient Active Problem List   Diagnosis     Persistent disorder of initiating or maintaining sleep     Hyperlipidemia LDL goal <160     Sinus bradycardia     Hammer toe     Hx of hysterectomy     Basal cell carcinoma of anterior chest s/p excision 11-4-14     Squamous cell carcinoma in situ left chest s/p excision 2-10-15     Basal cell carcinoma of left frontal scalp (hairline) s/p mohs 2-10-15     Elevated blood pressure reading without diagnosis of hypertension     Past Surgical History:   Procedure Laterality Date     ABDOMEN SURGERY  2001    Hysterectomy     ARTHROPLASTY TOE(S)  12/2/2013    Procedure: ARTHROPLASTY TOE(S);;  Surgeon: Ab Palacios DPM;  Location: [facility] OR     BIOPSY  2014, 2016, 2017    skin cancer biopsies, multiple     BUNIONECTOMY  12/2/2013    Procedure: BUNIONECTOMY;  Bunionectomy, resection of arthroplasty of PIP joint right foot;   Surgeon: Vinayak Palacios, DPM;  Location: [facility] OR     C LIGATE FALLOPIAN TUBE       CARDIAC SURGERY  ablation for abnormal rhythym    2013     COSMETIC SURGERY  abdomiplasty, lipo    6/2016, 4/2017     EYE SURGERY  lasik    11/2007     HC REVISE MEDIAN N/CARPAL TUNNEL SURG      bilateral carpal tunnel repair     HYSTERECTOMY, PAP NO LONGER INDICATED  2001     HYSTERECTOMY, VAGINAL  1/30/01    TVH for menorraghia     REPAIR HAMMER TOE  12/19/2011    Procedure:REPAIR HAMMER TOE; Arthroplasty PIPJ 5th toe, left; Surgeon:VINAYAK PALACIOS; Location:[facility] OR     SURGICAL HISTORY OF -   8/09    Bilat LE Laser ablation, varicose veins     SURGICAL HISTORY OF -   3/24/11    atrial ablation       Social History     Tobacco Use     Smoking status: Never Smoker     Smokeless tobacco: Never Used   Substance Use Topics     Alcohol use: No     Frequency: Never     Comment: none at all      Family History   Problem Relation Age of Onset     Obesity Father      Cancer Mother         Skin cancer on lip     Other Cancer Mother         melanoma, basal cell carcinoma, squamous cell skin     Hypertension No family hx of      Coronary Artery Disease No family hx of      Diabetes No family hx of      Hyperlipidemia No family hx of      Breast Cancer No family hx of      Cancer - colorectal No family hx of      Depression/Anxiety No family hx of      Cerebrovascular Disease No family hx of      Thyroid Disease No family hx of      Asthma No family hx of      Chemical Addiction No family hx of      Known Genetic Syndrome No family hx of      Osteoporosis No family hx of      Anesthesia Reaction No family hx of      Prostate Cancer No family hx of      Ovarian Cancer No family hx of          No current outpatient medications on file.     Allergies   Allergen Reactions     No Known Drug Allergies      Recent Labs   Lab Test 09/28/17  0833 11/28/16  1145 12/03/14  0736 04/04/13  0812   LDL 86  --  91 109   HDL 92  --  95 77   TRIG 61  --  54 59  "  ALT  --   --   --  35   CR 0.67 0.72  --  0.79   GFRESTIMATED >90 85  --  78   GFRESTBLACK >90 >90  African American GFR Calc    --  >90   POTASSIUM 4.1 3.8  --  3.8   TSH  --   --  1.23 1.21      BP Readings from Last 3 Encounters:   02/04/19 112/70   08/27/18 121/73   01/22/18 146/77    Wt Readings from Last 3 Encounters:   02/04/19 80.3 kg (177 lb)   01/22/18 79.4 kg (175 lb)   09/28/17 78.5 kg (173 lb)        ROS:  Constitutional, HEENT, cardiovascular, pulmonary, GI, , musculoskeletal, neuro, skin, endocrine and psych systems are negative, except as otherwise noted.    This document serves as a record of the services and decisions personally performed and made by Florinda Eaton CNP. It was created on his/her behalf by Rosemarie Rolon, trained medical scribe. The creation of this document is based the provider's statements to the medical scribes.    Scribjaimie Rolon 10:59 AM, February 4, 2019  OBJECTIVE:     /70   Pulse 59   Temp 97.7  F (36.5  C) (Oral)   Resp 14   Ht 1.676 m (5' 6\")   Wt 80.3 kg (177 lb)   LMP  (LMP Unknown)   SpO2 98%   Breastfeeding? No   BMI 28.57 kg/m    Body mass index is 28.57 kg/m .     GENERAL: healthy, alert and no distress  MS: tender at distal ulna and carpal region. good ROM, normal strength, 2 small abrasions on prominence of distal ulna healing well, mild swelling noted here as well.   SKIN: 2 small abrasions as above on right lateral wrist, no suspicious lesions or rashes  NEURO: Normal strength and tone, mentation intact and speech normal  PSYCH: mentation appears normal, affect normal/bright    Diagnostic Test Results:  No results found for this or any previous visit (from the past 24 hour(s)).    ASSESSMENT/PLAN:       ICD-10-CM    1. Right wrist pain M25.531 XR Wrist Right G/E 3 Views   2. Screen for colon cancer Z12.11    3. Screening for HIV (human immunodeficiency virus) Z11.4 HIV Screening   4. Need for prophylactic vaccination and inoculation against " influenza Z23    5. Lipid screening Z13.220 Lipid panel reflex to direct LDL Fasting   6. Screening for diabetes mellitus Z13.1 Glucose   7. Screening for thyroid disorder Z13.29 TSH with free T4 reflex   8. Screening for colon cancer Z12.11 GASTROENTEROLOGY ADULT REF PROCEDURE ONLY [facility] [phone]; No Provider Preference     Reviewed symptoms and etiology patient presents with today. Right wrist Xray completed today. Right wrist brace provided to patient. PCP will follow up upon images of X-ray resulting as there were technical issues. Discussed ROM, healing and goals for removable splint.     Future screening labs and one time HIV screening lab placed today for patient to complete with next lab draw.     Gastroenterology referral for a colonoscopy provided for patient to schedule.    Follow up with PCP within the next 1 month for annual physical exam- me or GYN,  with fasting lab work or prn.       The information in this document, created by the medical scribe for me, accurately reflects the services I personally performed and the decisions made by me. I have reviewed and approved this document for accuracy prior to leaving the patient care area.  Florinda Eaton CNP  10:59 AM, 02/04/19    TORIBIO Steen CNP  [facility]

## 2019-02-15 ENCOUNTER — TELEPHONE (OUTPATIENT)
Dept: DERMATOLOGY | Facility: CLINIC | Age: 54
End: 2019-02-15

## 2019-02-15 ENCOUNTER — DOCUMENTATION ONLY (OUTPATIENT)
Dept: DERMATOLOGY | Facility: CLINIC | Age: 54
End: 2019-02-15

## 2019-02-15 ENCOUNTER — OFFICE VISIT (OUTPATIENT)
Dept: DERMATOLOGY | Facility: CLINIC | Age: 54
End: 2019-02-15
Payer: COMMERCIAL

## 2019-02-15 DIAGNOSIS — D22.9 MULTIPLE BENIGN NEVI: ICD-10-CM

## 2019-02-15 DIAGNOSIS — Z85.828 HISTORY OF NONMELANOMA SKIN CANCER: Primary | ICD-10-CM

## 2019-02-15 DIAGNOSIS — L57.0 ACTINIC KERATOSIS: ICD-10-CM

## 2019-02-15 PROCEDURE — 99213 OFFICE O/P EST LOW 20 MIN: CPT | Performed by: DERMATOLOGY

## 2019-02-15 ASSESSMENT — PAIN SCALES - GENERAL: PAINLEVEL: NO PAIN (0)

## 2019-02-15 NOTE — PROGRESS NOTES
[facility] Dermatology Note      Dermatology Problem List:  1. Family history of melanoma (mother)  2. NMSC  -BCC left jaw line, s/p excision 8/27/2018  -BCC, central abdomen, s/p excision 11/25/2015  -BCC, nodular, left frontal scalp, s/p Mohs 2/10/15  -SCCIS, left chest, s/p excision 2/10/15  -BCC, nodular with fibrosis, right chest, s/p excision 11/4/14   3. Actinic Keratosis  -Pigmented AK, left chest, s/p biopsy 8/10/2018  -Previous Tx: Efudex 3/2015, cryotherapy  4. Seborrheic dermatitis  -lidex 8/2018  5. Folliculitis, central chest, s/p biopsy 8/10/2018  6. EIC vs dermatofibroma, mid central back    Encounter Date: Feb 15, 2019    CC:  Chief Complaint   Patient presents with     RECHECK     Oumou is returning a 6 month recheck; two areas of concern today.         History of Present Illness:  Ms. Oumou Carolina is a 53 year old female who presents for a few loesions of concern in the setting of history of NMSC. She was last seen 11/19/18 with Dr. Ortiz. Today, the patient has two areas of concern. First, there are a number of lesions on her right arm that the patient thinks are a weird color. Next, there is a dark spot on her left shoulder which is new. She also points out a nodule on her back to examine today - denies pain, pruritus from this spot. No changes in medical problems. No fevers, chills, night sweats. No other specific concerns.     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 2-10-15     Basal cell carcinoma of left frontal scalp (hairline) s/p mohs 2-10-15     Elevated blood pressure reading without diagnosis of hypertension     Past Medical History:   Diagnosis Date      Allergy, unspecified not elsewhere classified 2003    s/p desensitization     Anxiety      Atrial tachycardia (H)     ablation 3/24/11     Bartholin's abscess 7/09     Basal cell carcinoma of anterior chest s/p excision 11-4-14 11/4/2014     Bradycardia      Excessive or frequent menstruation 2001    s/p TVH     Transient disorder of initiating or maintaining sleep 2005     Past Surgical History:   Procedure Laterality Date     ABDOMEN SURGERY  2001    Hysterectomy     ARTHROPLASTY TOE(S)  12/2/2013    Procedure: ARTHROPLASTY TOE(S);;  Surgeon: Vinayak Palacios DPM;  Location: [facility] OR     BIOPSY  2014, 2016, 2017    skin cancer biopsies, multiple     BUNIONECTOMY  12/2/2013    Procedure: BUNIONECTOMY;  Bunionectomy, resection of arthroplasty of PIP joint right foot;  Surgeon: Vinayak Palacios DPM;  Location: [facility] OR     C LIGATE FALLOPIAN TUBE       CARDIAC SURGERY  ablation for abnormal rhythym    2013     COSMETIC SURGERY  abdomiplasty, lipo    6/2016, 4/2017     EYE SURGERY  lasik    11/2007     HC REVISE MEDIAN N/CARPAL TUNNEL SURG      bilateral carpal tunnel repair     HYSTERECTOMY, PAP NO LONGER INDICATED  2001     HYSTERECTOMY, VAGINAL  1/30/01    TVH for menorraghia     REPAIR HAMMER TOE  12/19/2011    Procedure:REPAIR HAMMER TOE; Arthroplasty PIPJ 5th toe, left; Surgeon:VINAYAK PALACIOS; Location:[facility] OR     SURGICAL HISTORY OF -   8/09    Bilat LE Laser ablation, varicose veins     SURGICAL HISTORY OF -   3/24/11    atrial ablation     Social History:  The patient works as a .       Family History:  There is a family history of melanoma in the patient's mother. reviewed    Medications:  No current outpatient medications on file.     Allergies   Allergen Reactions     No Known Drug Allergies      Review of Systems:  -Skin: As above in HPI. No additional skin concerns.   -Const: The patient is generally feeling well today. No fevers, chills, night sweats.     Physical exam:  GEN: This is a well  developed, well-nourished female in no acute distress, in a pleasant mood.    SKIN: Total skin excluding the undergarment areas was performed. The exam included the head/face, neck, both arms, chest, back, abdomen, both legs, digits and/or nails. Exam included the external buttocks. Nails painted.   Subcutaneous pea sized nodule with central pore on the mid back.   - Brown macule on the left anterior shoulder  - Two brown macules on the right upper arm and right arm.   - There is no erythema, telangectasias, nodularity, or pigmentation on the sites of prior skin cancer.  - There are erythematous macules with overyling adherent scale on the forehead.   - No other lesions of concern on areas examined.     Impression/Plan:  1. Family history of melanoma    2. History of nonmelanoma skin cancer- No evidence of recurrence    Recommend sunscreens SPF #30 or greater, protective clothing and avoidance of tanning beds.    3. Solar lentigines, including lesion of concern on the left anterior shoulder.     No further intervention needed.     4. Multiple benign nevi, including lesions of concern on the right arm.     No further intervention needed.     5. Actinic keratoses-forehead    Discussed risks and benefits of PDT including but not limited to discomfort and pain with procedure, time for procedure, need for avoidance of sun exposure after treatment, expected irritation after treatment, risk of exuberant reaction. Consent obtained in clinic today.  No history of shingles or HSV in treatment area.     No indication or Valtrex and she declines valtrex.     6. Subcutaneous pea sized nodule with central pore on the mid back. Consistent with cyst.     Pt scheduled for removal. She would like removed.     Follow up in 6 months, earlier for PDT  Staff Involved:  Scribe/Staff    Scribe Disclosure  I, Malachi Oscar, am serving as a scribe to document services personally performed by Dr. Sonal Peters MD, based on data collection and  the provider's statements to me.     Provider Disclosure:   The documentation recorded by the scribe accurately reflects the services I personally performed and the decisions made by me.    Sonal Peters MD    Department of Dermatology  [facility]: Phone: [phone], Fax:[phone]  [facility]: Phone: [phone], Fax: [phone]

## 2019-02-15 NOTE — PATIENT INSTRUCTIONS
INSTRUCTIONS BEFORE PHOTODYNAMIC THERAPY TREATMENT    I will experience redness, swelling, pain and discomfort which will last 5-10 days. I may experience pinkness or redness that persists for 2-3 weeks but longer duration is possible in some individuals. Risks are infection, eye injury, blistering, reactivation of the cold sore virus, skin lightening, skin darkening or scarring. Multiple treatments may be required.  The skin of the area(s) to be treated will first be cleansed with acetone to remove the oils from the skin.  The Aminolevulanic Acid will then be applied.  Areas may be covered with saran wrap.  After approximately 1 hour, you will be brought in to sit under the blue light.  You will wear special goggles and will be placed under the light for about 16 minutes.  During the treatment you may feel stinging, tingling, prickling, burning, or itching.  It may be uncomfortable.  After your treatment you will wash the area gently with cool water and then apply sunscreen.  Day before treatment:  1) Men should shave their face the day prior if it is being treated  2) Take your Valtrex if your doctor prescribed it  Morning of the treatment:  1) Take your Valtrex if your doctor prescribed it  Please do not hesitate to call the clinic at any time with any questions or concerns.      Risks of the procedure:  After my photodynamic therapy, I will experience skin peeling, discomfort and mild swelling, which will last from 5-10 days.  Infection or reactivation of the cold sore virus can occur. Pinkness will persist for at least 2-3 weeks but longer duration is possible in some individuals. Hyperpigmentation or hypopigmentation is possible. Scarring is rare.      Who should I call with questions?    [facility]: [phone]     [facility]: [phone]    For urgent needs outside of business hours call the [facility] at [phone] and ask for the  dermatology resident on call

## 2019-02-15 NOTE — LETTER
2/15/2019         RE: Oumou Carolina  [address] [facility] [address]        Dear Colleague,    Thank you for referring your patient, Oumou Carloina, to the [facility]. Please see a copy of my visit note below.    [facility] Dermatology Note      Dermatology Problem List:  1. Family history of melanoma (mother)  2. NMSC  -BCC left jaw line, s/p excision 8/27/2018  -BCC, central abdomen, s/p excision 11/25/2015  -BCC, nodular, left frontal scalp, s/p Mohs 2/10/15  -SCCIS, left chest, s/p excision 2/10/15  -BCC, nodular with fibrosis, right chest, s/p excision 11/4/14   3. Actinic Keratosis  -Pigmented AK, left chest, s/p biopsy 8/10/2018  -Previous Tx: Efudex 3/2015, cryotherapy  4. Seborrheic dermatitis  -lidex 8/2018  5. Folliculitis, central chest, s/p biopsy 8/10/2018  6. EIC vs dermatofibroma, mid central back    Encounter Date: Feb 15, 2019    CC:  Chief Complaint   Patient presents with     RECHECK     Oumou is returning a 6 month recheck; two areas of concern today.         History of Present Illness:  Ms. Oumou Carolina is a 53 year old female who presents for a few loesions of concern in the setting of history of NMSC. She was last seen 11/19/18 with Dr. Ortiz. Today, the patient has two areas of concern. First, there are a number of lesions on her right arm that the patient thinks are a weird color. Next, there is a dark spot on her left shoulder which is new. She also points out a nodule on her back to examine today - denies pain, pruritus from this spot. No changes in medical problems. No fevers, chills, night sweats. No other specific concerns.     Past Medical History:   Patient Active Problem List   Diagnosis     Persistent disorder of initiating or maintaining sleep     Hyperlipidemia LDL goal <160     Sinus bradycardia     Hammer toe     Hx of hysterectomy     Basal cell carcinoma of anterior chest s/p excision 11-4-14     Squamous  cell carcinoma in situ left chest s/p excision 2-10-15     Basal cell carcinoma of left frontal scalp (hairline) s/p mohs 2-10-15     Elevated blood pressure reading without diagnosis of hypertension     Past Medical History:   Diagnosis Date     Allergy, unspecified not elsewhere classified 2003    s/p desensitization     Anxiety      Atrial tachycardia (H)     ablation 3/24/11     Bartholin's abscess 7/09     Basal cell carcinoma of anterior chest s/p excision 11-4-14 11/4/2014     Bradycardia      Excessive or frequent menstruation 2001    s/p TVH     Transient disorder of initiating or maintaining sleep 2005     Past Surgical History:   Procedure Laterality Date     ABDOMEN SURGERY  2001    Hysterectomy     ARTHROPLASTY TOE(S)  12/2/2013    Procedure: ARTHROPLASTY TOE(S);;  Surgeon: Vinayak Palacios DPM;  Location: [facility] OR     BIOPSY  2014, 2016, 2017    skin cancer biopsies, multiple     BUNIONECTOMY  12/2/2013    Procedure: BUNIONECTOMY;  Bunionectomy, resection of arthroplasty of PIP joint right foot;  Surgeon: Vinayak Palacios DPM;  Location:  OR     C LIGATE FALLOPIAN TUBE       CARDIAC SURGERY  ablation for abnormal rhythym    2013     COSMETIC SURGERY  abdomiplasty, lipo    6/2016, 4/2017     EYE SURGERY  lasik    11/2007     HC REVISE MEDIAN N/CARPAL TUNNEL SURG      bilateral carpal tunnel repair     HYSTERECTOMY, PAP NO LONGER INDICATED  2001     HYSTERECTOMY, VAGINAL  1/30/01    TVH for menorraghia     REPAIR HAMMER TOE  12/19/2011    Procedure:REPAIR HAMMER TOE; Arthroplasty PIPJ 5th toe, left; Surgeon:VINAYAK PALACIOS; Location:[facility] OR     SURGICAL HISTORY OF -   8/09    Bilat LE Laser ablation, varicose veins     SURGICAL HISTORY OF -   3/24/11    atrial ablation     Social History:  The patient works as a .       Family History:  There is a family history of melanoma in the patient's mother. reviewed    Medications:  No current outpatient medications on file.     Allergies    Allergen Reactions     No Known Drug Allergies      Review of Systems:  -Skin: As above in HPI. No additional skin concerns.   -Const: The patient is generally feeling well today. No fevers, chills, night sweats.     Physical exam:  GEN: This is a well developed, well-nourished female in no acute distress, in a pleasant mood.    SKIN: Total skin excluding the undergarment areas was performed. The exam included the head/face, neck, both arms, chest, back, abdomen, both legs, digits and/or nails. Exam included the external buttocks. Nails painted.   Subcutaneous pea sized nodule with central pore on the mid back.   - Brown macule on the left anterior shoulder  - Two brown macules on the right upper arm and right arm.   - There is no erythema, telangectasias, nodularity, or pigmentation on the sites of prior skin cancer.  - There are erythematous macules with overyling adherent scale on the forehead.   - No other lesions of concern on areas examined.     Impression/Plan:  1. Family history of melanoma    2. History of nonmelanoma skin cancer- No evidence of recurrence    Recommend sunscreens SPF #30 or greater, protective clothing and avoidance of tanning beds.    3. Solar lentigines, including lesion of concern on the left anterior shoulder.     No further intervention needed.     4. Multiple benign nevi, including lesions of concern on the right arm.     No further intervention needed.     5. Actinic keratoses-forehead    Discussed risks and benefits of PDT including but not limited to discomfort and pain with procedure, time for procedure, need for avoidance of sun exposure after treatment, expected irritation after treatment, risk of exuberant reaction. Consent obtained in clinic today.  No history of shingles or HSV in treatment area.     No indication or Valtrex and she declines valtrex.     6. Subcutaneous pea sized nodule with central pore on the mid back. Consistent with cyst.     Pt scheduled for removal.  She would like removed.     Follow up in 6 months, earlier for PDT  Staff Involved:  Scribe/Staff    Scribe Disclosure  I, Malachi Oscar, am serving as a scribe to document services personally performed by Dr. Sonal Peters MD, based on data collection and the provider's statements to me.     Provider Disclosure:   The documentation recorded by the scribe accurately reflects the services I personally performed and the decisions made by me.    Sonal Peters MD    Department of Dermatology  [facility]: Phone: [phone], Fax:[phone]  [facility]: Phone: [phone], Fax: [phone]              Again, thank you for allowing me to participate in the care of your patient.        Sincerely,        Sonal Peters MD

## 2019-02-15 NOTE — PROGRESS NOTES
Financial Counselor Review:    Procedure to be performed (include CPT code): Photodynamic Therapy - 52220    Diagnosis code (include ICD-10 code): Actinic keratosis [L57.0]     Medication order (include J code):Yes Levulan -     Medication dose and frequency: 1-2 Levulan Sticks    Cosmetic procedure/medication: No    Coverage and patient financial responsibility information: YES    Does patient need to be contacted by Financial Counselor: YES    Route response back to the dermatology patient care [place] - [id number]

## 2019-02-15 NOTE — TELEPHONE ENCOUNTER
[facility] Call Center    Phone Message    May a detailed message be left on voicemail: yes    Reason for Call: Other: Pt is calling requesting a call back, pt needs to reschedule her procedure with Dr. Hyde on 2/20. Please advise     Action Taken: Message routed to:  Adult Clinics: Dermatology p [phone]

## 2019-02-15 NOTE — NURSING NOTE
Oumou Carolina's goals for this visit include:   Chief Complaint   Patient presents with     RECHECK     Oumou is returning a 6 month recheck; two areas of concern today.       She requests these members of her care team be copied on today's visit information:     PCP: Florinda Eaton    Referring Provider:  No referring provider defined for this encounter.    LMP  (LMP Unknown)     Do you need any medication refills at today's visit? No  Dalia Silva LPN

## 2019-02-19 NOTE — PROGRESS NOTES
Submitted to [facility] provider portal 2/19/19    Confirm Notification/Prior Authorization    Thank you for your online Notification/Prior Authorization submission.    The notification/prior authorization case information was transmitted on 02/19/2019 at 8:35 AM CST. The notification/prior authorization reference number is [id number]. Please print this page for your records.    The reference number above acknowledges receipt of your notification or prior authorization request. Please write this number down and refer to it for future inquiries. Coverage and payment for an item or service is governed by the member's benefit plan document, and, if applicable, the provider's participation agreement with the Health Plan.

## 2019-02-21 ENCOUNTER — OFFICE VISIT (OUTPATIENT)
Dept: DERMATOLOGY | Facility: CLINIC | Age: 54
End: 2019-02-21
Payer: COMMERCIAL

## 2019-02-21 VITALS — SYSTOLIC BLOOD PRESSURE: 141 MMHG | DIASTOLIC BLOOD PRESSURE: 88 MMHG | OXYGEN SATURATION: 99 % | HEART RATE: 64 BPM

## 2019-02-21 DIAGNOSIS — L72.9 CYST OF SKIN: Primary | ICD-10-CM

## 2019-02-21 PROCEDURE — 88304 TISSUE EXAM BY PATHOLOGIST: CPT | Mod: TC | Performed by: DERMATOLOGY

## 2019-02-21 PROCEDURE — 12031 INTMD RPR S/A/T/EXT 2.5 CM/<: CPT | Performed by: DERMATOLOGY

## 2019-02-21 PROCEDURE — 11401 EXC TR-EXT B9+MARG 0.6-1 CM: CPT | Mod: 51 | Performed by: DERMATOLOGY

## 2019-02-21 ASSESSMENT — PAIN SCALES - GENERAL: PAINLEVEL: NO PAIN (0)

## 2019-02-21 NOTE — NURSING NOTE
mupirocin and pressure dressing applied to excision site on back.  Wound care instructions reviewed with patient and AVS provided.  Patient verbalized understanding.  Patient will follow up for suture removal on 3-7-19.  No further questions or concerns at this time.      Nicole Mims LPN

## 2019-02-21 NOTE — PROGRESS NOTES
[facility] Dermatologic Surgery Excision Note    Date of Surgery: 02/21/19  Surgery: Elliptical Excision with Intermediate Closure    Case(s) Specific Information:    Case 1  Surgery: Wide local excision with no margin   Repair Type: intermediate  Diagnosis: Epidermoid cyst  Location: back  Lesion Size: 1.0 x 0.7 cm  Excised Diameter: 1.0 x 0.7 cm  Level of Defect: fat    INDICATIONS:  The patient was scheduled for excision of a Epidermoid cyst of the back. Excision was indicated. We discussed the principles of treatment and most likely complications including scarring, bleeding, infection, incomplete excision, wound dehiscence, pain, nerve damage, and recurrence. Informed consent was obtained and the patient underwent the procedure as follows:    PROCEDURE:  The patient was placed in a prone position on the operating table. The lesion was measured and identified, prepped with Hibiclens outlined with a marker and infiltrated with Lidocaine 1% with epi 1:100,000 5.5 ml. The tumor was excised in and elliptical fashion with 0cm margins around the tumor and to the level of fat. The wound was widely undermined. Hemostasis was obtained with bipolar electrocoagulation. A plicating suture was placed in deep tissue or fascia to eliminate dead space and narrow the wound. Wound edges were apposed with buried vertical mattress vicryl 4-0 sutures.  Epidermal approximation was meticulously refined with simple running prolene 5-0 sutures, resulting in a linear closure with little to no wound tension. Blood loss was estimated to be less than 5cc. The wound was cleansed with sterile saline, and dressed with an ointment and non-adherent gauze.  The patient will return for wound evaluation as needed. Wound care instructions were reviewed verbally and in writing with the patient.      Specimen notched/Sutured: NA  Repair Size: 2.2 cm   Sutures Used:  Deep: vicryl 4-0 Superficial: prolene 5-0    Anesthesia Visit  Total (ml): 5.5    Staff involved:    Scribe Disclosure  I, Demetri Bradshaw, am serving as a scribe to document services personally performed by Dr. Indra Hyde DO, based on data collection and the provider's statements to me.     Provider Disclosure:   The documentation recorded by the scribe accurately reflects the services I personally performed and the decisions made by me.  I personally performed the procedures today.    Indra Hyde DO    Department of Dermatology  [facility]: Phone: [phone], Fax:[phone]  [facility]: Phone: [phone], Fax: [phone]

## 2019-02-21 NOTE — PATIENT INSTRUCTIONS

## 2019-02-21 NOTE — LETTER
2/21/2019         RE: Oumou Carolina  [address] [place] [id number]        Dear Colleague,    Thank you for referring your patient, Oumou Carolina, to the [facility]. Please see a copy of my visit note below.    [facility] Dermatologic Surgery Excision Note    Date of Surgery: 02/21/19  Surgery: Elliptical Excision with Intermediate Closure    Case(s) Specific Information:    Case 1  Surgery: Wide local excision with no margin   Repair Type: intermediate  Diagnosis: Epidermoid cyst  Location: back  Lesion Size: 1.0 x 0.7 cm  Excised Diameter: 1.0 x 0.7 cm  Level of Defect: fat    INDICATIONS:  The patient was scheduled for excision of a Epidermoid cyst of the back. Excision was indicated. We discussed the principles of treatment and most likely complications including scarring, bleeding, infection, incomplete excision, wound dehiscence, pain, nerve damage, and recurrence. Informed consent was obtained and the patient underwent the procedure as follows:    PROCEDURE:  The patient was placed in a prone position on the operating table. The lesion was measured and identified, prepped with Hibiclens outlined with a marker and infiltrated with Lidocaine 1% with epi 1:100,000 5.5 ml. The tumor was excised in and elliptical fashion with 0cm margins around the tumor and to the level of fat. The wound was widely undermined. Hemostasis was obtained with bipolar electrocoagulation. A plicating suture was placed in deep tissue or fascia to eliminate dead space and narrow the wound. Wound edges were apposed with buried vertical mattress vicryl 4-0 sutures.  Epidermal approximation was meticulously refined with simple running prolene 5-0 sutures, resulting in a linear closure with little to no wound tension. Blood loss was estimated to be less than 5cc. The wound was cleansed with sterile saline, and dressed with an ointment and non-adherent gauze.  The patient will return for  wound evaluation as needed. Wound care instructions were reviewed verbally and in writing with the patient.      Specimen notched/Sutured: NA  Repair Size: 2.2 cm   Sutures Used:  Deep: vicryl 4-0 Superficial: prolene 5-0    Anesthesia Visit Total (ml): 5.5    Staff involved:    Scribe Disclosure  I, Demetri Bradshaw, am serving as a scribe to document services personally performed by Dr. Indra Hyde DO, based on data collection and the provider's statements to me.     Provider Disclosure:   The documentation recorded by the scribe accurately reflects the services I personally performed and the decisions made by me.  I personally performed the procedures today.    Indra Hyde DO    Department of Dermatology  [facility]: Phone: [phone], Fax:[phone]  [facility]: Phone: [phone], Fax: [phone]    Again, thank you for allowing me to participate in the care of your patient.        Sincerely,        Indra Hyde MD

## 2019-02-21 NOTE — NURSING NOTE
Excision Intake                                                    /88 (BP Location: Left arm, Patient Position: Sitting, Cuff Size: Adult Regular)   Pulse 64   LMP  (LMP Unknown)   SpO2 99%     Do you take the following medications:  Coumadin, Eliquis, Pradaxa, Xarelto:   NO  -If on Coumadin, INR should be checked within 7 days of surgery.  Range is 3.5 or less or within therapeutic range.  Antibiotic Prophylaxis:  NO    Do you have an iodine allergy:  NO    Past Medical History                                                    Do you currently or have you previously had any of the following conditions:       HIV/AIDS:  NO    Hepatitis:  NO    Suppressed Immune System:  NO    Autoimmune disorder (eg RA, Lupus):  NO    Fever blisters/herpes, cold sores:  NO    Kidney disease:  NO  Creatinine   Date Value Ref Range Status   09/28/2017 0.67 0.52 - 1.04 mg/dL Final   ]    Pacemaker or Defibrillator:  NO.      History of artificial or heart valve replacement:  NO.      Endocarditis: NO    Organ transplant: NO.      Joint replacement within past 2 years: NO    Previous prosthetic joint infection: NO    Diabetes: NO    Pregnant:: NO    Tobacco use:  NO.    History   Smoking Status     Never Smoker   Smokeless Tobacco     Never Used     Reviewed by:  Nicole Mims LPN

## 2019-02-21 NOTE — NURSING NOTE
The following medication was given:     MEDICATION:  Lidocaine with epinephrine 1% 1:[id number]  ROUTE: SQ  SITE: back  DOSE: 3cc  LOT #:  [id number]  : Debora  EXPIRATION DATE: 1-feb-2020  NDC#: 9913-4625-98   Was there drug waste? No  Multi-dose vial: Yes    Nicole Mims LPN  February 21, 2019

## 2019-02-25 LAB — COPATH REPORT: NORMAL

## 2019-02-26 ENCOUNTER — TELEPHONE (OUTPATIENT)
Dept: DERMATOLOGY | Facility: CLINIC | Age: 54
End: 2019-02-26

## 2019-02-26 NOTE — TELEPHONE ENCOUNTER
Result Notes for Dermatological path order and indications     Notes recorded by Gay De La Garza LPN on 2/26/2019 at 4:53 PM CST  Writer called and spoke with patient with pathology results. Patient verbalized understanding had stated she had no further questions at this time.    Gay De La Garza LPN    ------    Notes recorded by Javier Solis MD on 2/26/2019 at 4:28 PM CST  Please call the patient with pathology results.  Excision was confirmed to be a benign cyst.   As long as the wound is healing well, no additional surgery is necessary.  Thank you.   Dermatological path order and indications   Order: [id number]   Status:  Final result   Visible to patient:  Yes (MyChart) Dx:  Cyst of skin   Component 5d ago   Copath Report Patient Name: ALENA AVENDAÑO   MR#: [id number]   Specimen #: [id number]   Collected: 2/21/2019   Received: 2/22/2019   Reported: 2/25/2019 23:28   Ordering Phy(s): JAVIER SOLIS     For improved result formatting, select 'View Enhanced Report Format' under    Linked Documents section.     SPECIMEN(S):   Ellipse, back               Gay De La Garza LPN

## 2019-03-01 NOTE — PROGRESS NOTES
Patient is approved for treatment she will be subject to her plan deductible and out of pocket.     lvm for patient regarding approval

## 2019-03-07 ENCOUNTER — ALLIED HEALTH/NURSE VISIT (OUTPATIENT)
Dept: NURSING | Facility: CLINIC | Age: 54
End: 2019-03-07
Payer: COMMERCIAL

## 2019-03-07 DIAGNOSIS — Z48.02 VISIT FOR SUTURE REMOVAL: Primary | ICD-10-CM

## 2019-03-07 PROCEDURE — 99207 ZZC NO CHARGE NURSE ONLY: CPT

## 2019-03-07 NOTE — NURSING NOTE
Oumou Carolina comes into clinic today at the request of Dr. Hyde Ordering Provider for suture removal.    Dermatology Suture Removal Record  S: Oumou presents to the clinic today for  removal of sutures.  The patient has had the sutures in place for 14 days.    There has been no history of infection or drainage.    O: sutures are seen located on the mid back.  The wound is healing well with no signs of infection.    A: Suture removal.    P:  All sutures were easily removed today. Vaseline and primapore dressing applied.  Routine wound care discussed.  The patient will follow up as needed.    This service provided today was under the supervising provider of the day Dr. Hyde, who was available if needed.    Paz Chaney RN

## 2019-03-22 ENCOUNTER — ALLIED HEALTH/NURSE VISIT (OUTPATIENT)
Dept: NURSING | Facility: CLINIC | Age: 54
End: 2019-03-22
Payer: COMMERCIAL

## 2019-03-22 DIAGNOSIS — L57.0 ACTINIC KERATOSIS: ICD-10-CM

## 2019-03-22 PROCEDURE — 96567 PDT DSTR PRMLG LES SKN: CPT

## 2019-03-22 ASSESSMENT — PAIN SCALES - GENERAL: PAINLEVEL: NO PAIN (0)

## 2019-03-22 NOTE — NURSING NOTE
Oumou Carolina's goals for this visit include:   Chief Complaint   Patient presents with     Allied Health Visit     Oumou is visiting for PDT treatment number 1       She requests these members of her care team be copied on today's visit information:     PCP: Florinda Eaton    Referring Provider:  No referring provider defined for this encounter.    LMP  (LMP Unknown)     Do you need any medication refills at today's visit? no

## 2019-03-22 NOTE — NURSING NOTE
Photodynamic Therapy Intake:    If patient has a hx of HSV or VZV(shingles in treatment area) they have been given prophylaxis:  No      1.Close monitoring for more significant reaction, and avoid saran wrap if YES to any of the following:  New medications since seen by physician? No (If yes, contact supervising physician)  NSAIDs, ibuprofen, aspirin, naproxen, piroxicam: No  Antiarrhythmics (amiodarone, quinidine): No  Hydrochlorothiazide:  No    2.Will hold PDT for YES to any of the following:  Pregnancy/breastfeeding/unknown pregnancy: No  Sun burn: No  Tetracyclines such as doxycycline: No  Ciprofloxacin: No   Methotrexate: No    3.Will hold LEVULAN for YES to any of the following:   Treatment today is for acne:No     The sites to be treated were verified and discussed with patient, sites verified were face.   Expected symptoms and/or complications of redness, peeling, stinging, and burning were reviewed.  Comfort measures including moisturizer, cool compresses, and sun protection measures were also reviewed with patient.  After review, patient verbalized understanding of procedure and consent form signed by patient(as per MD documentation) and patient agrees to proceed with treatment.     Applied 1 Levulan stick to the site/s.  Yes saran wrap applied to face    MEDICATION: Levulan  DOSE: 1.5 mL  ROUTE: Topical  : [employer]  LOCATION GIVEN: face  LOT NO: [id number]  EXP. DATE: 11/2  NDC: 56068-490-24    TREATMENT NUMBER(30 maximum treatments per site): 1      Photodynamic Therapy(PDT) Post Op Note    Patient successfully completed PDT treatment today. Patient tolerated treatment without complication.  Sites were cleansed with mild soap and water and SPF was applied after treatment.     Sun protection was reviewed with patient:  Hat provided to patient    After care instructions were reviewed with patient. AVS printed with clinic contact information and given to patient. Patient  verbalized understanding and had no further questions or concerns at this time. Patient left clinic in good condition.

## 2019-03-22 NOTE — PATIENT INSTRUCTIONS
Photodynamic Therapy (PDT) Home Care Instructions  I will experience redness, swelling, pain and discomfort which will last 5-10 days. I may experience pinkness or redness that persists for 2-3 weeks but longer duration is possible in some individuals. Risks are infection, eye injury, blistering, reactivation of the cold sore virus, skin lightening, skin darkening or scarring. Multiple treatments may be required.   DAY OF TREATMENT  1) Wash treated area with mild cleanser.  Redness of the treated skin is expected and can vary significantly from person to person and treatment to treatment.  2) Apply SPF 50 before leaving your home/office and while driving to and from work.  3) Remain indoors if possible and avoid direct as well as indirect sunlight.  If your head or face were treated, wear a broad brimmed hat wile going to and from your car.  If your hands/arms were treated, were long sleeves and gloves.  4) Ice packs every 1-2 hours may be helpful as well.  5) In the evening, cleanse treated area gently.  Your skin will feel dry; keep treated area moisturized with a moisturizer.  6) If you have a history of cold sores, take (1) Valtrex 500mg tablet before bedtime, which will be prescribed by your physician.  DAY TWO  1) If you have a history of cold sores, take (1) Valtrex 500 mg tablets in the morning and in the evening on days 2 and 3.  2) You may take a shower.  Men should NOT shave if their face was treated.  3) Cleanse treated area gently.  4) Apply SPF 50  5) Most discomfort usually subsides by Day 3.  6) You should avoid direct sunlight and try to remain indoors on Day 2.  The sensitivity to sunlight will significantly decrease after 48 hours.  7) You should soak the treated areas with as soft washcloth with cold water for 20 minutes every 4-6 hours.  Ice may be applied after the cold-water soaks, if this feels good.  The area should be patted dry and moisturized following the cold-water soaks.  8) Follow  evening routine as you did on Day 1    DAYS 3- 7   1) Try to avoid direct sunlight and minimize incidental exposure for one week.  NO BEACHES!  Continue using SPF 50.  This is very important in protecting your newly rejuvenated skin.  2) You may begin applying make-up once any crusting has healed.  The area may be slightly red for a few weeks.  3) The skin will feel dry and tightened.  Moisturize once to twice daily.  Please plan to be at your appointment for approximately 90 minutes.  Your treatments are scheduled as follows.    Who should I call with questions?    [facility]: [phone]     [facility]: [phone]    For urgent needs outside of business hours call the [facility] at [phone] and ask for the dermatology resident on call

## 2019-04-02 ENCOUNTER — TELEPHONE (OUTPATIENT)
Dept: DERMATOLOGY | Facility: CLINIC | Age: 54
End: 2019-04-02

## 2019-04-02 NOTE — TELEPHONE ENCOUNTER
I left a message for patient to call [facility].  Dr. Hyde informed me that patient is interested in Botox and/or filler.  Next session is on 4/5/19 at 2:45 with Dr. Peters.  Please have pt arrived 30 min prior for numbing if she would like to have filler.  Paz Chaney RN

## 2019-04-04 NOTE — TELEPHONE ENCOUNTER
Left message for patient to call [facility] in [place] back at [phone]    Nicole Mims LPN

## 2019-04-15 ENCOUNTER — TELEPHONE (OUTPATIENT)
Dept: FAMILY MEDICINE | Facility: CLINIC | Age: 54
End: 2019-04-15

## 2019-04-15 NOTE — TELEPHONE ENCOUNTER
Summary:    Patient is due/failing the following:   FIT, LDL and PHYSICAL    Action needed:   Patient needs office visit for Physical ., Patient needs fasting lab only appointment and complete a FIT test or schedule a colonoscopy     Type of outreach:    Phone, spoke to patient.  patient will call back to schedule     Questions for provider review:    Rosalva Baig       Chart routed to Care Team .        Panel Management Review      Patient has the following on her problem list:   Hypertension   Last three blood pressure readings:  BP Readings from Last 3 Encounters:   02/21/19 141/88   02/04/19 112/70   08/27/18 121/73     Blood pressure: FAILED    HTN Guidelines:  Age 18-59 BP range:  Less than 140/90  Age 60-85 with Diabetes:  Less than 140/90  Age 60-85 without Diabetes:  less than 150/90      Composite cancer screening  Chart review shows that this patient is due/due soon for the following Fecal Colorectal (FIT)

## 2019-04-19 ENCOUNTER — ALLIED HEALTH/NURSE VISIT (OUTPATIENT)
Dept: NURSING | Facility: CLINIC | Age: 54
End: 2019-04-19
Payer: COMMERCIAL

## 2019-04-19 DIAGNOSIS — L57.0 ACTINIC KERATOSIS: ICD-10-CM

## 2019-04-19 PROCEDURE — 96567 PDT DSTR PRMLG LES SKN: CPT

## 2019-04-19 ASSESSMENT — PAIN SCALES - GENERAL: PAINLEVEL: NO PAIN (0)

## 2019-04-19 NOTE — PATIENT INSTRUCTIONS
Photodynamic Therapy (PDT) Home Care Instructions  I will experience redness, swelling, pain and discomfort which will last 5-10 days. I may experience pinkness or redness that persists for 2-3 weeks but longer duration is possible in some individuals. Risks are infection, eye injury, blistering, reactivation of the cold sore virus, skin lightening, skin darkening or scarring. Multiple treatments may be required.   DAY OF TREATMENT  1) Wash treated area with mild cleanser.  Redness of the treated skin is expected and can vary significantly from person to person and treatment to treatment.  2) Apply SPF 50 before leaving your home/office and while driving to and from work.  3) Remain indoors if possible and avoid direct as well as indirect sunlight.  If your head or face were treated, wear a broad brimmed hat wile going to and from your car.  If your hands/arms were treated, were long sleeves and gloves.  4) Ice packs every 1-2 hours may be helpful as well.  5) In the evening, cleanse treated area gently.  Your skin will feel dry; keep treated area moisturized with a moisturizer.  6) If you have a history of cold sores, take (1) Valtrex 500mg tablet before bedtime, which will be prescribed by your physician.  DAY TWO  1) If you have a history of cold sores, take (1) Valtrex 500 mg tablets in the morning and in the evening on days 2 and 3.  2) You may take a shower.  Men should NOT shave if their face was treated.  3) Cleanse treated area gently.  4) Apply SPF 50  5) Most discomfort usually subsides by Day 3.  6) You should avoid direct sunlight and try to remain indoors on Day 2.  The sensitivity to sunlight will significantly decrease after 48 hours.  7) You should soak the treated areas with as soft washcloth with cold water for 20 minutes every 4-6 hours.  Ice may be applied after the cold-water soaks, if this feels good.  The area should be patted dry and moisturized following the cold-water soaks.  8) Follow  evening routine as you did on Day 1    DAYS 3- 7   1) Try to avoid direct sunlight and minimize incidental exposure for one week.  NO BEACHES!  Continue using SPF 50.  This is very important in protecting your newly rejuvenated skin.  2) You may begin applying make-up once any crusting has healed.  The area may be slightly red for a few weeks.  3) The skin will feel dry and tightened.  Moisturize once to twice daily.  Who should I call with questions?    [facility]: [phone]     [facility]: [phone]    For urgent needs outside of business hours call the [facility] at [phone] and ask for the dermatology resident on call

## 2019-04-19 NOTE — NURSING NOTE
Oumou Carolina comes into clinic today at the request of Dr. Peters Ordering Provider for PDT.    This service provided today was under the supervising provider of the day Dr. Petesr, who was available if needed.        Photodynamic Therapy Intake:    If patient has a hx of HSV or VZV(shingles in treatment area) they have been given prophylaxis:  N/A      1.Close monitoring for more significant reaction, and avoid saran wrap if YES to any of the following:  New medications since seen by physician? No (If yes, contact supervising physician)  NSAIDs, ibuprofen, aspirin, naproxen, piroxicam: No  Antiarrhythmics (amiodarone, quinidine): No  Hydrochlorothiazide:  No    2.Will hold PDT for YES to any of the following:  Pregnancy/breastfeeding/unknown pregnancy: No  Sun burn: No  Tetracyclines such as doxycycline: No  Ciprofloxacin: No   Methotrexate: No    3.Will hold LEVULAN for YES to any of the following:   Treatment today is for acne:No     The sites to be treated were verified and discussed with patient, sites verified were face.   Expected symptoms and/or complications of redness, peeling, stinging, and burning were reviewed.  Comfort measures including moisturizer, cool compresses, and sun protection measures were also reviewed with patient.  After review, patient verbalized understanding of procedure and consent form signed by patient(as per MD documentation) and patient agrees to proceed with treatment.     Face was washed with [employer] and then de-greased with Acetone and sterile 4x4.  Applied 1 Levulan stick to the face followed by saran wrap.    MEDICATION: Levulan  DOSE: 1.5 mL  ROUTE: Topical  : [employer]  LOCATION GIVEN: face  LOT NO: [id number]  EXP. DATE: 11/20  NDC: 49332-251-22    TREATMENT NUMBER(30 maximum treatments per site): 2      Photodynamic Therapy(PDT) Post Op Note    Patient successfully completed PDT treatment today. Patient tolerated treatment without  complication.  Sites were cleansed with mild soap and water and SPF was applied after treatment.     Sun protection was reviewed with patient:  Patient brought personal hat    After care instructions were reviewed with patient. AVS printed with clinic contact information and given to patient. Patient verbalized understanding and had no further questions or concerns at this time. Patient left clinic in good condition.    Paz Chaney RN

## 2019-04-29 ENCOUNTER — TELEPHONE (OUTPATIENT)
Dept: SURGERY | Facility: CLINIC | Age: 54
End: 2019-04-29

## 2019-04-29 NOTE — TELEPHONE ENCOUNTER
PREVISIT INFORMATION                                                    Oumou Carolina scheduled for future visit at [facility] specialty clinics.    Patient is scheduled to see Dr. Bhakta on 5/3/19  Reason for visit: Eyelid surgery  Referring provider [facility]  Has patient seen previous specialist? No  Medical Records:  Available in chart.  Patient was previously seen at a [facility] or [facility] facility.    REVIEW                                                      New patient packet mailed to patient: No  Medication reconciliation complete: No      No current outpatient medications on file.       Allergies: No known drug allergies        PLAN/FOLLOW-UP NEEDED                                                      Previsit review complete.  Patient will see provider at future scheduled appointment.     Writer called and left message with appointment reminder.    Patient Reminders Given:  Please, make sure you bring an updated list of your medications.   If you are having a procedure, please, present 15 minutes early.  If you need to cancel or reschedule,please call [phone].    Gay De La Garza LPN

## 2019-04-30 ENCOUNTER — TELEPHONE (OUTPATIENT)
Dept: DERMATOLOGY | Facility: CLINIC | Age: 54
End: 2019-04-30

## 2019-04-30 NOTE — TELEPHONE ENCOUNTER
I left a message for patient to call [facility].  Is patient available the afternoon of June 5th for resident session (botox/filler)?  Schedule 30 min prior for numbing.  Paz Chaney RN

## 2019-05-07 NOTE — TELEPHONE ENCOUNTER
I left a message for patient to call [facility].  Paz Chaney RN

## 2019-05-09 NOTE — TELEPHONE ENCOUNTER
Left message for patient to call [facility] in [place] back at [phone]      Nicole Mims LPN     Age appropriate

## 2019-05-16 ENCOUNTER — TELEPHONE (OUTPATIENT)
Dept: FAMILY MEDICINE | Facility: CLINIC | Age: 54
End: 2019-05-16

## 2019-05-16 NOTE — TELEPHONE ENCOUNTER
Left message for pt. Please help pt schedule an OV for physical with fasting  lab.   Panel Management Review      Patient has the following on her problem list: None      Composite cancer screening  Chart review shows that this patient is due/due soon for the following None  Summary:    Patient is due/failing the following:   Glucose, tsh, LDL and PHYSICAL    Action needed:   Patient needs office visit for physical with fasting lab.    Type of outreach:    Phone, left message for patient to call back.     Questions for provider review:    None                                                                                                                                    Neri Manzo MA       Chart routed to Care Team .

## 2019-05-16 NOTE — LETTER
[facility]  [address]  [place] [facility] [place] [address]  Phone: [phone]  Fax: [phone]  August 2, 2019      Oumou Carolina  [address] [place] [address]      Dear Oumou,    We care about your health and have reviewed your health plan including your medical conditions, medications, and lab results.  Based on this review, it is recommended that you follow up regarding the following health topic(s):  -Wellness (Physical) Visit     We recommend you take the following action(s):  -schedule a WELLNESS (Physical) APPOINTMENT.  We will perform the following labs: Lipids (fasting cholesterol - nothing to eat except water and/or meds for 8-10 hours), TSH (thyroid test) and Glucose, HIV.     Please call us at the [facility] - [phone] (or use [employer]) to address the above recommendations.     Thank you for trusting [facility] and we appreciate the opportunity to serve you.  We look forward to supporting your healthcare needs in the future.    Healthy Regards,    Your Health Care Team  [facility]

## 2019-05-17 ENCOUNTER — ALLIED HEALTH/NURSE VISIT (OUTPATIENT)
Dept: NURSING | Facility: CLINIC | Age: 54
End: 2019-05-17
Payer: COMMERCIAL

## 2019-05-17 DIAGNOSIS — L57.0 ACTINIC KERATOSIS: ICD-10-CM

## 2019-05-17 PROCEDURE — 96567 PDT DSTR PRMLG LES SKN: CPT | Performed by: FAMILY MEDICINE

## 2019-05-17 ASSESSMENT — PAIN SCALES - GENERAL: PAINLEVEL: NO PAIN (0)

## 2019-05-17 NOTE — PATIENT INSTRUCTIONS
Photodynamic Therapy (PDT) Home Care Instructions  I will experience redness, swelling, pain and discomfort which will last 5-10 days. I may experience pinkness or redness that persists for 2-3 weeks but longer duration is possible in some individuals. Risks are infection, eye injury, blistering, reactivation of the cold sore virus, skin lightening, skin darkening or scarring. Multiple treatments may be required.   DAY OF TREATMENT  1) Wash treated area with mild cleanser.  Redness of the treated skin is expected and can vary significantly from person to person and treatment to treatment.  2) Apply SPF 50 before leaving your home/office and while driving to and from work.  3) Remain indoors if possible and avoid direct as well as indirect sunlight.  If your head or face were treated, wear a broad brimmed hat wile going to and from your car.  If your hands/arms were treated, were long sleeves and gloves.  4) Ice packs every 1-2 hours may be helpful as well.  5) In the evening, cleanse treated area gently.  Your skin will feel dry; keep treated area moisturized with a moisturizer.  6) If you have a history of cold sores, take (1) Valtrex 500mg tablet before bedtime, which will be prescribed by your physician.  DAY TWO  1) If you have a history of cold sores, take (1) Valtrex 500 mg tablets in the morning and in the evening on days 2 and 3.  2) You may take a shower.  Men should NOT shave if their face was treated.  3) Cleanse treated area gently.  4) Apply SPF 50  5) Most discomfort usually subsides by Day 3.  6) You should avoid direct sunlight and try to remain indoors on Day 2.  The sensitivity to sunlight will significantly decrease after 48 hours.  7) You should soak the treated areas with as soft washcloth with cold water for 20 minutes every 4-6 hours.  Ice may be applied after the cold-water soaks, if this feels good.  The area should be patted dry and moisturized following the cold-water soaks.  8) Follow  evening routine as you did on Day 1    DAYS 3- 7   1) Try to avoid direct sunlight and minimize incidental exposure for one week.  NO BEACHES!  Continue using SPF 50.  This is very important in protecting your newly rejuvenated skin.  2) You may begin applying make-up once any crusting has healed.  The area may be slightly red for a few weeks.  3) The skin will feel dry and tightened.  Moisturize once to twice daily.  Please plan to be at your appointment for approximately 90 minutes.  Your treatments are scheduled as follows.  Who should I call with questions?    [facility]: [phone]     [facility]: [phone]    For urgent needs outside of business hours call the [facility] at [phone] and ask for the dermatology resident on call

## 2019-05-17 NOTE — PROGRESS NOTES
Oumou Carolina comes into clinic today at the request of Dr. Peters Ordering Provider for PDT.    This service provided today was under the supervising provider of the day Dr. Orozco, who was available if needed.    Pam    Photodynamic Therapy Intake:    If patient has a hx of HSV or VZV(shingles in treatment area) they have been given prophylaxis:  N/A      1.Close monitoring for more significant reaction, and avoid saran wrap if YES to any of the following:  New medications since seen by physician? No (If yes, contact supervising physician)  NSAIDs, ibuprofen, aspirin, naproxen, piroxicam: No  Antiarrhythmics (amiodarone, quinidine): No  Hydrochlorothiazide:  No    2.Will hold PDT for YES to any of the following:  Pregnancy/breastfeeding/unknown pregnancy: No  Sun burn: No  Tetracyclines such as doxycycline: No  Ciprofloxacin: No   Methotrexate: No    3.Will hold LEVULAN for YES to any of the following:   Treatment today is for acne:No     The sites to be treated were verified and discussed with patient, sites verified were face.   Expected symptoms and/or complications of redness, peeling, stinging, and burning were reviewed.  Comfort measures including moisturizer, cool compresses, and sun protection measures were also reviewed with patient.  After review, patient verbalized understanding of procedure and consent form signed by patient(as per MD documentation) and patient agrees to proceed with treatment.     Face was washed with [employer] and then de-greased with Acetone and sterile 4x4.  Applied 1 Levulan stick to the face followed by saran wrap.    MEDICATION: Levulan  DOSE: 1.5 mL  ROUTE: Topical  : [employer]  LOCATION GIVEN: face  LOT NO: [id number]  EXP. DATE: 11/20  NDC: 67827-138-50    TREATMENT NUMBER(30 maximum treatments per site): 3      Photodynamic Therapy(PDT) Post Op Note    Patient successfully completed PDT treatment today. Patient tolerated treatment without  complication.  Sites were cleansed with mild soap and water and SPF was applied after treatment.     Sun protection was reviewed with patient:  Patient brought personal hat    After care instructions were reviewed with patient. AVS printed with clinic contact information and given to patient. Patient verbalized understanding and had no further questions or concerns at this time. Patient left clinic in good condition.    Paz Chaney RN

## 2019-06-05 ENCOUNTER — TRANSFERRED RECORDS (OUTPATIENT)
Dept: HEALTH INFORMATION MANAGEMENT | Facility: CLINIC | Age: 54
End: 2019-06-05

## 2019-08-02 NOTE — TELEPHONE ENCOUNTER
LM for patient to return phone call to clinic about message below.  Letter sent.  Farzaneh Harrington CMA ([facility])

## 2019-08-05 ENCOUNTER — TELEPHONE (OUTPATIENT)
Dept: FAMILY MEDICINE | Facility: CLINIC | Age: 54
End: 2019-08-05

## 2019-08-05 NOTE — LETTER
[facility]  [address]  [place] [address]  Phone: [phone]  Fax: [phone]  September 20, 2019      Oumou Carolina  [address] [place] [address]      Dear Oumou,    We care about your health and have reviewed your health plan including your medical conditions, medications, and lab results.  Based on this review, it is recommended that you follow up regarding the following health topic(s):    -annual physical and fasting lab work    Please call us at the [facility] - [phone] (or use [employer]) to address the above recommendations.     Thank you for trusting [facility] and we appreciate the opportunity to serve you.  We look forward to supporting your healthcare needs in the future.    Healthy Regards,    Your Health Care Team  [facility]

## 2019-08-05 NOTE — TELEPHONE ENCOUNTER
Left message for pt. Please help pt schedule an OV for physical with fasting lab.   Panel Management Review      Patient has the following on her problem list: None      Composite cancer screening  Chart review shows that this patient is due/due soon for the following None  Summary:    Patient is due/failing the following:   LDL, glucose, , HIV, physcial     Action needed:   Patient needs office visit for physcial. and Patient needs fasting lab only appointment    Type of outreach:    Phone, left message for patient to call back.     Questions for provider review:    None                                                                                                                                    Neri Manzo MA       Chart routed to Care Team .

## 2019-08-08 ENCOUNTER — OFFICE VISIT (OUTPATIENT)
Dept: DERMATOLOGY | Facility: CLINIC | Age: 54
End: 2019-08-08
Payer: COMMERCIAL

## 2019-08-08 DIAGNOSIS — D48.5 NEOPLASM OF UNCERTAIN BEHAVIOR OF SKIN: ICD-10-CM

## 2019-08-08 DIAGNOSIS — Z85.828 HISTORY OF NONMELANOMA SKIN CANCER: Primary | ICD-10-CM

## 2019-08-08 DIAGNOSIS — L82.1 SK (SEBORRHEIC KERATOSIS): ICD-10-CM

## 2019-08-08 DIAGNOSIS — L57.0 ACTINIC KERATOSIS: ICD-10-CM

## 2019-08-08 PROCEDURE — 88305 TISSUE EXAM BY PATHOLOGIST: CPT | Mod: TC | Performed by: DERMATOLOGY

## 2019-08-08 PROCEDURE — 11102 TANGNTL BX SKIN SINGLE LES: CPT | Performed by: DERMATOLOGY

## 2019-08-08 PROCEDURE — 17000 DESTRUCT PREMALG LESION: CPT | Mod: 59 | Performed by: DERMATOLOGY

## 2019-08-08 PROCEDURE — 99213 OFFICE O/P EST LOW 20 MIN: CPT | Mod: 25 | Performed by: DERMATOLOGY

## 2019-08-08 ASSESSMENT — PAIN SCALES - GENERAL: PAINLEVEL: NO PAIN (0)

## 2019-08-08 NOTE — NURSING NOTE
Oumou Carolina's goals for this visit include: hx of NMSC two areas of concern right forehead and right calf  She requests these members of her care team be copied on today's visit information:     PCP: Florinda Eaton    Referring Provider:  No referring provider defined for this encounter.    LMP  (LMP Unknown)     Do you need any medication refills at today's visit? N/a..Marybeth Allred RN

## 2019-08-08 NOTE — PROGRESS NOTES
[facility] Dermatology Note      Dermatology Problem List:  1. Family history of melanoma (mother)  2. NMSC  -BCC left jaw line, s/p excision 8/27/2018  -BCC, central abdomen, s/p excision 11/25/2015  -BCC, nodular, left frontal scalp, s/p Mohs 2/10/15  -SCCIS, left chest, s/p excision 2/10/15  -BCC, nodular with fibrosis, right chest, s/p excision 11/4/14   3. Actinic Keratosis  -s/p PDT 2019 X3  -Pigmented AK, left chest, s/p biopsy 8/10/2018  -Previous Tx: Efudex 3/2015, cryotherapy  4. Seborrheic dermatitis  -lidex 8/2018  5. Folliculitis, central chest, s/p biopsy 8/10/2018  6. EIC vs dermatofibroma, mid central back  7. Epidermoid cyst, back, s/p excision 2/21/19       Encounter Date: Aug 8, 2019    CC:  Chief Complaint   Patient presents with     RECHECK     areas of concern right forehead and right calf         History of Present Illness:  Ms. Oumou Carolina is a 54 year old female with a history of NMSC and family history of melanoma who presents for a skin check. The patient was last seen on 2/21/19 by Dr. Hyde when an epidermoid cyst on the back was excised. Today she reports areas of concern on the right forehead and right calf. Spot on the right calf has been there for 2 months. Right side of the nose has been flaring up. No other concerns. No changes in medical problems. No recent illness.     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 2-10-15     Basal cell carcinoma of left frontal scalp (hairline) s/p mohs 2-10-15     Elevated blood pressure reading without diagnosis of hypertension     Past Medical History:   Diagnosis Date     Allergy, unspecified not elsewhere classified 2003     s/p desensitization     Anxiety      Atrial tachycardia (H)     ablation 3/24/11     Bartholin's abscess 7/09     Basal cell carcinoma of anterior chest s/p excision 11-4-14 11/4/2014     Bradycardia      Excessive or frequent menstruation 2001    s/p TVH     Transient disorder of initiating or maintaining sleep 2005     Past Surgical History:   Procedure Laterality Date     ABDOMEN SURGERY  2001    Hysterectomy     ARTHROPLASTY TOE(S)  12/2/2013    Procedure: ARTHROPLASTY TOE(S);;  Surgeon: Vinayak Palacios DPM;  Location: [facility] OR     BIOPSY  2014, 2016, 2017    skin cancer biopsies, multiple     BUNIONECTOMY  12/2/2013    Procedure: BUNIONECTOMY;  Bunionectomy, resection of arthroplasty of PIP joint right foot;  Surgeon: Vinayak Palacios DPM;  Location: [facility] OR     C LIGATE FALLOPIAN TUBE       CARDIAC SURGERY  ablation for abnormal rhythym    2013     COSMETIC SURGERY  abdomiplasty, lipo    6/2016, 4/2017     EYE SURGERY  lasik    11/2007     HC REVISE MEDIAN N/CARPAL TUNNEL SURG      bilateral carpal tunnel repair     HYSTERECTOMY, PAP NO LONGER INDICATED  2001     HYSTERECTOMY, VAGINAL  1/30/01    TVH for menorraghia     REPAIR HAMMER TOE  12/19/2011    Procedure:REPAIR HAMMER TOE; Arthroplasty PIPJ 5th toe, left; Surgeon:VINAYAK PALACIOS; Location:[facility] OR     SURGICAL HISTORY OF -   8/09    Bilat LE Laser ablation, varicose veins     SURGICAL HISTORY OF -   3/24/11    atrial ablation     Social History:  The patient works as a .   reviewed    Family History:  There is a family history of melanoma in the patient's mother. Reviewed again    Medications:  No current outpatient medications on file.     Allergies   Allergen Reactions     No Known Drug Allergies      Review of Systems:  -Skin: As above in HPI. No additional skin concerns.   -Const: The patient is generally feeling well today.   -OB: not pregnant or breast feeding.      Physical exam:  GEN: This is a well developed, well-nourished female  in no acute distress, in a pleasant mood.    SKIN: Total skin excluding the undergarment areas was performed. The exam included the head/face, neck, both arms, chest, back, abdomen, both legs, digits and/or nails. Declines genital exam.   - Nails are painted  - There are yellow oily papules with central umbilication located on the right forehead.  - There is an erythematous macule with overyling adherent scale on the right forehead   - 3 mm violocaues macule on the right calf  - There is a waxy stuck on tan to brown papule on the right medial leg   - 5 mm dome shaped papule on the left nasolabial fold  - No other lesions of concern on areas examined.     Impression/Plan:  1. Family history of melanoma    2. History of nonmelanoma skin cancer - no evidence of recurrence    Recommend sunscreens SPF #30 or greater, protective clothing and avoidance of tanning beds.    3. Seborrheic keratosis, right medial leg     Monitor and recheck at the next visit     4. 3 mm violocaues macule on the right calf, DDx scar or angioma or early spider venule     Monitor and recheck at the next visit     5. Actinic keratosis - right forehead  Cryotherapy procedure note: After verbal consent and discussion of risks and benefits including but no limited to dyspigmentation/scar, blister, and pain, 1 was treated with 1-2mm freeze border for 2 cycles with liquid nitrogen. Post cryotherapy instructions were provided.     6.  NUB, 5 mm dome shaped papule on the left nasolabial fold, DDx nevus vs BCC    Shave biopsy:  After discussion of benefits and risks including but not limited to bleeding/bruising, pain/swelling, infection, scar, incomplete removal, nerve damage/numbness, recurrence, and non-diagnostic biopsy, written consent, verbal consent and photographs were obtained. Time-out was performed. The area was cleaned with isopropyl alcohol. 0.5mL of 1% lidocaine with epinephrine was injected to obtain adequate anesthesia of the lesion on the  left nasolabial fold. A shave biopsy was performed. Hemostasis was achieved with aluminium chloride. Vaseline and a sterile dressing were applied. The patient tolerated the procedure and no complications were noted. The patient was provided with verbal and written post care instructions.       Follow up in 9 months, earlier for new or changing lesions.     Staff Involved:  Scribe/Staff    Scribe Disclosure  I, Janeth Sam, am serving as a scribe to document services personally performed by Dr. Sonal Peters MD, based on data collection and the provider's statements to me.     Provider Disclosure:   The documentation recorded by the scribe accurately reflects the services I personally performed and the decisions made by me.    Sonal Peters MD    Department of Dermatology  [facility]: Phone: [phone], Fax:[phone]  [facility]: Phone: [phone], Fax: [phone]

## 2019-08-08 NOTE — PATIENT INSTRUCTIONS
Cryotherapy    What is it?    Use of a very cold liquid, such as liquid nitrogen, to freeze and destroy abnormal skin cells that need to be removed    What should I expect?    Tenderness and redness    A small blister that might grow and fill with dark purple blood. There may be crusting.    More than one treatment may be needed if the lesions do not go away.    How do I care for the treated area?    Gently wash the area with your hands when bathing.    Use a thin layer of Vaseline to help with healing. You may use a Band-Aid.     The area should heal within 7-10 days and may leave behind a pink or lighter color.     Do not use an antibiotic or Neosporin ointment.     You may take acetaminophen (Tylenol) for pain.     Call your Doctor if you have:    Severe pain    Signs of infection (warmth, redness, cloudy yellow drainage, and or a bad smell)    Questions or concerns    Who should I call with questions?       [facility]: [phone]       [facility]: [phone]       For urgent needs outside of business hours call the [facility] at [phone]        and ask for the dermatology resident on call        Wound Care After a Biopsy    What is a skin biopsy?  A skin biopsy allows the doctor to examine a very small piece of tissue under the microscope to determine the diagnosis and the best treatment for the skin condition. A local anesthetic (numbing medicine)  is injected with a very small needle into the skin area to be tested. A small piece of skin is taken from the area. Sometimes a suture (stitch) is used.     What are the risks of a skin biopsy?  I will experience scar, bleeding, swelling, pain, crusting and redness. I may experience incomplete removal or recurrence. Risks of this procedure are excessive bleeding, bruising, infection, nerve damage, numbness, thick (hypertrophic or keloidal) scar and non-diagnostic biopsy.    How should I  care for my wound for the first 24 hours?    Keep the wound dry and covered for 24 hours    If it bleeds, hold direct pressure on the area for 15 minutes. If bleeding does not stop then go to the emergency room    Avoid strenuous exercise the first 1-2 days or as your doctor instructs you    How should I care for the wound after 24 hours?    After 24 hours, remove the bandage    You may bathe or shower as normal    If you had a scalp biopsy, you can shampoo as usual and can use shower water to clean the biopsy site daily    Clean the wound twice a day with gentle soap and water    Do not scrub, be gentle    Apply white petroleum/Vaseline after cleaning the wound with a cotton swab or a clean finger, and keep the site covered with a Bandaid /bandage. Bandages are not necessary with a scalp biopsy    If you are unable to cover the site with a Bandaid /bandage, re-apply ointment 2-3 times a day to keep the site moist. Moisture will help with healing    Avoid strenuous activity for first 1-2 days    Avoid lakes, rivers, pools, and oceans until the stitches are removed or the site is healed    How do I clean my wound?    Wash hands thoroughly with soap or use hand  before all wound care    Clean the wound with gentle soap and water    Apply white petroleum/Vaseline  to wound after it is clean    Replace the Bandaid /bandage to keep the wound covered for the first few days or as instructed by your doctor    If you had a scalp biopsy, warm shower water to the area on a daily basis should suffice    What should I use to clean my wound?     Cotton-tipped applicators (Qtips )    White petroleum jelly (Vaseline ). Use a clean new container and use Q-tips to apply.    Bandaids   as needed    Gentle soap     How should I care for my wound long term?    Do not get your wound dirty    Keep up with wound care for one week or until the area is healed.    A small scab will form and fall off by itself when the area is  completely healed. The area will be red and will become pink in color as it heals. Sun protection is very important for how your scar will turn out. Sunscreen with an SPF 30 or greater is recommended once the area is healed.    You should have some soreness but it should be mild and slowly go away over several days. Talk to your doctor about using tylenol for pain,    When should I call my doctor?  If you have increased:     Pain or swelling    Pus or drainage (clear or slightly yellow drainage is ok)    Temperature over 100F    Spreading redness or warmth around wound    When will I hear about my results?  The biopsy results can take 2-3 weeks to come back. The clinic will call you with the results, send you a [employer] message, or have you schedule a follow-up clinic or phone time to discuss the results. Contact our clinics if you do not hear from us in 3 weeks.     Who should I call with questions?    [facility]: [phone]     [facility]: [phone]    For urgent needs outside of business hours call the [facility] at [phone] and ask for the dermatology resident on call

## 2019-08-08 NOTE — LETTER
8/8/2019         RE: Oumou Carolina  [address] [facility] [address]        Dear Colleague,    Thank you for referring your patient, Oumou Carolina, to the [facility]. Please see a copy of my visit note below.    [facility] Dermatology Note      Dermatology Problem List:  1. Family history of melanoma (mother)  2. NMSC  -BCC left jaw line, s/p excision 8/27/2018  -BCC, central abdomen, s/p excision 11/25/2015  -BCC, nodular, left frontal scalp, s/p Mohs 2/10/15  -SCCIS, left chest, s/p excision 2/10/15  -BCC, nodular with fibrosis, right chest, s/p excision 11/4/14   3. Actinic Keratosis  -s/p PDT 2019 X3  -Pigmented AK, left chest, s/p biopsy 8/10/2018  -Previous Tx: Efudex 3/2015, cryotherapy  4. Seborrheic dermatitis  -lidex 8/2018  5. Folliculitis, central chest, s/p biopsy 8/10/2018  6. EIC vs dermatofibroma, mid central back  7. Epidermoid cyst, back, s/p excision 2/21/19       Encounter Date: Aug 8, 2019    CC:  Chief Complaint   Patient presents with     RECHECK     areas of concern right forehead and right calf         History of Present Illness:  Ms. Oumou Carolina is a 54 year old female with a history of NMSC and family history of melanoma who presents for a skin check. The patient was last seen on 2/21/19 by Dr. Hyde when an epidermoid cyst on the back was excised. Today she reports areas of concern on the right forehead and right calf. Spot on the right calf has been there for 2 months. Right side of the nose has been flaring up. No other concerns. No changes in medical problems. No recent illness.     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 2-10-15      Basal cell carcinoma of left frontal scalp (hairline) s/p mohs 2-10-15     Elevated blood pressure reading without diagnosis of hypertension     Past Medical History:   Diagnosis Date     Allergy, unspecified not elsewhere classified 2003    s/p desensitization     Anxiety      Atrial tachycardia (H)     ablation 3/24/11     Bartholin's abscess 7/09     Basal cell carcinoma of anterior chest s/p excision 11-4-14 11/4/2014     Bradycardia      Excessive or frequent menstruation 2001    s/p TVH     Transient disorder of initiating or maintaining sleep 2005     Past Surgical History:   Procedure Laterality Date     ABDOMEN SURGERY  2001    Hysterectomy     ARTHROPLASTY TOE(S)  12/2/2013    Procedure: ARTHROPLASTY TOE(S);;  Surgeon: Vinayak Palacios DPM;  Location: [facility] OR     BIOPSY  2014, 2016, 2017    skin cancer biopsies, multiple     BUNIONECTOMY  12/2/2013    Procedure: BUNIONECTOMY;  Bunionectomy, resection of arthroplasty of PIP joint right foot;  Surgeon: Vinayak Palacios DPM;  Location: [facility] OR     C LIGATE FALLOPIAN TUBE       CARDIAC SURGERY  ablation for abnormal rhythym    2013     COSMETIC SURGERY  abdomiplasty, lipo    6/2016, 4/2017     EYE SURGERY  lasik    11/2007     HC REVISE MEDIAN N/CARPAL TUNNEL SURG      bilateral carpal tunnel repair     HYSTERECTOMY, PAP NO LONGER INDICATED  2001     HYSTERECTOMY, VAGINAL  1/30/01    TVH for menorraghia     REPAIR HAMMER TOE  12/19/2011    Procedure:REPAIR HAMMER TOE; Arthroplasty PIPJ 5th toe, left; Surgeon:VINAYAK PALACIOS; Location:[facility] OR     SURGICAL HISTORY OF -   8/09    Bilat LE Laser ablation, varicose veins     SURGICAL HISTORY OF -   3/24/11    atrial ablation     Social History:  The patient works as a .   reviewed    Family History:  There is a family history of melanoma in the patient's mother. Reviewed again    Medications:  No current outpatient medications on file.     Allergies   Allergen Reactions     No Known Drug Allergies       Review of Systems:  -Skin: As above in HPI. No additional skin concerns.   -Const: The patient is generally feeling well today.   -OB: not pregnant or breast feeding.      Physical exam:  GEN: This is a well developed, well-nourished female in no acute distress, in a pleasant mood.    SKIN: Total skin excluding the undergarment areas was performed. The exam included the head/face, neck, both arms, chest, back, abdomen, both legs, digits and/or nails. Declines genital exam.   - Nails are painted  - There are yellow oily papules with central umbilication located on the right forehead.  - There is an erythematous macule with overyling adherent scale on the right forehead   - 3 mm violocaues macule on the right calf  - There is a waxy stuck on tan to brown papule on the right medial leg   - 5 mm dome shaped papule on the left nasolabial fold  - No other lesions of concern on areas examined.     Impression/Plan:  1. Family history of melanoma    2. History of nonmelanoma skin cancer - no evidence of recurrence    Recommend sunscreens SPF #30 or greater, protective clothing and avoidance of tanning beds.    3. Seborrheic keratosis, right medial leg     Monitor and recheck at the next visit     4. 3 mm violocaues macule on the right calf, DDx scar or angioma or early spider venule     Monitor and recheck at the next visit     5. Actinic keratosis - right forehead  Cryotherapy procedure note: After verbal consent and discussion of risks and benefits including but no limited to dyspigmentation/scar, blister, and pain, 1 was treated with 1-2mm freeze border for 2 cycles with liquid nitrogen. Post cryotherapy instructions were provided.     6.  NUB, 5 mm dome shaped papule on the left nasolabial fold, DDx nevus vs BCC    Shave biopsy:  After discussion of benefits and risks including but not limited to bleeding/bruising, pain/swelling, infection, scar, incomplete removal, nerve damage/numbness, recurrence, and  non-diagnostic biopsy, written consent, verbal consent and photographs were obtained. Time-out was performed. The area was cleaned with isopropyl alcohol. 0.5mL of 1% lidocaine with epinephrine was injected to obtain adequate anesthesia of the lesion on the left nasolabial fold. A shave biopsy was performed. Hemostasis was achieved with aluminium chloride. Vaseline and a sterile dressing were applied. The patient tolerated the procedure and no complications were noted. The patient was provided with verbal and written post care instructions.       Follow up in 9 months, earlier for new or changing lesions.     Staff Involved:  Scribe/Staff    Scribe Disclosure  I, Janeth Sam, am serving as a scribe to document services personally performed by Dr. Sonal Peters MD, based on data collection and the provider's statements to me.     Provider Disclosure:   The documentation recorded by the scribe accurately reflects the services I personally performed and the decisions made by me.    Sonal Peters MD    Department of Dermatology  [facility]: Phone: [phone], Fax:[phone]  [facility]: Phone: [phone], Fax: [phone]                    Again, thank you for allowing me to participate in the care of your patient.        Sincerely,        Sonal Peters MD

## 2019-08-13 LAB — COPATH REPORT: NORMAL

## 2019-09-19 NOTE — TELEPHONE ENCOUNTER
Left another message for pt. Please help pt scheduled an OV for physical with fasting lab.   Neri Manzo MA

## 2019-09-28 ENCOUNTER — HEALTH MAINTENANCE LETTER (OUTPATIENT)
Age: 54
End: 2019-09-28

## 2019-10-24 ENCOUNTER — TELEPHONE (OUTPATIENT)
Dept: FAMILY MEDICINE | Facility: CLINIC | Age: 54
End: 2019-10-24

## 2019-10-24 NOTE — LETTER
[facility]  [address], [address]  [facility] [address]  Phone: [phone]  Fax: [phone]  October 24, 2019      Oumou Carolina  [address] [place] [id number]      Dear Oumou,    We care about your health and have reviewed your health plan including your medical conditions, medications, and lab results.  Based on this review, it is recommended that you follow up regarding the following health topic(s):  -Colon Cancer Screening  -Wellness (Physical) Visit     We recommend you take the following action(s):  -schedule a WELLNESS (Physical) APPOINTMENT.  We will perform the following labs: Lipids (fasting cholesterol - nothing to eat except water and/or meds for 8-10 hours) and TSH (thyroid test).  -schedule a COLONOSCOPY to look for colon cancer (due every 10 years or 5 years in higher risk situations.)  Colonoscopies can prevent 90-95% of colon cancer deaths.  Problem lesions can be removed before they ever become cancer.  If you do not wish to do a colonoscopy or cannot afford to do one at this time, there is another option called a Fecal Immunochemical Occult Blood Test (FIT) a take home stool sample kit.  It does not replace the colonoscopy for colorectal cancer screening, but it can detect hidden bleeding in the lower colon.  It does need to be repeated every year and if a positive result is obtained, you would be referred for a colonoscopy.  If you have completed either one of these tests at another facility, please have the records sent to our clinic for our records.     Please call us at the [facility] - [phone] (or use [employer]) to address the above recommendations.     Thank you for trusting [facility] and we appreciate the opportunity to serve you.  We look forward to supporting your healthcare needs in the future.    Healthy Regards,    Your Health Care Team  [facility]/pl

## 2019-10-24 NOTE — TELEPHONE ENCOUNTER
Panel Management Review      Patient has the following on her problem list: None      Composite cancer screening  Chart review shows that this patient is due/due soon for the following Fecal Colorectal (FIT)  Summary:    Patient is due/failing the following:   TSH , LDL and PHYSICAL    Action needed:   Patient needs office visit for physical. and Patient needs referral/order: FIT     Type of outreach:    Sent letter.    Questions for provider review:    None                                                                                                                                    Neri Manzo MA       Chart routed to Care Team .

## 2019-11-21 NOTE — PROGRESS NOTES
SUBJECTIVE:   CC: Oumou Carolina is an 54 year old woman who presents for preventive health visit.     Healthy Habits:     Getting at least 3 servings of Calcium per day:  Yes    Bi-annual eye exam:  Yes    Dental care twice a year:  Yes    Sleep apnea or symptoms of sleep apnea:  None    Diet:  Low fat/cholesterol and Carbohydrate counting    Frequency of exercise:  6-7 days/week    Duration of exercise:  45-60 minutes    Taking medications regularly:  Yes    Medication side effects:  Not applicable    PHQ-2 Total Score: 0    Additional concerns today:  No    Patient notes that her granddaughter is not living with her even with her daughter's continues issues with keeping a steady job and taking care of herself and her granddaughter. She notes that she is otherwise doing well.     She had an injection in her left ankle on 6/5/19 and was told that from here out it is just about treating her symptoms. She has since stopped running, but continues with kickboxing. She is slightly frustrated with her weight loss efforts as she has cut out all sugar and has been following a healthy diet without results.     Denies vaginal bleeding, chest pain, palpitations, headache, shortness of breath, lightheadedness, urinary symptoms, bowel changes, lower extremity swelling, or abdominal pain.       Today's PHQ-2 Score:   PHQ-2 ( 1999 [employer]) 11/23/2019   Q1: Little interest or pleasure in doing things 0   Q2: Feeling down, depressed or hopeless 0   PHQ-2 Score 0   Q1: Little interest or pleasure in doing things Not at all   Q2: Feeling down, depressed or hopeless Not at all   PHQ-2 Score 0       Abuse: Current or Past(Physical, Sexual or Emotional)- No  Do you feel safe in your environment? Yes    Social History     Tobacco Use     Smoking status: Never Smoker     Smokeless tobacco: Never Used   Substance Use Topics     Alcohol use: No     Frequency: Never     Comment: none at all        Alcohol Use 11/23/2019   Prescreen: >3  drinks/day or >7 drinks/week? Not Applicable   Prescreen: >3 drinks/day or >7 drinks/week? -     Reviewed orders with patient.  Reviewed health maintenance and updated orders accordingly - Yes  BP Readings from Last 3 Encounters:   11/25/19 112/86   02/21/19 141/88   02/04/19 112/70    Wt Readings from Last 3 Encounters:   11/25/19 81.1 kg (178 lb 11.2 oz)   02/04/19 80.3 kg (177 lb)   01/22/18 79.4 kg (175 lb)                  Patient Active Problem List   Diagnosis     Persistent disorder of initiating or maintaining sleep     Hyperlipidemia LDL goal <160     Sinus bradycardia     Hammer toe     Hx of hysterectomy     Basal cell carcinoma of anterior chest s/p excision 11-4-14     Squamous cell carcinoma in situ left chest s/p excision 2-10-15     Basal cell carcinoma of left frontal scalp (hairline) s/p mohs 2-10-15     Elevated blood pressure reading without diagnosis of hypertension     History of nonmelanoma skin cancer     Actinic keratosis     Past Surgical History:   Procedure Laterality Date     ABDOMEN SURGERY  2001    Hysterectomy     ARTHROPLASTY TOE(S)  12/2/2013    Procedure: ARTHROPLASTY TOE(S);;  Surgeon: Vinayak Palacios DPM;  Location: [facility]     BIOPSY  2014, 2016, 2017    skin cancer biopsies, multiple     BUNIONECTOMY  12/2/2013    Procedure: BUNIONECTOMY;  Bunionectomy, resection of arthroplasty of PIP joint right foot;  Surgeon: Vinayak Palacios DPM;  Location:  OR     C LIGATE FALLOPIAN TUBE       CARDIAC SURGERY  ablation for abnormal rhythym    2013     COSMETIC SURGERY  abdomiplasty, lipo    6/2016, 4/2017     EYE SURGERY  lasik    11/2007     HC REVISE MEDIAN N/CARPAL TUNNEL SURG      bilateral carpal tunnel repair     HYSTERECTOMY, PAP NO LONGER INDICATED  2001     HYSTERECTOMY, VAGINAL  1/30/01    TVH for menorraghia     REPAIR HAMMER TOE  12/19/2011    Procedure:REPAIR HAMMER TOE; Arthroplasty PIPJ 5th toe, left; Surgeon:VINAYAK PALACIOS; Location: OR     SURGICAL HISTORY OF -    8/09    Bilat LE Laser ablation, varicose veins     SURGICAL HISTORY OF -   3/24/11    atrial ablation       Social History     Tobacco Use     Smoking status: Never Smoker     Smokeless tobacco: Never Used   Substance Use Topics     Alcohol use: No     Frequency: Never     Comment: none at all      Family History   Problem Relation Age of Onset     Obesity Father      Cancer Mother         Skin cancer on lip     Other Cancer Mother         melanoma, basal cell carcinoma, squamous cell skin     Hypertension No family hx of      Coronary Artery Disease No family hx of      Diabetes No family hx of      Hyperlipidemia No family hx of      Breast Cancer No family hx of      Cancer - colorectal No family hx of      Depression/Anxiety No family hx of      Cerebrovascular Disease No family hx of      Thyroid Disease No family hx of      Asthma No family hx of      Chemical Addiction No family hx of      Known Genetic Syndrome No family hx of      Osteoporosis No family hx of      Anesthesia Reaction No family hx of      Prostate Cancer No family hx of      Ovarian Cancer No family hx of          No current outpatient medications on file.     Allergies   Allergen Reactions     No Known Drug Allergies      Recent Labs   Lab Test 11/25/19  0849 09/28/17  0833 11/28/16  1145 12/03/14  0736 04/04/13  0812   * 86  --  91 109    92  --  95 77   TRIG 82 61  --  54 59   ALT  --   --   --   --  35   CR  --  0.67 0.72  --  0.79   GFRESTIMATED  --  >90 85  --  78   GFRESTBLACK  --  >90 >90  African American GFR Calc    --  >90   POTASSIUM  --  4.1 3.8  --  3.8   TSH 1.15  --   --  1.23 1.21      Mammogram Screening: Patient over age 50, mutual decision to screen reflected in health maintenance. Pertinent mammograms are reviewed under the imaging tab.    History of abnormal Pap smear: NO - age 30-65 PAP every 5 years with negative HPV co-testing recommended  Last 3 Pap and HPV Results:       Reviewed and updated as  needed this visit by clinical staff  Tobacco  Allergies  Meds  Problems  Med Hx  Surg Hx  Fam Hx  Soc Hx          Reviewed and updated as needed this visit by Provider  Tobacco  Allergies  Meds  Problems  Med Hx  Surg Hx  Fam Hx        Past Medical History:   Diagnosis Date     Allergy, unspecified not elsewhere classified     s/p desensitization     Anxiety      Atrial tachycardia (H)     ablation 3/24/11     Bartholin's abscess      Basal cell carcinoma of anterior chest s/p excision 14     Bradycardia      Excessive or frequent menstruation     s/p TVH     Transient disorder of initiating or maintaining sleep       Past Surgical History:   Procedure Laterality Date     ABDOMEN SURGERY      Hysterectomy     ARTHROPLASTY TOE(S)  2013    Procedure: ARTHROPLASTY TOE(S);;  Surgeon: Vinayak Palacios DPM;  Location: [facility]     BIOPSY  , ,     skin cancer biopsies, multiple     BUNIONECTOMY  2013    Procedure: BUNIONECTOMY;  Bunionectomy, resection of arthroplasty of PIP joint right foot;  Surgeon: Vinayak Palacios DPM;  Location: [facility] OR     C LIGATE FALLOPIAN TUBE       CARDIAC SURGERY  ablation for abnormal rhythym         COSMETIC SURGERY  abdomiplasty, lipo    2016, 2017     EYE SURGERY  lasik    2007     HC REVISE MEDIAN N/CARPAL TUNNEL SURG      bilateral carpal tunnel repair     HYSTERECTOMY, PAP NO LONGER INDICATED       HYSTERECTOMY, VAGINAL  01    TVH for menorraghia     REPAIR HAMMER TOE  2011    Procedure:REPAIR HAMMER TOE; Arthroplasty PIPJ 5th toe, left; Surgeon:VINAYAK PALACIOS; Location:[facility]     SURGICAL HISTORY OF -       Bilat LE Laser ablation, varicose veins     SURGICAL HISTORY OF -   3/24/11    atrial ablation     OB History    Para Term  AB Living   2 1 1 0 1 1   SAB TAB Ectopic Multiple Live Births   1 0 0 0 0      # Outcome Date GA Lbr Dean/2nd Weight Sex Delivery Anes PTL Lv   2 SAB  "           1 Term               Obstetric Comments    x 1 /         Review of Systems   Constitutional: Negative for chills and fever.   HENT: Negative for congestion, ear pain, hearing loss and sore throat.    Eyes: Negative for pain and visual disturbance.   Respiratory: Negative for cough and shortness of breath.    Cardiovascular: Negative for chest pain, palpitations and peripheral edema.   Gastrointestinal: Negative for abdominal pain, constipation, diarrhea, heartburn, hematochezia and nausea.   Breasts:  Negative for tenderness, breast mass and discharge.   Genitourinary: Negative for dysuria, frequency, genital sores, hematuria, pelvic pain, urgency, vaginal bleeding and vaginal discharge.   Musculoskeletal: Negative for arthralgias, joint swelling and myalgias.   Skin: Negative for rash.   Neurological: Negative for dizziness, weakness, headaches and paresthesias.   Psychiatric/Behavioral: Negative for mood changes. The patient is nervous/anxious.      This document serves as a record of the services and decisions personally performed and made by Florinda Eaton CNP. It was created on his/her behalf by Rosemarie Rolon, trained medical scribe. The creation of this document is based the provider's statements to the medical scribes.    Scribjaimie Rolon 8:35 AM, 2019  OBJECTIVE:   /86   Pulse 66   Temp 98.4  F (36.9  C) (Temporal)   Resp 16   Ht 1.676 m (5' 6\")   Wt 81.1 kg (178 lb 11.2 oz)   LMP  (LMP Unknown)   SpO2 98%   Breastfeeding No   BMI 28.84 kg/m       Physical Exam  GENERAL APPEARANCE: healthy, alert and no distress  EYES: Eyes grossly normal to inspection, PERRL and conjunctivae and sclerae normal  HENT: ear canals and TM's normal, nose and mouth without ulcers or lesions, oropharynx clear and oral mucous membranes moist  NECK: no adenopathy, no asymmetry, masses, or scars and thyroid normal to palpation  RESP: lungs clear to auscultation - no rales, rhonchi " or wheezes  BREAST: normal without masses, tenderness or nipple discharge and no palpable axillary masses or adenopathy  CV: regular rate and rhythm, normal S1 S2, no S3 or S4, no murmur, click or rub, no peripheral edema and peripheral pulses strong  ABDOMEN: soft, nontender, no hepatosplenomegaly, no masses and bowel sounds normal  MS: no musculoskeletal defects are noted and gait is age appropriate without ataxia  SKIN: no suspicious lesions or rashes  NEURO: Normal strength and tone, sensory exam grossly normal, mentation intact and speech normal  PSYCH: mentation appears normal and affect normal/bright    Diagnostic Test Results:  Labs reviewed in Epic  Results for orders placed or performed in visit on 11/25/19 (from the past 24 hour(s))   TSH with free T4 reflex   Result Value Ref Range    TSH 1.15 0.40 - 4.00 mU/L   Lipid panel reflex to direct LDL Fasting   Result Value Ref Range    Cholesterol 260 (H) <200 mg/dL    Triglycerides 82 <150 mg/dL    HDL Cholesterol 104 >49 mg/dL    LDL Cholesterol Calculated 140 (H) <100 mg/dL    Non HDL Cholesterol 156 (H) <130 mg/dL   Hemoglobin   Result Value Ref Range    Hemoglobin 15.0 11.7 - 15.7 g/dL   Glucose   Result Value Ref Range    Glucose 89 70 - 99 mg/dL       ASSESSMENT/PLAN:       ICD-10-CM    1. Routine general medical examination at a health care facility Z00.00 HIV Screening     TSH with free T4 reflex     Lipid panel reflex to direct LDL Fasting     Hemoglobin     Glucose     GASTROENTEROLOGY ADULT REF PROCEDURE ONLY [facility] [phone]; [facility]   2. Screening for colon cancer Z12.11 GASTROENTEROLOGY ADULT REF PROCEDURE ONLY [facility] [phone]; [facility]     Discussed hyperlipidemia, sleep, mood, dermatology, healthy diet, and regular exercise at length with patient today. Physical exam completed today but patient deferred  exam as she is asymptomatic. Updated routine screening lab work with one time  "HIV screening lab placed today; will notify with results.     Gastroenterology referral provided for patient to schedule her routine screening colonoscopy.    Discussed Shingrex vaccination with patient and she will follow up with her pharmacy benefit to receive.     Follow up with PCP in 1 year.     COUNSELING:  Reviewed preventive health counseling, as reflected in patient instructions       Regular exercise       Healthy diet/nutrition       Safe sex practices/STD prevention       Colon cancer screening       One time HIV screening in low risk adults.        (Eliza)menopause management    Estimated body mass index is 28.84 kg/m  as calculated from the following:    Height as of this encounter: 1.676 m (5' 6\").    Weight as of this encounter: 81.1 kg (178 lb 11.2 oz).    Weight management plan: Discussed healthy diet and exercise guidelines     reports that she has never smoked. She has never used smokeless tobacco.      Counseling Resources:  ATP IV Guidelines  Pooled Cohorts Equation Calculator  Breast Cancer Risk Calculator  FRAX Risk Assessment  ICSI Preventive Guidelines  Dietary Guidelines for Americans, 2010  USDA's MyPlate  ASA Prophylaxis  Lung CA Screening    The information in this document, created by the medical scribe for me, accurately reflects the services I personally performed and the decisions made by me. I have reviewed and approved this document for accuracy prior to leaving the patient care area.  Florinda Eaton CNP  8:35 AM, 11/25/19    TORIBIO Steen CNP  [facility]"

## 2019-11-23 ASSESSMENT — ENCOUNTER SYMPTOMS
SHORTNESS OF BREATH: 0
EYE PAIN: 0
DIZZINESS: 0
SORE THROAT: 0
PARESTHESIAS: 0
HEMATOCHEZIA: 0
WEAKNESS: 0
MYALGIAS: 0
DYSURIA: 0
HEMATURIA: 0
NERVOUS/ANXIOUS: 1
BREAST MASS: 0
ARTHRALGIAS: 0
JOINT SWELLING: 0
FEVER: 0
DIARRHEA: 0
ABDOMINAL PAIN: 0
COUGH: 0
NAUSEA: 0
FREQUENCY: 0
PALPITATIONS: 0
CONSTIPATION: 0
HEADACHES: 0
CHILLS: 0
HEARTBURN: 0

## 2019-11-25 ENCOUNTER — OFFICE VISIT (OUTPATIENT)
Dept: FAMILY MEDICINE | Facility: CLINIC | Age: 54
End: 2019-11-25
Payer: COMMERCIAL

## 2019-11-25 VITALS
WEIGHT: 178.7 LBS | HEART RATE: 66 BPM | TEMPERATURE: 98.4 F | BODY MASS INDEX: 28.72 KG/M2 | SYSTOLIC BLOOD PRESSURE: 112 MMHG | DIASTOLIC BLOOD PRESSURE: 86 MMHG | RESPIRATION RATE: 16 BRPM | HEIGHT: 66 IN | OXYGEN SATURATION: 98 %

## 2019-11-25 DIAGNOSIS — Z00.00 ROUTINE GENERAL MEDICAL EXAMINATION AT A HEALTH CARE FACILITY: Primary | ICD-10-CM

## 2019-11-25 DIAGNOSIS — Z12.11 SCREENING FOR COLON CANCER: ICD-10-CM

## 2019-11-25 LAB
CHOLEST SERPL-MCNC: 260 MG/DL
GLUCOSE SERPL-MCNC: 89 MG/DL (ref 70–99)
HDLC SERPL-MCNC: 104 MG/DL
HGB BLD-MCNC: 15 G/DL (ref 11.7–15.7)
LDLC SERPL CALC-MCNC: 140 MG/DL
NONHDLC SERPL-MCNC: 156 MG/DL
TRIGL SERPL-MCNC: 82 MG/DL
TSH SERPL DL<=0.005 MIU/L-ACNC: 1.15 MU/L (ref 0.4–4)

## 2019-11-25 PROCEDURE — 82947 ASSAY GLUCOSE BLOOD QUANT: CPT | Performed by: NURSE PRACTITIONER

## 2019-11-25 PROCEDURE — 80061 LIPID PANEL: CPT | Performed by: NURSE PRACTITIONER

## 2019-11-25 PROCEDURE — 85018 HEMOGLOBIN: CPT | Performed by: NURSE PRACTITIONER

## 2019-11-25 PROCEDURE — 84443 ASSAY THYROID STIM HORMONE: CPT | Performed by: NURSE PRACTITIONER

## 2019-11-25 PROCEDURE — 87389 HIV-1 AG W/HIV-1&-2 AB AG IA: CPT | Performed by: NURSE PRACTITIONER

## 2019-11-25 PROCEDURE — 99396 PREV VISIT EST AGE 40-64: CPT | Performed by: NURSE PRACTITIONER

## 2019-11-25 PROCEDURE — 36415 COLL VENOUS BLD VENIPUNCTURE: CPT | Performed by: NURSE PRACTITIONER

## 2019-11-25 ASSESSMENT — MIFFLIN-ST. JEOR: SCORE: 1432.33

## 2019-11-25 ASSESSMENT — ENCOUNTER SYMPTOMS
HEMATOCHEZIA: 0
JOINT SWELLING: 0
SHORTNESS OF BREATH: 0
NERVOUS/ANXIOUS: 1
FEVER: 0
DYSURIA: 0
PALPITATIONS: 0
MYALGIAS: 0
PARESTHESIAS: 0
ARTHRALGIAS: 0
NAUSEA: 0
FREQUENCY: 0
DIARRHEA: 0
DIZZINESS: 0
EYE PAIN: 0
WEAKNESS: 0
SORE THROAT: 0
HEMATURIA: 0
HEADACHES: 0
HEARTBURN: 0
CHILLS: 0
CONSTIPATION: 0
ABDOMINAL PAIN: 0
COUGH: 0
BREAST MASS: 0

## 2019-11-26 LAB — HIV 1+2 AB+HIV1 P24 AG SERPL QL IA: NONREACTIVE

## 2019-12-02 ENCOUNTER — TELEPHONE (OUTPATIENT)
Dept: DERMATOLOGY | Facility: CLINIC | Age: 54
End: 2019-12-02

## 2019-12-02 NOTE — TELEPHONE ENCOUNTER
[facility] Call Center    Phone Message    May a detailed message be left on voicemail: yes    Reason for Call: Symptoms or Concerns     Patient has a spot on face/jawline by ear and says its same as shes had before which was cancerous. Wants biopsied before end of the year. Please call to advise.      Action Taken: Message routed to:  Adult Clinics: Dermatology p [phone]

## 2019-12-02 NOTE — TELEPHONE ENCOUNTER
Patient scheduled with [facility] on 12-4-19 for evaluation of area of concern on right jawline.     Nicole Mims LPN

## 2019-12-04 ENCOUNTER — OFFICE VISIT (OUTPATIENT)
Dept: DERMATOLOGY | Facility: CLINIC | Age: 54
End: 2019-12-04
Payer: COMMERCIAL

## 2019-12-04 DIAGNOSIS — D48.5 NEOPLASM OF UNCERTAIN BEHAVIOR OF SKIN: Primary | ICD-10-CM

## 2019-12-04 PROCEDURE — 88305 TISSUE EXAM BY PATHOLOGIST: CPT | Mod: TC | Performed by: PHYSICIAN ASSISTANT

## 2019-12-04 PROCEDURE — 11102 TANGNTL BX SKIN SINGLE LES: CPT | Performed by: PHYSICIAN ASSISTANT

## 2019-12-04 RX ORDER — LIDOCAINE HYDROCHLORIDE AND EPINEPHRINE 10; 10 MG/ML; UG/ML
3 INJECTION, SOLUTION INFILTRATION; PERINEURAL ONCE
Status: DISCONTINUED | OUTPATIENT
Start: 2019-12-04 | End: 2020-05-01

## 2019-12-04 ASSESSMENT — PAIN SCALES - GENERAL: PAINLEVEL: NO PAIN (0)

## 2019-12-04 NOTE — PROGRESS NOTES
[facility] Dermatology Note      Dermatology Problem List:  1. Family history of melanoma (mother)  2. NMSC  -BCC left jaw line, s/p excision 8/27/2018  -BCC, central abdomen, s/p excision 11/25/2015  -BCC, nodular, left frontal scalp, s/p Mohs 2/10/15  -SCCIS, left chest, s/p excision 2/10/15  -BCC, nodular with fibrosis, right chest, s/p excision 11/4/14   3. Actinic Keratosis  -s/p PDT 2019 X3  -Pigmented AK, left chest, s/p biopsy 8/10/2018  -Previous Tx: Efudex 3/2015, cryotherapy  4. Seborrheic dermatitis  -lidex 8/2018  5. Folliculitis, central chest, s/p biopsy 8/10/2018  6. EIC vs dermatofibroma, mid central back  7. Epidermoid cyst, back, s/p excision 2/21/19     Encounter Date: Dec 4, 2019    CC:  Chief Complaint   Patient presents with     Lesion     right jawline - has been there for about 3 months and slowly growing         History of Present Illness:  Ms. Oumou Carolina is a 54 year old female who presents for a spot check. She has a lesion on the right jawline that is small but resembles her prior basal cell carcinoma on the left jawline. This has been present for 3 months. It is not tender and does not bleed or itch. She is not wearing makeup today. She has not other concerns. Most recent skin cancer was a BCC on the left jawline which was removed in 2018.    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 2-10-15     Basal cell carcinoma of left frontal scalp (hairline) s/p mohs 2-10-15     Elevated blood pressure reading without diagnosis of hypertension     History of nonmelanoma skin cancer     Actinic keratosis     Past Medical History:   Diagnosis Date     Allergy, unspecified not  elsewhere classified 2003    s/p desensitization     Anxiety      Atrial tachycardia (H)     ablation 3/24/11     Bartholin's abscess 7/09     Basal cell carcinoma of anterior chest s/p excision 11-4-14 11/4/2014     Bradycardia      Excessive or frequent menstruation 2001    s/p TVH     Transient disorder of initiating or maintaining sleep 2005     Past Surgical History:   Procedure Laterality Date     ABDOMEN SURGERY  2001    Hysterectomy     ARTHROPLASTY TOE(S)  12/2/2013    Procedure: ARTHROPLASTY TOE(S);;  Surgeon: Vinayak Palacios DPM;  Location: [facility] OR     BIOPSY  2014, 2016, 2017    skin cancer biopsies, multiple     BUNIONECTOMY  12/2/2013    Procedure: BUNIONECTOMY;  Bunionectomy, resection of arthroplasty of PIP joint right foot;  Surgeon: Vinayak Palacios DPM;  Location:  OR     C LIGATE FALLOPIAN TUBE       CARDIAC SURGERY  ablation for abnormal rhythym    2013     COSMETIC SURGERY  abdomiplasty, lipo    6/2016, 4/2017     EYE SURGERY  lasik    11/2007     HC REVISE MEDIAN N/CARPAL TUNNEL SURG      bilateral carpal tunnel repair     HYSTERECTOMY, PAP NO LONGER INDICATED  2001     HYSTERECTOMY, VAGINAL  1/30/01    TVH for menorraghia     REPAIR HAMMER TOE  12/19/2011    Procedure:REPAIR HAMMER TOE; Arthroplasty PIPJ 5th toe, left; Surgeon:VINAYAK PALACIOS; Location:[facility] OR     SURGICAL HISTORY OF -   8/09    Bilat LE Laser ablation, varicose veins     SURGICAL HISTORY OF -   3/24/11    atrial ablation       Social History:  Patient reports that she has never smoked. She has never used smokeless tobacco. She reports that she does not drink alcohol or use drugs.    Family History:  Family History   Problem Relation Age of Onset     Obesity Father      Cancer Mother         Skin cancer on lip     Other Cancer Mother         melanoma, basal cell carcinoma, squamous cell skin     Hypertension No family hx of      Coronary Artery Disease No family hx of      Diabetes No family hx of      Hyperlipidemia No  family hx of      Breast Cancer No family hx of      Cancer - colorectal No family hx of      Depression/Anxiety No family hx of      Cerebrovascular Disease No family hx of      Thyroid Disease No family hx of      Asthma No family hx of      Chemical Addiction No family hx of      Known Genetic Syndrome No family hx of      Osteoporosis No family hx of      Anesthesia Reaction No family hx of      Prostate Cancer No family hx of      Ovarian Cancer No family hx of        Medications:  No current outpatient medications on file.       Allergies   Allergen Reactions     No Known Drug Allergies        Review of Systems:  -As per HPI  -Constitutional: Otherwise feeling well today, in usual state of health.  -HEENT: Patient denies nonhealing oral sores.  -Heme/Lymph: no concerning bumps, no bleeding or bruising problems   -Skin: As above in HPI. No additional skin concerns.    Physical exam:  Vitals: LMP  (LMP Unknown)   GEN: This is a well developed, well-nourished female in no acute distress, in a pleasant mood.    SKIN: Focused examination of the face was performed.  -Orellana skin type: I  -3-4 mm pink papule right jawline  -No other lesions of concern on areas examined.       Impression/Plan:  1. Neoplasm of uncertain behavior on the right jawline. The differential diagnosis includes BCC vs other.   - Shave biopsy:  After discussion of benefits and risks including but not limited to bleeding/bruising, pain/swelling, infection, scar, incomplete removal, nerve damage/numbness, recurrence, and non-diagnostic biopsy, written consent, verbal consent and photographs were obtained. Time-out was performed. The area was cleaned with isopropyl alcohol. 0.5ml of 1% lidocaine with 1:100,000 epinephrine was injected to obtain adequate anesthesia. A shave biopsy was performed. Hemostasis was achieved with aluminium chloride. Vaseline and a sterile dressing were applied. The patient tolerated the procedure and no complications  were noted. The patient was provided with verbal and written post care instructions.      Follow-up in 5 months for a skin check, earlier for new or changing lesions.       Staff Involved:  Scribe/Staff      Scribe Disclosure:   I, Bautista Dunham, am serving as a scribe to document services personally performed by Carmen Moffett PA-C, based on data collection and the provider's statements to me.     Provider Disclosure:   The documentation recorded by the scribe accurately reflects the services I personally performed and the decisions made by me.    All risks, benefits and alternatives were discussed with patient.  Patient is in agreement and understands the assessment and plan.  All questions were answered.    Carmen Moffett PA-C  [facility]: Phone: [phone], Fax: [phone]

## 2019-12-04 NOTE — NURSING NOTE
@Oumou Carolina's goals for this visit include:   Chief Complaint   Patient presents with     Lesion     right jawline - has been there for about 3 months and slowly growing       She requests these members of her care team be copied on today's visit information: NO    PCP: Florinda Eaton    Referring Provider:  No referring provider defined for this encounter.    LMP  (LMP Unknown)     Do you need any medication refills at today's visit? NO    Lulu Plummer CMA

## 2019-12-04 NOTE — LETTER
12/4/2019         RE: Oumou Carolina  [address] [facility] [address]        Dear Colleague,    Thank you for referring your patient, Oumou Carolina, to the [facility]. Please see a copy of my visit note below.    [facility] Dermatology Note      Dermatology Problem List:  1. Family history of melanoma (mother)  2. NMSC  -BCC left jaw line, s/p excision 8/27/2018  -BCC, central abdomen, s/p excision 11/25/2015  -BCC, nodular, left frontal scalp, s/p Mohs 2/10/15  -SCCIS, left chest, s/p excision 2/10/15  -BCC, nodular with fibrosis, right chest, s/p excision 11/4/14   3. Actinic Keratosis  -s/p PDT 2019 X3  -Pigmented AK, left chest, s/p biopsy 8/10/2018  -Previous Tx: Efudex 3/2015, cryotherapy  4. Seborrheic dermatitis  -lidex 8/2018  5. Folliculitis, central chest, s/p biopsy 8/10/2018  6. EIC vs dermatofibroma, mid central back  7. Epidermoid cyst, back, s/p excision 2/21/19     Encounter Date: Dec 4, 2019    CC:  Chief Complaint   Patient presents with     Lesion     right jawline - has been there for about 3 months and slowly growing         History of Present Illness:  Ms. Oumou Carolina is a 54 year old female who presents for a spot check. She has a lesion on the right jawline that is small but resembles her prior basal cell carcinoma on the left jawline. This has been present for 3 months. It is not tender and does not bleed or itch. She is not wearing makeup today. She has not other concerns. Most recent skin cancer was a BCC on the left jawline which was removed in 2018.    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 2-10-15     Basal cell carcinoma of left  frontal scalp (hairline) s/p mohs 2-10-15     Elevated blood pressure reading without diagnosis of hypertension     History of nonmelanoma skin cancer     Actinic keratosis     Past Medical History:   Diagnosis Date     Allergy, unspecified not elsewhere classified 2003    s/p desensitization     Anxiety      Atrial tachycardia (H)     ablation 3/24/11     Bartholin's abscess 7/09     Basal cell carcinoma of anterior chest s/p excision 11-4-14 11/4/2014     Bradycardia      Excessive or frequent menstruation 2001    s/p TVH     Transient disorder of initiating or maintaining sleep 2005     Past Surgical History:   Procedure Laterality Date     ABDOMEN SURGERY  2001    Hysterectomy     ARTHROPLASTY TOE(S)  12/2/2013    Procedure: ARTHROPLASTY TOE(S);;  Surgeon: Vinayak Palacios DPM;  Location: [facility] OR     BIOPSY  2014, 2016, 2017    skin cancer biopsies, multiple     BUNIONECTOMY  12/2/2013    Procedure: BUNIONECTOMY;  Bunionectomy, resection of arthroplasty of PIP joint right foot;  Surgeon: Vinayak Palacios DPM;  Location: [facility] OR     C LIGATE FALLOPIAN TUBE       CARDIAC SURGERY  ablation for abnormal rhythym    2013     COSMETIC SURGERY  abdomiplasty, lipo    6/2016, 4/2017     EYE SURGERY  lasik    11/2007     HC REVISE MEDIAN N/CARPAL TUNNEL SURG      bilateral carpal tunnel repair     HYSTERECTOMY, PAP NO LONGER INDICATED  2001     HYSTERECTOMY, VAGINAL  1/30/01    TVH for menorraghia     REPAIR HAMMER TOE  12/19/2011    Procedure:REPAIR HAMMER TOE; Arthroplasty PIPJ 5th toe, left; Surgeon:VINAYAK PALACIOS; Location:[facility] OR     SURGICAL HISTORY OF -   8/09    Bilat LE Laser ablation, varicose veins     SURGICAL HISTORY OF -   3/24/11    atrial ablation       Social History:  Patient reports that she has never smoked. She has never used smokeless tobacco. She reports that she does not drink alcohol or use drugs.    Family History:  Family History   Problem Relation Age of Onset     Obesity Father      Cancer Mother          Skin cancer on lip     Other Cancer Mother         melanoma, basal cell carcinoma, squamous cell skin     Hypertension No family hx of      Coronary Artery Disease No family hx of      Diabetes No family hx of      Hyperlipidemia No family hx of      Breast Cancer No family hx of      Cancer - colorectal No family hx of      Depression/Anxiety No family hx of      Cerebrovascular Disease No family hx of      Thyroid Disease No family hx of      Asthma No family hx of      Chemical Addiction No family hx of      Known Genetic Syndrome No family hx of      Osteoporosis No family hx of      Anesthesia Reaction No family hx of      Prostate Cancer No family hx of      Ovarian Cancer No family hx of        Medications:  No current outpatient medications on file.       Allergies   Allergen Reactions     No Known Drug Allergies        Review of Systems:  -As per HPI  -Constitutional: Otherwise feeling well today, in usual state of health.  -HEENT: Patient denies nonhealing oral sores.  -Heme/Lymph: no concerning bumps, no bleeding or bruising problems   -Skin: As above in HPI. No additional skin concerns.    Physical exam:  Vitals: LMP  (LMP Unknown)   GEN: This is a well developed, well-nourished female in no acute distress, in a pleasant mood.    SKIN: Focused examination of the face was performed.  -Orellana skin type: I  -3-4 mm pink papule right jawline  -No other lesions of concern on areas examined.       Impression/Plan:  1. Neoplasm of uncertain behavior on the right jawline. The differential diagnosis includes BCC vs other.   - Shave biopsy:  After discussion of benefits and risks including but not limited to bleeding/bruising, pain/swelling, infection, scar, incomplete removal, nerve damage/numbness, recurrence, and non-diagnostic biopsy, written consent, verbal consent and photographs were obtained. Time-out was performed. The area was cleaned with isopropyl alcohol. 0.5ml of 1% lidocaine with  1:100,000 epinephrine was injected to obtain adequate anesthesia. A shave biopsy was performed. Hemostasis was achieved with aluminium chloride. Vaseline and a sterile dressing were applied. The patient tolerated the procedure and no complications were noted. The patient was provided with verbal and written post care instructions.      Follow-up in 5 months for a skin check, earlier for new or changing lesions.       Staff Involved:  Scribe/Staff      Scribe Disclosure:   I, Bautista Dunham, am serving as a scribe to document services personally performed by Carmen Moffett PA-C, based on data collection and the provider's statements to me.     Provider Disclosure:   The documentation recorded by the scribe accurately reflects the services I personally performed and the decisions made by me.    All risks, benefits and alternatives were discussed with patient.  Patient is in agreement and understands the assessment and plan.  All questions were answered.    Carmen Moffett PA-C  [facility]: Phone: [phone], Fax: [phone]                Again, thank you for allowing me to participate in the care of your patient.        Sincerely,        Carmen Moffett PA-C

## 2019-12-04 NOTE — NURSING NOTE
The following medication was given:     MEDICATION:  Lidocaine with epinephrine 1% [id number]  ROUTE: [place]  SITE: see procedure note  DOSE: 0.5cc   LOT #: -[id number]  : Debora  EXPIRATION DATE: 07/01/2020  NDC#: 7125-0189-01     Was there drug waste? 1.5cc    Multi-dose vial: Yes    Gay De La Garza LPN  December 4, 2019

## 2019-12-09 LAB — COPATH REPORT: NORMAL

## 2020-02-28 ENCOUNTER — OFFICE VISIT (OUTPATIENT)
Dept: URGENT CARE | Facility: RETAIL CLINIC | Age: 55
End: 2020-02-28
Payer: COMMERCIAL

## 2020-02-28 VITALS
DIASTOLIC BLOOD PRESSURE: 83 MMHG | OXYGEN SATURATION: 100 % | TEMPERATURE: 98.8 F | HEART RATE: 77 BPM | SYSTOLIC BLOOD PRESSURE: 143 MMHG

## 2020-02-28 DIAGNOSIS — B02.9 HERPES ZOSTER WITHOUT COMPLICATION: Primary | ICD-10-CM

## 2020-02-28 PROCEDURE — 99213 OFFICE O/P EST LOW 20 MIN: CPT | Performed by: INTERNAL MEDICINE

## 2020-02-28 RX ORDER — VALACYCLOVIR HYDROCHLORIDE 1 G/1
1000 TABLET, FILM COATED ORAL 2 TIMES DAILY
Qty: 20 TABLET | Refills: 0 | Status: SHIPPED | OUTPATIENT
Start: 2020-02-28 | End: 2020-05-01

## 2020-02-28 NOTE — PROGRESS NOTES
[facility] care Progress Note        Nydia Boston MD, MPH  02/28/2020    Oumou Carolina is a pleasant 55  year old female seen for a Tingly/minimally painful nonpruritic rash involving left lower rib cage and inframammary area for 6 days.  The patient states that she did have a virtual visit online 5 days ago and did have a prescription for triamcinolone that she has been applying topically without any improvement of the rash.  There has not been any change in the diet or new detergent, soap or toiletries.  No new medications, no insect bite. No fever or chills and no recent illness. No cough or shortness of breath or wheezing is referred.  No nausea, vomiting or diarrhea.  No arthralgia or myalgia.  No tongue, lip or mouth swelling or swallowing problems are referred.    Physical Exam   BP (!) 143/83   Pulse 77   Temp 98.8  F (37.1  C) (Temporal)   LMP  (LMP Unknown)   SpO2 100%      Constitutional: Patient is in no distress The patient is pleasant and cooperative.   HEENT: Head:  Head is atraumatic, normocephalic.    Eyes: Pupils are equal, round and reactive to light and accomodation.  Sclera is non-icteric. No conjunctival injection, or exudate noted. Extraocular motion is intact. Visual acuity is intact bilaterally.  Ears:  External acoustic canals are patent and clear.  There is no erythema and bulging( exudate)  of the ( R/L ) tympanic membrane(s ).   Nose:  No Nasal congestion w/o drainage or mucosal ulceration is noted.  Throat:  Oral mucosa is moist.  No oral lesions are noted.  No posterior pharyngeal hyperemia or exudate noted.     Neck Supple.  There is no cervical lymphadenopathy.  No nuchal rigidity noted.  There is no meningismus.     Cardiovascular: Heart is regular to rate and rhythm.  No murmur is noted.     Chest. Chest Symmetrical, no soft tissues, swelling, or tenderness upon palpation   Lungs: Clear in the anterior and posterior pulmonary fields.   Abdomen: Soft  and non-tender.    Back No flank tenderness is noted.   Extremeties No edema, no calf tenderness.   Neuro: No focal deficit.   Skin No petechiae or purpura is noted.  There vesicular skin lesions involving the left lower rib cage as well as the skin of the left inframammary area as well as the lateral aspect of the left breast and left lateral aspect of the trunk involving the medial and lateral axillary line in a dermatomal fashion.  No superimposed cellulitis or lymphangitis is noted.  No pustules or exudative skin lesions are noted.  No crusty skin lesions.       Mood Normal   Other  patient was examined with a female assistant present.     Assessment & Plan     Herpes zoster without complication ( left lower rib cage and infra mammary region).    - valACYclovir (VALTREX) 1000 mg tablet; Take 1 tablet (1,000 mg) by mouth 2 times daily for 10 days  Dispense: 20 tablet; Refill: 0  Discussed potential complications of herpes zoster with the patient including possible superimposed bacterial infection in which case the patient is advised to seek medical attention immediately for management.  She expresses understanding.  Follow-up with your PCP in 4-5 days, earlier if symptoms worsen.    Please avoid direct skin contact with and infants,elderly and immunocompromised individuals.  Please keep the involved skin area covered with clothing ( avoid adhesive taping,however).  Acetaminophen 650 mg or Ibuprofen 400 mg by mouth every 6 hours as needed.  Please wash the skin with soap and water daily.

## 2020-03-11 ENCOUNTER — TELEPHONE (OUTPATIENT)
Dept: FAMILY MEDICINE | Facility: CLINIC | Age: 55
End: 2020-03-11

## 2020-03-11 NOTE — TELEPHONE ENCOUNTER
Online visit with insurance. No improvement with cream.  Then went to clinic in [place] and dx with shingles.  Was given an antiviral x 10 days.   As of today: spots still on stomach, back and arm and starting on neck.  Both sides of her body.  Question is this is shingles or not.    Itching pretty bad and rash is not going away.     advised to be seen for follow up.    Next 5 appointments (look out 90 days)    Mar 12, 2020  8:00 AM CDT  Office Visit with Stone Noyola MD  [facility] [id number] [place] [address], [address]  [facility] [address]  [phone]   May 12, 2020  9:45 AM CDT  Return Visit with Sonal Peters MD  [facility] [address] [address] [place] [id number] [phone]        Earnest Mahajan, RN, BSN

## 2020-03-11 NOTE — TELEPHONE ENCOUNTER
Left pt a detailed message. The contagious period ends when all blisters have crusted over, generally 1 to 2 weeks after the illness starts.      Chen Cervantes, MSN, RN

## 2020-03-11 NOTE — TELEPHONE ENCOUNTER
Reason for call:  Patient reporting a symptom    Symptom or request: symptom    Duration (how long have symptoms been present): 2 weeks    Have you been treated for this before? Yes    Additional comments: patient has shingles but she has questions about how long it will still spread .  Please call    Phone Number patient can be reached at:  Home number on file [phone] (home)    Best Time:  any    Can we leave a detailed message on this number:  YES    Call taken on 3/11/2020 at 8:32 AM by Risa Connelly

## 2020-03-11 NOTE — PROGRESS NOTES
Subjective     Oumou Carolina is a 55 year old female who presents to clinic today for the following health issues:    History of Present Illness        She eats 4 or more servings of fruits and vegetables daily.She consumes 0 sweetened beverage(s) daily.She exercises with enough effort to increase her heart rate 30 to 60 minutes per day.  She exercises with enough effort to increase her heart rate 6 days per week.   She is taking medications regularly.     Rash  Onset: around 2/22/2020    Description:   Location: all over  Character: round, blotchy, raised, red, flaky   Itching (Pruritis): YES    Progression of Symptoms:  worsening    Accompanying Signs & Symptoms:  Fever: no   Body aches or joint pain: YES- sore neck off and on since rash started  Sore throat symptoms: no   Recent cold symptoms: YES    History:   Previous similar rash: no     Precipitating factors:   Exposure to similar rash: no   New exposures: coconut creamer   Recent travel: no     Alleviating factors:  Hot water    Therapies Tried and outcome: cortisone cream      Patient Active Problem List   Diagnosis     Persistent disorder of initiating or maintaining sleep     Hyperlipidemia LDL goal <160     Sinus bradycardia     Hammer toe     Hx of hysterectomy     Basal cell carcinoma of anterior chest s/p excision 11-4-14     Squamous cell carcinoma in situ left chest s/p excision 2-10-15     Basal cell carcinoma of left frontal scalp (hairline) s/p mohs 2-10-15     Elevated blood pressure reading without diagnosis of hypertension     History of nonmelanoma skin cancer     Actinic keratosis     Past Surgical History:   Procedure Laterality Date     ABDOMEN SURGERY  2001    Hysterectomy     ARTHROPLASTY TOE(S)  12/2/2013    Procedure: ARTHROPLASTY TOE(S);;  Surgeon: Ab Palacios DPM;  Location: [facility]     BIOPSY  2014, 2016, 2017    skin cancer biopsies, multiple     BUNIONECTOMY  12/2/2013    Procedure: BUNIONECTOMY;  Bunionectomy, resection  of arthroplasty of PIP joint right foot;  Surgeon: Vinayak Palacios, SANA;  Location: [facility] OR     C LIGATE FALLOPIAN TUBE       CARDIAC SURGERY  ablation for abnormal rhythym    2013     COSMETIC SURGERY  abdomiplasty, lipo    6/2016, 4/2017     EYE SURGERY  lasik    11/2007     HC REVISE MEDIAN N/CARPAL TUNNEL SURG      bilateral carpal tunnel repair     HYSTERECTOMY, PAP NO LONGER INDICATED  2001     HYSTERECTOMY, VAGINAL  1/30/01    TVH for menorraghia     REPAIR HAMMER TOE  12/19/2011    Procedure:REPAIR HAMMER TOE; Arthroplasty PIPJ 5th toe, left; Surgeon:VINAYAK PALACIOS; Location:[facility]     SURGICAL HISTORY OF -   8/09    Bilat LE Laser ablation, varicose veins     SURGICAL HISTORY OF -   3/24/11    atrial ablation       Social History     Tobacco Use     Smoking status: Never Smoker     Smokeless tobacco: Never Used   Substance Use Topics     Alcohol use: No     Frequency: Never     Comment: none at all      Family History   Problem Relation Age of Onset     Obesity Father      Cancer Mother         Skin cancer on lip     Other Cancer Mother         melanoma, basal cell carcinoma, squamous cell skin     Hypertension No family hx of      Coronary Artery Disease No family hx of      Diabetes No family hx of      Hyperlipidemia No family hx of      Breast Cancer No family hx of      Cancer - colorectal No family hx of      Depression/Anxiety No family hx of      Cerebrovascular Disease No family hx of      Thyroid Disease No family hx of      Asthma No family hx of      Chemical Addiction No family hx of      Known Genetic Syndrome No family hx of      Osteoporosis No family hx of      Anesthesia Reaction No family hx of      Prostate Cancer No family hx of      Ovarian Cancer No family hx of          Current Outpatient Medications   Medication Sig Dispense Refill     hydrOXYzine (ATARAX) 10 MG tablet Take 1 tablet (10 mg) by mouth every 4 hours as needed for itching 30 tablet 0     predniSONE (DELTASONE) 50 MG  "tablet Take 1 tablet (50 mg) by mouth daily for 5 days 5 tablet 0     valACYclovir (VALTREX) 1000 mg tablet Take 1 tablet (1,000 mg) by mouth 2 times daily for 10 days 20 tablet 0     Allergies   Allergen Reactions     No Known Drug Allergies      Recent Labs   Lab Test 11/25/19  0849 09/28/17  0833 11/28/16  1145 12/03/14  0736 04/04/13  0812   * 86  --  91 109    92  --  95 77   TRIG 82 61  --  54 59   ALT  --   --   --   --  35   CR  --  0.67 0.72  --  0.79   GFRESTIMATED  --  >90 85  --  78   GFRESTBLACK  --  >90 >90  African American GFR Calc    --  >90   POTASSIUM  --  4.1 3.8  --  3.8   TSH 1.15  --   --  1.23 1.21      BP Readings from Last 3 Encounters:   03/12/20 122/72   02/28/20 (!) 143/83   11/25/19 112/86    Wt Readings from Last 3 Encounters:   03/12/20 83.9 kg (185 lb)   11/25/19 81.1 kg (178 lb 11.2 oz)   02/04/19 80.3 kg (177 lb)                    Reviewed and updated as needed this visit by Provider         Review of Systems   ROS COMP: Constitutional, HEENT, cardiovascular, pulmonary, gi and gu systems are negative, except as otherwise noted.      Objective    /72   Pulse 75   Temp 99  F (37.2  C) (Temporal)   Resp 20   Ht 1.676 m (5' 6\")   Wt 83.9 kg (185 lb)   LMP  (LMP Unknown)   SpO2 97%   BMI 29.86 kg/m    Body mass index is 29.86 kg/m .  Physical Exam   GENERAL: healthy, alert and no distress  NECK: no adenopathy, no asymmetry, masses, or scars and thyroid normal to palpation  RESP: lungs clear to auscultation - no rales, rhonchi or wheezes  CV: regular rate and rhythm, normal S1 S2, no S3 or S4, no murmur, click or rub, no peripheral edema and peripheral pulses strong  MS: no gross musculoskeletal defects noted, no edema  SKIN: Slightly erythematous ovoid macules, likely herald patch under left breast, Volant tree pattern on the back  NEURO: Normal strength and tone, mentation intact and speech normal  BACK: no CVA tenderness, no paralumbar " tenderness  PSYCH: mentation appears normal, affect normal/bright    Diagnostic Test Results:  Labs reviewed in Epic        Assessment & Plan     1. Viral exanthem  55-year-old female with generalized rash for the last 3 weeks, she has been treated for zoster, has been treated with topical steroids, with no improvement.  She had mild cold symptoms prior to onset.  I believe she has pityriasis rosea.  Recommended hydroxyzine for itching, moisturizing therapy, discussed limited course.  She would like to try steroid burst.  She has taken prednisone in the past without issue.  Follow-up no improvement or any worsening.  - predniSONE (DELTASONE) 50 MG tablet; Take 1 tablet (50 mg) by mouth daily for 5 days  Dispense: 5 tablet; Refill: 0  - hydrOXYzine (ATARAX) 10 MG tablet; Take 1 tablet (10 mg) by mouth every 4 hours as needed for itching  Dispense: 30 tablet; Refill: 0  - CBC with platelets and differential  - Treponema Abs w Reflex to RPR and Titer  - HIV Antigen Antibody Combo    2. Encounter for screening mammogram for breast cancer  Due for her mammogram-ordered.  - MA SCREENING DIGITAL BILAT - Future  (s+30); Future       Return in about 6 months (around 9/12/2020) for Annual Well Check.    Stone Noyola MD  [facility]

## 2020-03-12 ENCOUNTER — OFFICE VISIT (OUTPATIENT)
Dept: FAMILY MEDICINE | Facility: CLINIC | Age: 55
End: 2020-03-12
Payer: COMMERCIAL

## 2020-03-12 VITALS
HEIGHT: 66 IN | RESPIRATION RATE: 20 BRPM | HEART RATE: 75 BPM | WEIGHT: 185 LBS | TEMPERATURE: 99 F | OXYGEN SATURATION: 97 % | BODY MASS INDEX: 29.73 KG/M2 | SYSTOLIC BLOOD PRESSURE: 122 MMHG | DIASTOLIC BLOOD PRESSURE: 72 MMHG

## 2020-03-12 DIAGNOSIS — Z12.31 ENCOUNTER FOR SCREENING MAMMOGRAM FOR BREAST CANCER: ICD-10-CM

## 2020-03-12 DIAGNOSIS — B09 VIRAL EXANTHEM: Primary | ICD-10-CM

## 2020-03-12 LAB
BASOPHILS # BLD AUTO: 0 10E9/L (ref 0–0.2)
BASOPHILS NFR BLD AUTO: 0.4 %
DIFFERENTIAL METHOD BLD: NORMAL
EOSINOPHIL # BLD AUTO: 0.1 10E9/L (ref 0–0.7)
EOSINOPHIL NFR BLD AUTO: 2.3 %
ERYTHROCYTE [DISTWIDTH] IN BLOOD BY AUTOMATED COUNT: 13.1 % (ref 10–15)
HCT VFR BLD AUTO: 38.8 % (ref 35–47)
HGB BLD-MCNC: 12.8 G/DL (ref 11.7–15.7)
HIV 1+2 AB+HIV1 P24 AG SERPL QL IA: NONREACTIVE
LYMPHOCYTES # BLD AUTO: 1.1 10E9/L (ref 0.8–5.3)
LYMPHOCYTES NFR BLD AUTO: 24 %
MCH RBC QN AUTO: 29.7 PG (ref 26.5–33)
MCHC RBC AUTO-ENTMCNC: 33 G/DL (ref 31.5–36.5)
MCV RBC AUTO: 90 FL (ref 78–100)
MONOCYTES # BLD AUTO: 0.4 10E9/L (ref 0–1.3)
MONOCYTES NFR BLD AUTO: 7.4 %
NEUTROPHILS # BLD AUTO: 3.1 10E9/L (ref 1.6–8.3)
NEUTROPHILS NFR BLD AUTO: 65.9 %
PLATELET # BLD AUTO: 254 10E9/L (ref 150–450)
RBC # BLD AUTO: 4.31 10E12/L (ref 3.8–5.2)
WBC # BLD AUTO: 4.7 10E9/L (ref 4–11)

## 2020-03-12 PROCEDURE — 85025 COMPLETE CBC W/AUTO DIFF WBC: CPT | Performed by: FAMILY MEDICINE

## 2020-03-12 PROCEDURE — 36415 COLL VENOUS BLD VENIPUNCTURE: CPT | Performed by: FAMILY MEDICINE

## 2020-03-12 PROCEDURE — 86780 TREPONEMA PALLIDUM: CPT | Performed by: FAMILY MEDICINE

## 2020-03-12 PROCEDURE — 87389 HIV-1 AG W/HIV-1&-2 AB AG IA: CPT | Performed by: FAMILY MEDICINE

## 2020-03-12 PROCEDURE — 99214 OFFICE O/P EST MOD 30 MIN: CPT | Performed by: FAMILY MEDICINE

## 2020-03-12 RX ORDER — PREDNISONE 50 MG/1
50 TABLET ORAL DAILY
Qty: 5 TABLET | Refills: 0 | Status: SHIPPED | OUTPATIENT
Start: 2020-03-12 | End: 2020-05-01

## 2020-03-12 RX ORDER — HYDROXYZINE HYDROCHLORIDE 10 MG/1
10 TABLET, FILM COATED ORAL EVERY 4 HOURS PRN
Qty: 30 TABLET | Refills: 0 | Status: SHIPPED | OUTPATIENT
Start: 2020-03-12 | End: 2021-01-28

## 2020-03-12 ASSESSMENT — MIFFLIN-ST. JEOR: SCORE: 1455.9

## 2020-03-12 ASSESSMENT — PAIN SCALES - GENERAL: PAINLEVEL: NO PAIN (0)

## 2020-03-12 NOTE — RESULT ENCOUNTER NOTE
Oumou,  Your recent studies showed a normal complete blood count.  Please let me know if you have any questions or concerns and follow up as discussed in clinic.    Sincerely.  Dr. SIGRID Noyola MD, FAAFP  Family Medicine Physician  [facility] [place]  [address] [place] [address]

## 2020-03-12 NOTE — PATIENT INSTRUCTIONS
Patient Education     Understanding Pityriasis Rosea    Pityriasis rosea is a type of skin rash. It starts with one large round or oval scaly patch called the herald patch, and then causes many more small patches. The rash most often appears on the chest, back, and belly. It can take 1 to 2 months to go away. But once it s gone, it doesn t come back.  How to say it  [id number] RO-zee-ah   What causes pityriasis rosea?  The cause is not yet known but experts think it may be from a virus. The rash happens most often in people ages 10 to 35, and in pregnant women. If you are pregnant, make sure to tell your healthcare provider about your rash.  Symptoms of pityriasis rosea  In some people, the rash shows up 1 to 2 weeks after symptoms such as headache, sore throat, nausea, stuffy nose, and fever. The rash often starts with one large scaly patch in the shape of a Unga or oval. The patch may be pink or red if you have pale skin. It may be purple, brown, or gray if you have darker skin. It can be 1 to 2 inches wide or larger. It usually appears on the chest or back. This is called a herald or mother patch.  Smaller patches then show up in 1 to 2 weeks on the chest, back, belly, arms, and legs. It can also show up on the neck and face. The rash can form the shape of a Angelo tree on your back. The patches may itch, especially if your skin gets warmer during exercise or a hot shower. You may also feel tired and achy.  Treatment for pityriasis rosea  The rash should go away without treatment, but it can take 4 to 8 weeks or longer. Once the rash goes away, it doesn t come back.  You can treat your itching with any of these:    Corticosteroid cream or ointment. You can apply this medicine to the rash 2 to 3 times a day, for up to 3 weeks.    Calamine lotion. This is a pink, watery lotion that can help stop itching.    Antihistamine. This medicine can help reduce itching. You can put it on the skin as a cream or  take it by mouth as a pill.    Other anti-itch lotion or cream. Ask your healthcare provider about other anti-itch lotion or cream that can help relieve itching. He or she may prescribe a stronger medicine if drugstore medicine isn t helping you.  If you have severe symptoms, your healthcare provider may treat you with any of the below:    Prednisone. This is an oral steroid medicine. It can help relieve severe itching if needed.    Acyclovir. This is a type of anti-virus medicine. It may help the rash go away sooner in some people.    Ultraviolet light treatment. Exposing the skin to ultraviolet light in the first week can help lessen symptoms.  When to call your healthcare provider  Call your healthcare provider right away if you have any of these:    New symptoms    Rash that lasts for more than 3 months    Symptoms that don t get better in 1 to 2 months, or get worse   Date Last Reviewed: 5/1/2016 2000-2019 [employer]. [address] [place] [address], [place] [address]. All rights reserved. This information is not intended as a substitute for professional medical care. Always follow your healthcare professional's instructions.

## 2020-03-17 LAB — T PALLIDUM AB SER QL: NONREACTIVE

## 2020-03-17 NOTE — RESULT ENCOUNTER NOTE
Oumou,  Your recent studies were also normal.  Please let me know if you have any questions or concerns and follow up as discussed in clinic.    Sincerely.  Dr. SIGRID Noyola MD, FAAFP  Family Medicine Physician  [facility] [place]  [address] [place] [address]

## 2020-05-01 ENCOUNTER — MYC MEDICAL ADVICE (OUTPATIENT)
Dept: FAMILY MEDICINE | Facility: CLINIC | Age: 55
End: 2020-05-01

## 2020-05-01 ENCOUNTER — VIRTUAL VISIT (OUTPATIENT)
Dept: FAMILY MEDICINE | Facility: OTHER | Age: 55
End: 2020-05-01
Payer: COMMERCIAL

## 2020-05-01 DIAGNOSIS — Z20.5 CONTACT WITH AND (SUSPECTED) EXPOSURE TO VIRAL HEPATITIS: ICD-10-CM

## 2020-05-01 DIAGNOSIS — Z20.5 CONTACT WITH AND (SUSPECTED) EXPOSURE TO VIRAL HEPATITIS: Primary | ICD-10-CM

## 2020-05-01 LAB
ALBUMIN SERPL-MCNC: 4 G/DL (ref 3.4–5)
ALP SERPL-CCNC: 51 U/L (ref 40–150)
ALT SERPL W P-5'-P-CCNC: 18 U/L (ref 0–50)
AST SERPL W P-5'-P-CCNC: 17 U/L (ref 0–45)
BILIRUB DIRECT SERPL-MCNC: 0.2 MG/DL (ref 0–0.2)
BILIRUB SERPL-MCNC: 0.5 MG/DL (ref 0.2–1.3)
PROT SERPL-MCNC: 7.7 G/DL (ref 6.8–8.8)

## 2020-05-01 PROCEDURE — 87340 HEPATITIS B SURFACE AG IA: CPT | Performed by: NURSE PRACTITIONER

## 2020-05-01 PROCEDURE — 99214 OFFICE O/P EST MOD 30 MIN: CPT | Mod: 95 | Performed by: NURSE PRACTITIONER

## 2020-05-01 PROCEDURE — 36415 COLL VENOUS BLD VENIPUNCTURE: CPT | Performed by: NURSE PRACTITIONER

## 2020-05-01 PROCEDURE — 87389 HIV-1 AG W/HIV-1&-2 AB AG IA: CPT | Performed by: NURSE PRACTITIONER

## 2020-05-01 PROCEDURE — 80076 HEPATIC FUNCTION PANEL: CPT | Performed by: NURSE PRACTITIONER

## 2020-05-01 PROCEDURE — 86803 HEPATITIS C AB TEST: CPT | Performed by: NURSE PRACTITIONER

## 2020-05-01 PROCEDURE — 86704 HEP B CORE ANTIBODY TOTAL: CPT | Performed by: NURSE PRACTITIONER

## 2020-05-01 PROCEDURE — 86706 HEP B SURFACE ANTIBODY: CPT | Performed by: NURSE PRACTITIONER

## 2020-05-01 NOTE — PROGRESS NOTES
"Oumou Carolina is a 55 year old female who is being evaluated via a billable telephone visit.      The patient has been notified of following:     \"This telephone visit will be conducted via a call between you and your physician/provider. We have found that certain health care needs can be provided without the need for a physical exam.  This service lets us provide the care you need with a short phone conversation.  If a prescription is necessary we can send it directly to your pharmacy.  If lab work is needed we can place an order for that and you can then stop by our lab to have the test done at a later time.    Telephone visits are billed at different rates depending on your insurance coverage. During this emergency period, for some insurers they may be billed the same as an in-person visit.  Please reach out to your insurance provider with any questions.    If during the course of the call the physician/provider feels a telephone visit is not appropriate, you will not be charged for this service.\"    Patient has given verbal consent for Telephone visit?  Yes    How would you like to obtain your AVS? MyChart    Subjective     Oumou Carolina is a 55 year old female who presents to clinic today for the following health issues:    HPI   Discuss lab results- negative Hepatitis labs- pt. Advised.   Is filing for divorce.  is pushing the issue. She did get an apartment and will be moving. She did talk to an .     High stress worry and anxiety.    is drinking - has alcohol abuse HX on chronic pain meds and liver failure/cancer/hepatitis C.      PHQ 5/4/2020   PHQ-9 Total Score 8   Q9: Thoughts of better off dead/self-harm past 2 weeks Not at all      MICHELLE-7 SCORE 5/4/2020   Total Score 13               Patient Active Problem List   Diagnosis     Persistent disorder of initiating or maintaining sleep     Hyperlipidemia LDL goal <160     Sinus bradycardia     Hammer toe     Hx of hysterectomy "     Basal cell carcinoma of anterior chest s/p excision 11-4-14     Squamous cell carcinoma in situ left chest s/p excision 2-10-15     Basal cell carcinoma of left frontal scalp (hairline) s/p mohs 2-10-15     Elevated blood pressure reading without diagnosis of hypertension     History of nonmelanoma skin cancer     Actinic keratosis     Past Surgical History:   Procedure Laterality Date     ABDOMEN SURGERY  2001    Hysterectomy     ARTHROPLASTY TOE(S)  12/2/2013    Procedure: ARTHROPLASTY TOE(S);;  Surgeon: Vinayak Palacios DPM;  Location: [facility] OR     BIOPSY  2014, 2016, 2017    skin cancer biopsies, multiple     BUNIONECTOMY  12/2/2013    Procedure: BUNIONECTOMY;  Bunionectomy, resection of arthroplasty of PIP joint right foot;  Surgeon: Vinayak Palacios DPM;  Location:  OR     C LIGATE FALLOPIAN TUBE       CARDIAC SURGERY  ablation for abnormal rhythym    2013     COSMETIC SURGERY  abdomiplasty, lipo    6/2016, 4/2017     EYE SURGERY  lasik    11/2007     HC REVISE MEDIAN N/CARPAL TUNNEL SURG      bilateral carpal tunnel repair     HYSTERECTOMY, PAP NO LONGER INDICATED  2001     HYSTERECTOMY, VAGINAL  1/30/01    TVH for menorraghia     REPAIR HAMMER TOE  12/19/2011    Procedure:REPAIR HAMMER TOE; Arthroplasty PIPJ 5th toe, left; Surgeon:VINAYAK PALACIOS; Location: OR     SURGICAL HISTORY OF -   8/09    Bilat LE Laser ablation, varicose veins     SURGICAL HISTORY OF -   3/24/11    atrial ablation       Social History     Tobacco Use     Smoking status: Never Smoker     Smokeless tobacco: Never Used   Substance Use Topics     Alcohol use: No     Frequency: Never     Comment: none at all      Family History   Problem Relation Age of Onset     Obesity Father      Cancer Mother         Skin cancer on lip     Other Cancer Mother         melanoma, basal cell carcinoma, squamous cell skin     Other Cancer Maternal Grandmother      Other Cancer Paternal Grandfather         Pancreatic cancer     Hypertension No  family hx of      Coronary Artery Disease No family hx of      Diabetes No family hx of      Hyperlipidemia No family hx of      Breast Cancer No family hx of      Cancer - colorectal No family hx of      Depression/Anxiety No family hx of      Cerebrovascular Disease No family hx of      Thyroid Disease No family hx of      Asthma No family hx of      Chemical Addiction No family hx of      Known Genetic Syndrome No family hx of      Osteoporosis No family hx of      Anesthesia Reaction No family hx of      Prostate Cancer No family hx of      Ovarian Cancer No family hx of            Reviewed and updated as needed this visit by Provider  Tobacco  Allergies  Meds  Problems  Med Hx  Surg Hx  Fam Hx         Review of Systems   ROS COMP: Constitutional, HEENT, cardiovascular, pulmonary, gi and gu systems are negative, except as otherwise noted.       Objective   Reported vitals:  LMP  (LMP Unknown)    alert and no distress  PSYCH: Alert and oriented times 3; coherent speech, normal   rate and volume, able to articulate logical thoughts, able   to abstract reason, no tangential thoughts, no hallucinations   or delusions  Her affect is anxious and tearful  RESP: No cough, no audible wheezing, able to talk in full sentences  Remainder of exam unable to be completed due to telephone visits    Diagnostic Test Results:  Labs reviewed in Epic        Assessment/Plan:  1. Anxiety  Uncontrolled. See below.  - MENTAL HEALTH REFERRAL  - Adult; Outpatient Treatment; Individual/Couples/Family/Group Therapy/Health Psychology; [facility] [place]: [facility] [phone]; We will contact you to schedule the appointment or please call with any questions    2. Need for hepatitis C screening test  Recent normal screening.     - Hepatitis C antibody; Future    3. Screening for HIV (human immunodeficiency virus)  As above.     - HIV Antigen Antibody Combo; Future    4. Other social stressor  - as above  - MENTAL HEALTH REFERRAL   - Adult; Outpatient Treatment; Individual/Couples/Family/Group Therapy/Health Psychology; [facility] [phone]; We will contact you to schedule the appointment or please call with any questions     Future Hepatitis/HIV screening.   Hepatis B vaccine.   Offered support.   Counseling ordered.   Discussed starting meds. Pt. Wants to push off for now.    Return in about 2 weeks (around 5/18/2020) for Virtual Visit/Telephone Visit.      Phone call duration:  36 minutes    TORIBIO Steen CNP
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
## 2020-05-03 LAB
HBV CORE AB SERPL QL IA: NONREACTIVE
HBV SURFACE AB SERPL IA-ACNC: 0 M[IU]/ML
HBV SURFACE AG SERPL QL IA: NONREACTIVE
HCV AB SERPL QL IA: NONREACTIVE
HIV 1+2 AB+HIV1 P24 AG SERPL QL IA: NONREACTIVE

## 2020-05-04 ENCOUNTER — VIRTUAL VISIT (OUTPATIENT)
Dept: FAMILY MEDICINE | Facility: OTHER | Age: 55
End: 2020-05-04
Payer: COMMERCIAL

## 2020-05-04 DIAGNOSIS — Z11.4 SCREENING FOR HIV (HUMAN IMMUNODEFICIENCY VIRUS): ICD-10-CM

## 2020-05-04 DIAGNOSIS — Z11.59 NEED FOR HEPATITIS C SCREENING TEST: ICD-10-CM

## 2020-05-04 DIAGNOSIS — Z65.9 OTHER SOCIAL STRESSOR: ICD-10-CM

## 2020-05-04 DIAGNOSIS — F41.9 ANXIETY: Primary | ICD-10-CM

## 2020-05-04 PROCEDURE — 96127 BRIEF EMOTIONAL/BEHAV ASSMT: CPT | Mod: 95 | Performed by: NURSE PRACTITIONER

## 2020-05-04 PROCEDURE — 99214 OFFICE O/P EST MOD 30 MIN: CPT | Mod: 95 | Performed by: NURSE PRACTITIONER

## 2020-05-04 ASSESSMENT — ANXIETY QUESTIONNAIRES
7. FEELING AFRAID AS IF SOMETHING AWFUL MIGHT HAPPEN: SEVERAL DAYS
6. BECOMING EASILY ANNOYED OR IRRITABLE: NOT AT ALL
5. BEING SO RESTLESS THAT IT IS HARD TO SIT STILL: NOT AT ALL
1. FEELING NERVOUS, ANXIOUS, OR ON EDGE: NEARLY EVERY DAY
2. NOT BEING ABLE TO STOP OR CONTROL WORRYING: NEARLY EVERY DAY
IF YOU CHECKED OFF ANY PROBLEMS ON THIS QUESTIONNAIRE, HOW DIFFICULT HAVE THESE PROBLEMS MADE IT FOR YOU TO DO YOUR WORK, TAKE CARE OF THINGS AT HOME, OR GET ALONG WITH OTHER PEOPLE: EXTREMELY DIFFICULT
GAD7 TOTAL SCORE: 13
3. WORRYING TOO MUCH ABOUT DIFFERENT THINGS: NEARLY EVERY DAY

## 2020-05-04 ASSESSMENT — PATIENT HEALTH QUESTIONNAIRE - PHQ9
5. POOR APPETITE OR OVEREATING: NEARLY EVERY DAY
SUM OF ALL RESPONSES TO PHQ QUESTIONS 1-9: 8

## 2020-05-04 ASSESSMENT — PAIN SCALES - GENERAL: PAINLEVEL: NO PAIN (0)

## 2020-05-05 ASSESSMENT — ANXIETY QUESTIONNAIRES: GAD7 TOTAL SCORE: 13

## 2020-06-05 ENCOUNTER — TRANSFERRED RECORDS (OUTPATIENT)
Dept: HEALTH INFORMATION MANAGEMENT | Facility: CLINIC | Age: 55
End: 2020-06-05

## 2020-06-11 ENCOUNTER — TRANSFERRED RECORDS (OUTPATIENT)
Dept: HEALTH INFORMATION MANAGEMENT | Facility: CLINIC | Age: 55
End: 2020-06-11

## 2020-08-14 ENCOUNTER — TELEPHONE (OUTPATIENT)
Dept: FAMILY MEDICINE | Facility: CLINIC | Age: 55
End: 2020-08-14

## 2021-01-09 ENCOUNTER — HEALTH MAINTENANCE LETTER (OUTPATIENT)
Age: 56
End: 2021-01-09

## 2021-01-13 ENCOUNTER — TELEPHONE (OUTPATIENT)
Dept: DERMATOLOGY | Facility: CLINIC | Age: 56
End: 2021-01-13

## 2021-01-13 NOTE — TELEPHONE ENCOUNTER
Patient scheduled on Jan 19th at 10:45 am, pt aware she is an add on and it will be a wait.     Lindsey Linder LPN

## 2021-01-13 NOTE — TELEPHONE ENCOUNTER
[facility] Call Center    Phone Message    May a detailed message be left on voicemail: yes     Reason for Call: Other: Return pt of Dr Sonal Peters's is concerned because she has a growing lesion on her lip that has a discharge (not blood) She is currently scheduled on 4/9, but would like to have someone call her to discuss her symptoms, thanks!~     Action Taken: Message routed to:  Adult Clinics: Dermatology p [id number]    Travel Screening: Not Applicable

## 2021-01-13 NOTE — TELEPHONE ENCOUNTER
Pt called, no answer. VM Id's pt. Left detailed message with reason for call and  for pt to call the [facility] back at [phone]....Marybeth Allred RN

## 2021-01-19 ENCOUNTER — OFFICE VISIT (OUTPATIENT)
Dept: DERMATOLOGY | Facility: CLINIC | Age: 56
End: 2021-01-19
Payer: COMMERCIAL

## 2021-01-19 DIAGNOSIS — L57.0 AK (ACTINIC KERATOSIS): Primary | ICD-10-CM

## 2021-01-19 PROCEDURE — 99214 OFFICE O/P EST MOD 30 MIN: CPT | Performed by: DERMATOLOGY

## 2021-01-19 RX ORDER — FLUOROURACIL 50 MG/G
CREAM TOPICAL
Qty: 45 G | Refills: 0 | Status: SHIPPED | OUTPATIENT
Start: 2021-01-19 | End: 2022-01-21

## 2021-01-19 ASSESSMENT — PAIN SCALES - GENERAL: PAINLEVEL: NO PAIN (0)

## 2021-01-19 NOTE — LETTER
1/19/2021         RE: Oumou Carolina  [id number] [address]        Dear Colleague,    Thank you for referring your patient, Oumou Carolina, to the [facility]. Please see a copy of my visit note below.    [facility] Dermatology Note  Encounter Date: Jan 19, 2021  Office Visit     Dermatology Problem List:  1. Family history of melanoma (mother)  2. NMSC  -BCC left jaw line, s/p excision 8/27/2018  -BCC, central abdomen, s/p excision 11/25/2015  -BCC, nodular, left frontal scalp, s/p Mohs 2/10/15  -SCCIS, left chest, s/p excision 2/10/15  -BCC, nodular with fibrosis, right chest, s/p excision 11/4/14   3. Actinic Keratosis  -s/p PDT 2019 X3  -Pigmented AK, left chest, s/p biopsy 8/10/2018  -Previous Tx: Efudex 3/2015, cryotherapy  4. Seborrheic dermatitis  -lidex 8/2018  5. Folliculitis, central chest, s/p biopsy 8/10/2018  6. EIC vs dermatofibroma, mid central back  7. Epidermoid cyst, back, s/p excision 2/21/19     ____________________________________________    Assessment & Plan:  # Right forearm 2 mm bright red macule, likely angiomas vs ecchymosis  - Recheck in 8 weeks  - declines biopsy  # Right lateral elbow, 3 mm bright red macule, angiomas or ecchymosis    - Recheck in 8 weeks   - declines biopsy    # Actinic keratosis. Left medial cheek/nasal sidewall and upper lip philtrum/vermillion border   - Start Efudex applied daily for 2-3 weeks.  A Discussed that we would expect irritation form this medications. Recommend the patient wash hands after use or use gloves to apply. Keep medication away from pets. Avoid sun exposure to treated area. Do not occlude treated area with bandages. Follow up 4 weeks after the last application.         Procedures Performed:   None.    Follow-up: 8 week(s) in-person, or earlier for new or changing lesions. Full skin check at that time    Staff and Scribe:     Scribe Disclosure:   Sony MARTINEZ  "Shadi, am serving as a scribe to document services personally performed by this physician, Dr. Sonal Peters, based on data collection and the provider's statements to me.     Provider Disclosure:   The documentation recorded by the scribe accurately reflects the services I personally performed and the decisions made by me.    Sonal Peters MD    Department of Dermatology  [facility]: Phone: [phone], Fax:[phone]  [facility]: Phone: [phone], Fax: [phone]      ____________________________________________    CC: Derm Problem (areas of concern: right forearm X2 present for months and left upper lip present since october 2020  personal hx NMSC and family hx Melanoma-declined FBSE)      HPI:  Ms. Oumou Carolina is a(n) 55 year old female who presents today as a return patient for a growing lesion on her lip.    Last seen on 12/04/2019 by Talia Moffett PA-C when one bx was taken from the right jaw line, confirmed macular SK.    Today, she has an area of concern on the right forearm x 2 that's been present for \"a couple of months\". She also has an area on the left upper lip that she first noticed 10/2020. She has another \"rough\" area on the left bridge of the nose. She declines a FBSE.    Patient is otherwise feeling well, without additional concerns.    Labs:  NA    Physical Exam:  Vitals: LMP  (LMP Unknown)   SKIN: Focused examination of right forearm, left nasal sidewall and left upper vermilion border was performed.  - Right forearm: 2 mm bright red macule  - Right lateral elbow: 3 mm bright red macule  - There is an erythematous macule with overyling adherent scale on the left nasal sidewall, left upper vermilion border at philtrum.   - No other lesions of concern on areas examined.     Medications:  Current Outpatient Medications   Medication     hydrOXYzine (ATARAX) 10 " MG tablet     No current facility-administered medications for this visit.       Past Medical/Surgical History:   Patient Active Problem List   Diagnosis     Persistent disorder of initiating or maintaining sleep     Hyperlipidemia LDL goal <160     Sinus bradycardia     Hammer toe     Hx of hysterectomy     Basal cell carcinoma of anterior chest s/p excision 11-4-14     Squamous cell carcinoma in situ left chest s/p excision 2-10-15     Basal cell carcinoma of left frontal scalp (hairline) s/p mohs 2-10-15     Elevated blood pressure reading without diagnosis of hypertension     History of nonmelanoma skin cancer     Actinic keratosis     Past Medical History:   Diagnosis Date     Allergy, unspecified not elsewhere classified 2003    s/p desensitization     Anxiety      Atrial tachycardia (H)     ablation 3/24/11     Bartholin's abscess 7/09     Basal cell carcinoma of anterior chest s/p excision 11-4-14 11/4/2014     Bradycardia      Excessive or frequent menstruation 2001    s/p TVH     Transient disorder of initiating or maintaining sleep 2005        CC Referred Self, MD  No address on file on close of this encounter.      Again, thank you for allowing me to participate in the care of your patient.        Sincerely,        Sonal Peters MD

## 2021-01-19 NOTE — PROGRESS NOTES
[facility] Dermatology Note  Encounter Date: Jan 19, 2021  Office Visit     Dermatology Problem List:  1. Family history of melanoma (mother)  2. NMSC  -BCC left jaw line, s/p excision 8/27/2018  -BCC, central abdomen, s/p excision 11/25/2015  -BCC, nodular, left frontal scalp, s/p Mohs 2/10/15  -SCCIS, left chest, s/p excision 2/10/15  -BCC, nodular with fibrosis, right chest, s/p excision 11/4/14   3. Actinic Keratosis  -s/p PDT 2019 X3  -Pigmented AK, left chest, s/p biopsy 8/10/2018  -Previous Tx: Efudex 3/2015, cryotherapy  4. Seborrheic dermatitis  -lidex 8/2018  5. Folliculitis, central chest, s/p biopsy 8/10/2018  6. EIC vs dermatofibroma, mid central back  7. Epidermoid cyst, back, s/p excision 2/21/19     ____________________________________________    Assessment & Plan:  # Right forearm 2 mm bright red macule, likely angiomas vs ecchymosis  - Recheck in 8 weeks  - declines biopsy  # Right lateral elbow, 3 mm bright red macule, angiomas or ecchymosis    - Recheck in 8 weeks   - declines biopsy    # Actinic keratosis. Left medial cheek/nasal sidewall and upper lip philtrum/vermillion border   - Start Efudex applied daily for 2-3 weeks.  A Discussed that we would expect irritation form this medications. Recommend the patient wash hands after use or use gloves to apply. Keep medication away from pets. Avoid sun exposure to treated area. Do not occlude treated area with bandages. Follow up 4 weeks after the last application.         Procedures Performed:   None.    Follow-up: 8 week(s) in-person, or earlier for new or changing lesions. Full skin check at that time    Staff and Scribe:     Scribe Disclosure:   Sony MARTINEZ, am serving as a scribe to document services personally performed by this physician, Dr. Sonal Peters, based on data collection and the provider's statements to me.     Provider Disclosure:   The documentation recorded by the scribe accurately reflects the services  "I personally performed and the decisions made by me.    Sonal Peters MD    Department of Dermatology  [facility]: Phone: [phone], Fax:[phone]  [facility]: Phone: [phone], Fax: [phone]      ____________________________________________    CC: Derm Problem (areas of concern: right forearm X2 present for months and left upper lip present since october 2020  personal hx NMSC and family hx Melanoma-declined FBSE)      HPI:  Ms. Oumou Carolina is a(n) 55 year old female who presents today as a return patient for a growing lesion on her lip.    Last seen on 12/04/2019 by Talia Moffett PA-C when one bx was taken from the right jaw line, confirmed macular SK.    Today, she has an area of concern on the right forearm x 2 that's been present for \"a couple of months\". She also has an area on the left upper lip that she first noticed 10/2020. She has another \"rough\" area on the left bridge of the nose. She declines a FBSE.    Patient is otherwise feeling well, without additional concerns.    Labs:  NA    Physical Exam:  Vitals: LMP  (LMP Unknown)   SKIN: Focused examination of right forearm, left nasal sidewall and left upper vermilion border was performed.  - Right forearm: 2 mm bright red macule  - Right lateral elbow: 3 mm bright red macule  - There is an erythematous macule with overyling adherent scale on the left nasal sidewall, left upper vermilion border at philtrum.   - No other lesions of concern on areas examined.     Medications:  Current Outpatient Medications   Medication     hydrOXYzine (ATARAX) 10 MG tablet     No current facility-administered medications for this visit.       Past Medical/Surgical History:   Patient Active Problem List   Diagnosis     Persistent disorder of initiating or maintaining sleep     Hyperlipidemia LDL goal <160     Sinus bradycardia     " Hammer toe     Hx of hysterectomy     Basal cell carcinoma of anterior chest s/p excision 11-4-14     Squamous cell carcinoma in situ left chest s/p excision 2-10-15     Basal cell carcinoma of left frontal scalp (hairline) s/p mohs 2-10-15     Elevated blood pressure reading without diagnosis of hypertension     History of nonmelanoma skin cancer     Actinic keratosis     Past Medical History:   Diagnosis Date     Allergy, unspecified not elsewhere classified 2003    s/p desensitization     Anxiety      Atrial tachycardia (H)     ablation 3/24/11     Bartholin's abscess 7/09     Basal cell carcinoma of anterior chest s/p excision 11-4-14 11/4/2014     Bradycardia      Excessive or frequent menstruation 2001    s/p TVH     Transient disorder of initiating or maintaining sleep 2005        CC Referred Self, MD  No address on file on close of this encounter.

## 2021-01-19 NOTE — NURSING NOTE
Oumou Carolina's goals for this visit include:   Chief Complaint   Patient presents with     Derm Problem     areas of concern: right forearm X2 present for months and left upper lip present since october 2020 -declined FBSE       She requests these members of her care team be copied on today's visit information:     PCP: Florinda Eaton    Referring Provider:  Referred Self, MD  No address on file    LMP  (LMP Unknown)     Do you need any medication refills at today's visit? Veronika Mims LPN

## 2021-01-19 NOTE — PATIENT INSTRUCTIONS
Efudex Treatment    Today, you are being prescribed Fluorouracil (Efudex) a topical cream used for the treatment of Actinic Keratosis (AK's).  The medication is working to eliminate the unhealthy cells. Even though this treatment may be unattractive and somewhat uncomfortable.    Your treatment will last twice daily for 2-3 weeks or until the onset of irritation on the side of the nose/cheek and the lip.  You may experience some mild discomfort while being treated.    You will want to stop any other creams such as glycolic acid products, retin A, Tazorac, etc. to the area. You may use bland makeup/cover-up as long as it doesn't sting or cause you discomfort.    Apply the cream at night as your physician recommends. Use a cotton-tipped applicator, or use gloves if applying it with your fingertips. If applied with unprotected fingertips, it is important to wash your hands well after you apply this medicine.     Keep this medication away from pets.    We recommend avoiding excessive sun exposure to the treated area    You may use moisturizing creams over bothersome areas such as Vanicream or Cetaphil cream if the reaction becomes too bothersome. Please, call the clinic if this occurs.   Potential Side Effects    Your treated areas may be unsightly during therapy.  This will improve slowly following the discontinuation of therapy.     During the first week of application, mild inflammation may occur.     During the following weeks, redness, and swelling may occur with some crusting and burning.     Lesions resolve as the skin exfoliates.     Over 1 to 2 weeks, new skin grows into the treatment area.    Keep this medication away from pets  Specific side effects that usually do not require medical attention (report to your doctor or health care professional if they continue or are bothersome) include: Red or dark-colored skin     Mild erosion (loss of upper layer of skin)     Mild eye irritation including burning,  itching, sensitivity, stinging, or watering     Increased sensitivity of the skin to sun and ultraviolet light     Pain and burning of the affected area     Dryness, scaling or swelling of the affected area     Skin rash, itching of the affected area     Tenderness     If you have severe pain, please, call the clinic immediately and indicate that you have pain. Ask for a call from the RN.   Who should I call with questions?     [facility]: [phone]     [facility]: [phone]     For urgent needs outside of business hours call the [facility] at [phone]  and ask for the dermatology resident on call

## 2021-01-20 ENCOUNTER — TELEPHONE (OUTPATIENT)
Dept: DERMATOLOGY | Facility: CLINIC | Age: 56
End: 2021-01-20

## 2021-01-20 DIAGNOSIS — L57.0 AK (ACTINIC KERATOSIS): Primary | ICD-10-CM

## 2021-01-28 RX ORDER — IMIQUIMOD 12.5 MG/.25G
CREAM TOPICAL
Qty: 12 EACH | Refills: 0 | Status: SHIPPED | OUTPATIENT
Start: 2021-01-28 | End: 2022-01-21

## 2021-01-28 NOTE — TELEPHONE ENCOUNTER
I spoke with Oumou and reviewed [employer].  Efudex if ~$46 at [facility].  I notified her that I would still send in the Rx for Aldara, but it it's too expensive then let us know and we can send the Efudex prescription to [facility].  She verbalized understanding and agreed with the plan.    Paz Chaney RN    
[facility] Call Center    Phone Message    May a detailed message be left on voicemail: yes     Reason for Call: The patient is requesting a call to discuss if there is an alternative to fluorouracil (EFUDEX) 5 % external cream due to the out of pocket expense.  Please advise.  Thank you.     Action Taken: Message routed to:  Adult Clinics: Dermatology p [id number]    Travel Screening: Not Applicable                                                                      
Yes she could try aldara 5% cream. This comes in packets and she needs to use just small amount from the packet to treat lesions.  Same risks as efudex as reviewed with there.     Aldara 5% cream: Apply 2 times per week for 16 weeks, then stop. Then follow up. Script 12 packets which should be more than enough.   
normal...

## 2021-03-30 ENCOUNTER — TELEPHONE (OUTPATIENT)
Dept: FAMILY MEDICINE | Facility: CLINIC | Age: 56
End: 2021-03-30

## 2021-03-30 NOTE — TELEPHONE ENCOUNTER
Spoke with pt, she has mammo scheduled. Will be seeing KL in May and then will schedule physical and colonoscopy for this summer

## 2021-03-30 NOTE — TELEPHONE ENCOUNTER
Patient Quality Outreach Summary      Summary:    Patient is due/failing the following:   Physical , Mammogram and colonoscopy     Type of outreach:    Phone, left message for patient/parent to call back.    Questions for provider review:    None                                                                                                                    Yesika Rincon CMA       Chart routed to Care Team.

## 2021-04-23 ENCOUNTER — IMMUNIZATION (OUTPATIENT)
Dept: FAMILY MEDICINE | Facility: CLINIC | Age: 56
End: 2021-04-23
Payer: COMMERCIAL

## 2021-04-23 PROCEDURE — 91301 PR COVID VAC MODERNA 100 MCG/0.5 ML IM: CPT

## 2021-04-23 PROCEDURE — 0011A PR COVID VAC MODERNA 100 MCG/0.5 ML IM: CPT

## 2021-05-21 ENCOUNTER — IMMUNIZATION (OUTPATIENT)
Dept: FAMILY MEDICINE | Facility: CLINIC | Age: 56
End: 2021-05-21
Attending: FAMILY MEDICINE
Payer: COMMERCIAL

## 2021-05-21 PROCEDURE — 0012A PR COVID VAC MODERNA 100 MCG/0.5 ML IM: CPT

## 2021-05-21 PROCEDURE — 91301 PR COVID VAC MODERNA 100 MCG/0.5 ML IM: CPT

## 2021-06-06 ENCOUNTER — MYC MEDICAL ADVICE (OUTPATIENT)
Dept: FAMILY MEDICINE | Facility: CLINIC | Age: 56
End: 2021-06-06

## 2021-06-07 ENCOUNTER — OFFICE VISIT (OUTPATIENT)
Dept: URGENT CARE | Facility: URGENT CARE | Age: 56
End: 2021-06-07
Payer: COMMERCIAL

## 2021-06-07 VITALS
SYSTOLIC BLOOD PRESSURE: 165 MMHG | TEMPERATURE: 98.4 F | OXYGEN SATURATION: 97 % | BODY MASS INDEX: 31.73 KG/M2 | RESPIRATION RATE: 16 BRPM | DIASTOLIC BLOOD PRESSURE: 89 MMHG | HEART RATE: 72 BPM | WEIGHT: 196.6 LBS

## 2021-06-07 DIAGNOSIS — R03.0 ELEVATED BLOOD PRESSURE READING WITHOUT DIAGNOSIS OF HYPERTENSION: ICD-10-CM

## 2021-06-07 DIAGNOSIS — J30.89 SEASONAL ALLERGIC RHINITIS DUE TO OTHER ALLERGIC TRIGGER: ICD-10-CM

## 2021-06-07 DIAGNOSIS — J45.20 MILD INTERMITTENT ASTHMA WITH ALLERGIC RHINITIS WITHOUT COMPLICATION: Primary | ICD-10-CM

## 2021-06-07 PROCEDURE — 99214 OFFICE O/P EST MOD 30 MIN: CPT | Performed by: FAMILY MEDICINE

## 2021-06-07 RX ORDER — FLUTICASONE PROPIONATE 50 MCG
1 SPRAY, SUSPENSION (ML) NASAL 2 TIMES DAILY
Qty: 16 G | Refills: 0 | Status: SHIPPED | OUTPATIENT
Start: 2021-06-07 | End: 2023-10-30

## 2021-06-07 RX ORDER — ALBUTEROL SULFATE 90 UG/1
1-2 AEROSOL, METERED RESPIRATORY (INHALATION) EVERY 4 HOURS PRN
Qty: 18 G | Refills: 0 | Status: SHIPPED | OUTPATIENT
Start: 2021-06-07 | End: 2022-03-22

## 2021-06-07 ASSESSMENT — PAIN SCALES - GENERAL: PAINLEVEL: NO PAIN (0)

## 2021-06-07 NOTE — PATIENT INSTRUCTIONS
Start flonase 1 spray each nostril  in am and at bedtime    Albuterol as needed for wheezing    claritin 10 mg daily     Start muscinex daily 1200 mg for congestion and open up ears           Patient Education     Allergic Rhinitis  Allergic rhinitis is an allergic reaction that affects the nose, and often the eyes. It s often known as nasal allergies. Nasal allergies are often due to things in the environment that are breathed in. Depending what you are sensitive to, nasal allergies may occur only during certain seasons, or they may occur year round. Common indoor allergens include house dust mites, mold, cockroaches, and pet dander. Outdoor allergens include pollen from trees, grasses, and weeds.    Symptoms include a drippy, stuffy, and itchy nose. They also include sneezing and red and itchy eyes. You may feel tired more often. Severe allergies may also affect your breathing and trigger a condition called asthma.    Tests can be done to see what allergens are affecting you. You may be referred to an allergy specialist for testing and further evaluation.   Home care  Your healthcare provider may prescribe medicines to help relieve allergy symptoms. These may include oral medicines, nasal sprays, or eye drops.   Ask your provider for advice on how to stay away from substances that you are allergic to. Below are a few tips for each type of allergen.   Pet dander:    Do not have pets with fur and feathers.    If you have a pet, keep it out of your bedroom and off upholstered furniture.  Pollen:    When pollen counts are high, keep windows of your car and home closed. If possible, use an air conditioner instead.    Wear a filter mask when mowing or doing yard work.  House dust mites:    Wash bedding every week in warm water and detergent and dry on a hot setting.    Cover the mattress, box spring, and pillows with allergy covers.     If possible, sleep in a room with no carpet, curtains, or upholstered  furniture.  Cockroaches:    Store food in sealed containers.    Remove garbage from the home promptly.    Fix water leaks.  Mold:    Keep humidity low by using a dehumidifier or air conditioner. Keep the dehumidifier and air conditioner clean and free of mold.    Clean moldy areas with bleach and water. Don't mix bleach with other .  In general:    Vacuum once or twice a week. If possible, use a vacuum with a high-efficiency particulate air (HEPA) filter.    Don't smoke. Stay away from cigarette smoke. Cigarette smoke is an irritant that can make symptoms worse.  Follow-up care  Follow up as advised by the healthcare provider or our staff. If you were referred to an allergy specialist, make this appointment promptly.   When to seek medical advice  Call your healthcare provider or get medical care right away if the following occur:     Coughing    Fever of 100.4 F (38 C) or higher, or as directed by your healthcare provider    Raised red bumps (hives)    Continuing symptoms, new symptoms, or worsening symptoms  Call 911  Call 911 if you have:     Trouble breathing    Severe swelling of the face or severe itching of the eyes or mouth    Wheezing or shortness of breath    Chest tightness    Dizziness or lightheadedness    Feeling of doom    Stomach pain, bloating, vomiting, or diarrhea  [employer] last reviewed this educational content on 10/1/2019    3579-1507 [employer]. All rights reserved. This information is not intended as a substitute for professional medical care. Always follow your healthcare professional's instructions.           Patient Education     Asthma  What is asthma?  Asthma is a long-term (chronic) ?lung disease. The airways react to triggers (allergens and irritants). This makes it hard to breathe. With exposure to triggers, changes can occur such as:     The airways become swollen and inflamed.    The muscles around the airways tighten.    More mucus is made. This leads to  mucus plugs.  All of these changes make the airways narrow. This makes it hard for air to go out of the lungs. And fresh oxygen can't get into the body.   What causes asthma?  Experts don't know the exact cause of asthma. They believe it is partly inherited. The environment, infections, and chemicals released by the body also play a role.   Exercise causes symptoms in many people with asthma. Symptoms can occur during exercise. They can also occur right after exercise. In some people, stress or strong feelings can cause symptoms.   All of these may be asthma triggers:   Allergens Respiratory problem         Pollens (trees, grasses, and weeds)    Mold    Pets    Dust and dust mites    Cockroaches    Mice       Nasal allergies    Sinus infections    The flu    Viral infections, including the common cold   Irritants Medicines         Strong odors from perfumes, , cooking, paints, and varnishes    Chemicals (gases, fumes)    Air pollution    Changing weather (temperature, barometric pressure, humidity, and strong winds)    Smoke (tobacco-inhaled or secondhand)       Aspirin    NSAIDs (nonsteroidal anti-inflammatory drugs) such as ibuprofen   Other conditions Other         GERD (gastroesophageal reflux)    Sleep apnea    Overweight    Depression       Exercise, especially in cold weather    Strong feelings that go along with laughing or crying       Who is at risk for asthma?  It is most common in:     Children and teens ages 5 to17    People living in cities  Other factors include:    Personal or family history of asthma or allergies    Exposure to secondhand tobacco smoke    Children with a family history of asthma    Children who have allergies or atopic dermatitis    Children exposed to secondhand and tobacco smoke    Living in areas with high levels of environmental air pollution  What are the symptoms of asthma?  Symptoms include:    Trouble breathing or shortness of breath    Chest tightness    Wheezing or  a whistling sound when breathing    Coughing    Breathing becomes harder and may hurt    Talking and sleeping may be harder with severe symptoms  How is asthma diagnosed?  Your healthcare provider will ask about your health history. They will give you a physical exam. You will also have other tests. An important test is spirometry.   A spirometer is a device used to find out how well the lungs are working. It measures the amount and speed of air breathed out.   You may have other tests. These are done to check for conditions such as allergies.   How is asthma treated?  Treatment will depend on your symptoms, age, and general health. It will also depend on how severe the condition is.   There is no cure for asthma. It can often be controlled by staying away from triggers. And by taking medicines as prescribed by your healthcare provider.   Watching for symptoms and taking early, appropriate action is a key part of asthma care. So is knowing what to do if symptoms get worse. Experts advise making an Asthma Action Plan with your provider and educating your family and close friends about its purpose and content. This will help them provide correct asthma care if you are ill and can't care for yourself.   Medicines for asthma  The 2 types of asthma medicines are long-term control and short-term (quick-relief) medicines. Long-term control medicines are often taken every day. They help prevent symptoms. Quick-relief medicines calm asthma symptoms fast. But they only last for a short time. You may take either type of medicine alone. Some people take both.   Your healthcare provider should regularly check and adjust your medicines as needed.   Long-term control medicines  At first, it may take a few weeks for long-term control medicines to work. You must take these medicines every day. These medicines include:     Anti-inflammatory medicines. These medicines reduce or prevent airway swelling.    Bronchodilators. These relax  muscles around the airways.    Leukotriene modifiers. These block the action of chemicals called leukotrienes. These are chemicals that cause airways to be inflamed and narrowed.     Biologic therapy. For people with asthma that isn't well controlled despite inhaler therapy, there are some newer medicines. These target the inflammatory cells in the body that start the asthma reaction. These medicines include anti-IL4, Anti-IL5, and anti-IgE medicines. They are often given by shot (injection) or infusion.  Quick-relief medicines  Quick-relief medicines quickly relax the muscles around the airways. But the relief only lasts about 2 to 3 hours.   These medicines may include:    Inhaled short-acting beta2-agonists .  These help relax muscles around the airways.    Inhaled anticholinergics . These block a chemical in the body called acetylcholine. This chemical contracts the muscles. It also causes more mucus in the airways.  Inhalation devices for asthma  Inhaled medicines go right to the lungs. They have fewer side effects than medicines taken by mouth. Inhaled medicines may be anti-inflammatory or bronchodilating. Or they may be both. The devices used are:     Metered-dose inhaler (MDI). This is the most common type of inhaler. It uses a chemical to push the medicine out of the inhaler. MDIs are held in front of or put into the mouth. Then the medicine is released in puffs. Or they may be used with a spacer device.    Nebulizer. This device sprays a fine mist of medicine. This is done through a mask using air under pressure, or an ultrasonic machine. A mouthpiece or mask is connected to a machine by plastic tubing to deliver medicine.    Dry powder or rotary inhaler.  These inhalers deliver powered medicine as you breathe.  Living with asthma  Staying away from triggers is key in managing asthma. Triggers are specific to the individual and may include such things as allergens, irritants, other health problems,  exercise, medicines, and strong emotions. The following can help you limit your exposure:   Allergies    Dust. Dust is the most common year-round allergen. The allergy is caused by tiny dust mites. Dust mites are found in mattresses, carpets, and fabric-covered (upholstered) furniture such as sofas and chairs. They live best in warm, humid conditions. It's important to limit your exposure. Keep your living area as clean as possible by vacuuming and dusting on a weekly basis. Keep the use of carpets to a minimum, and take extra care in the bedroom. Put dust mite covers on your mattress, box spring, and pillows.    Pollens. You may be allergic to pollen. If so, during pollen season keep all car and house windows closed. Use air conditioning. If you have been outside, shower, wash your hair, and change clothes when you go inside.     Pets. Pets that have fur or feathers often cause allergies. If you have pets, try not to touch them. If you do pet or handle them, wash your hands afterward. Keep pets off your furniture, bed, and out of your bedroom. Have someone brush and bathe your pet often.    Mold and mildew.  These can trigger asthma. When outside, stay away from damp, shady areas. Use exhaust fans when cooking or bathing. Keep indoor humidity below 45%. And drain and clean your dehumidifier often.    Exercise  Exercise is a common asthma trigger. But don't limit sports or exercise unless a healthcare provider tells you to. Exercise is good for your health and lungs. Swimming, golf, and karate are good choices if you have asthma. Always warm up before exercise. And cool down after. Ask your provider about using your quick-relief medicine before starting exercise. Keep a log of what types of exercise trigger asthma problems and talk with your provider about possible ways to manage your symptoms.   Irritants  If you smoke, quit. This is hard to do, but your provider can help provide resources to aid your success.    Stay away from secondhand and thirdhand smoke. Don't let people smoke in your car or in your home.   In addition, stay away from other types of smoke. Don t use wood stoves or kerosene heaters. If you live in an area with air pollution, stay indoors during bad air days and wear a mask if you have to go outside. Also stay away from strong perfumes, cleaning products, fresh paint, and other things with strong odors.   Medicines  Some medicines can make asthma symptoms worse. These medicines include aspirin, NSAIDs (nonsteroidal anti-inflammatory drugs), and beta-blockers. Talk with your provider about your asthma history and medicine use.   Other health problems  Some health problems can make it harder to control asthma. These include:     Respiratory infections such as colds and the flu    GERD (gastroesophageal reflux) and heartburn    Being overweight    Sleep apnea    Depression    Work with your healthcare provider to treat any of these problems.   Strong emotions  The strong feelings that go with laughing and crying can trigger asthma symptoms. You can learn how to better manage your emotions.   Key points about asthma    Asthma is a long-term (chronic) lung disease.    Triggers irritate sensitive airways. This makes it hard to breathe.    Staying away from triggers is an important part of treatment.    Long-term medicines control symptoms. They are taken every day, even when you feel well.    Rescue medicines provide quick symptom relief. But they are short-term.    An Asthma Action Plan can help patients and family members take appropriate, timely steps to control symptoms.    Next steps  Tips to help you get the most from a visit to your healthcare provider:    Know the reason for your visit and what you want to happen.    Before your visit, write down questions you want answered.    Bring someone with you to help you ask questions and remember what your provider tells you.    At the visit, write down the  name of a new diagnosis, and any new medicines, treatments, or tests. Also write down any new instructions your provider gives you.    Know why a new medicine or treatment is prescribed, and how it will help you. Also know what the side effects are.    Ask if your condition can be treated in other ways.    Know why a test or procedure is recommended and what the results could mean.    Know what to expect if you do not take the medicine or have the test or procedure.    If you have a follow-up appointment, write down the date, time, and purpose for that visit.    Know how you can contact your provider if you have questions.  [employer] last reviewed this educational content on 12/1/2020 2000-2021 [employer]. All rights reserved. This information is not intended as a substitute for professional medical care. Always follow your healthcare professional's instructions.

## 2021-06-07 NOTE — PROGRESS NOTES
Patient presents with:  Allergies: Patient has been noticing in the last few days that she has been having a harder time breathing with the hotter weather- is having more drianage- would like her inhaler refilled.       Subjective     Oumou Carolina is a 56 year old female who presents to clinic today for the following health issues:    HPI   Acute Illness  Acute illness concerns: increase in wheezing, long history of allergic rhinitis  Using flonase every day not working, not taking any oral medication for allergy  Onset/Duration: 1 week ago   Symptoms:  Fever: no  Chills/Sweats: no  Headache (location?): no  Sinus Pressure: YES  Conjunctivitis:  no  Ear Pain: fullness   Rhinorrhea: no  Congestion: YES  Sore Throat: no  Cough: YES-non-productive  Wheeze: YES  Decreased Appetite: no  Nausea: no  Vomiting: no  Diarrhea: no  Dysuria/Freq.: no  Dysuria or Hematuria: no  Fatigue/Achiness: fatigue   Sick/Strep Exposure: no  Therapies tried and outcome: as noted       Patient Active Problem List   Diagnosis     Persistent disorder of initiating or maintaining sleep     Hyperlipidemia LDL goal <160     Sinus bradycardia     Hammer toe     Hx of hysterectomy     Basal cell carcinoma of anterior chest s/p excision 11-4-14     Squamous cell carcinoma in situ left chest s/p excision 2-10-15     Basal cell carcinoma of left frontal scalp (hairline) s/p mohs 2-10-15     Elevated blood pressure reading without diagnosis of hypertension     History of nonmelanoma skin cancer     Actinic keratosis     Past Surgical History:   Procedure Laterality Date     ABDOMEN SURGERY  2001    Hysterectomy     ARTHROPLASTY TOE(S)  12/2/2013    Procedure: ARTHROPLASTY TOE(S);;  Surgeon: Ab Palacios DPM;  Location: [facility] OR     BIOPSY  2014, 2016, 2017    skin cancer biopsies, multiple     BUNIONECTOMY  12/2/2013    Procedure: BUNIONECTOMY;  Bunionectomy, resection of arthroplasty of PIP joint right foot;  Surgeon: Ab Palacios DPM;   Location: [facility] OR     C LIGATE FALLOPIAN TUBE       CARDIAC SURGERY  ablation for abnormal rhythym    2013     COSMETIC SURGERY  abdomiplasty, lipo    6/2016, 4/2017     EYE SURGERY  lasik    11/2007     HC REVISE MEDIAN N/CARPAL TUNNEL SURG      bilateral carpal tunnel repair     HYSTERECTOMY, PAP NO LONGER INDICATED  2001     HYSTERECTOMY, VAGINAL  1/30/01    TVH for menorraghia     REPAIR HAMMER TOE  12/19/2011    Procedure:REPAIR HAMMER TOE; Arthroplasty PIPJ 5th toe, left; Surgeon:VINAYAK GARG; Location:[facility] OR     SURGICAL HISTORY OF -   8/09    Bilat LE Laser ablation, varicose veins     SURGICAL HISTORY OF -   3/24/11    atrial ablation       Social History     Tobacco Use     Smoking status: Never Smoker     Smokeless tobacco: Never Used   Substance Use Topics     Alcohol use: No     Frequency: Never     Comment: none at all      Family History   Problem Relation Age of Onset     Obesity Father      Cancer Mother         Skin cancer on lip     Other Cancer Mother         melanoma, basal cell carcinoma, squamous cell skin     Other Cancer Maternal Grandmother      Other Cancer Paternal Grandfather         Pancreatic cancer     Hypertension No family hx of      Coronary Artery Disease No family hx of      Diabetes No family hx of      Hyperlipidemia No family hx of      Breast Cancer No family hx of      Cancer - colorectal No family hx of      Depression/Anxiety No family hx of      Cerebrovascular Disease No family hx of      Thyroid Disease No family hx of      Asthma No family hx of      Chemical Addiction No family hx of      Known Genetic Syndrome No family hx of      Osteoporosis No family hx of      Anesthesia Reaction No family hx of      Prostate Cancer No family hx of      Ovarian Cancer No family hx of            Current Outpatient Medications   Medication Sig Dispense Refill     albuterol (PROAIR HFA) 108 (90 Base) MCG/ACT inhaler Inhale 1-2 puffs into the lungs every 4 hours as needed for  shortness of breath / dyspnea or wheezing 18 g 0     fluticasone (FLONASE) 50 MCG/ACT nasal spray Spray 1 spray into both nostrils 2 times daily 16 g 0     fluorouracil (EFUDEX) 5 % external cream For lesion on he left upper lip and left cheek (Patient not taking: Reported on 6/7/2021) 45 g 0     imiquimod (ALDARA) 5 % external cream Apply to left upper lip and left cheek twice weekly for 16 weeks (Patient not taking: Reported on 6/7/2021) 12 each 0     Allergies   Allergen Reactions     No Known Drug Allergies              ROS are negative, except as otherwise noted HPI      Objective    BP (!) 165/89   Pulse 72   Temp 98.4  F (36.9  C) (Tympanic)   Resp 16   Wt 89.2 kg (196 lb 9.6 oz)   LMP  (LMP Unknown)   SpO2 97%   BMI 31.73 kg/m    Body mass index is 31.73 kg/m .     Physical Exam       GENERAL: Pleasant and interactive.  Alert and oriented x 3.  Minimal distress.  HEENT: Normocephalic, atraumatic. PEERRLA, EOMI.  Scleras, lids and conjunctivae normal. Pinnas, canals and TM's dull, air/fluid levels bilateral.  Nose congestion and oropharynx moist and clear.  NECK: supple and free of adenopathy or masses,   CHEST:  Clear, no wheezing or rales. Normal symmetric air entry throughout both lung fields.   HEART:  S1 and S2 normal, no murmurs, clicks, gallops or rubs. Regular rate and rhythm.  No edema or JVD.  SKIN: well perfused without cyanosis or rashes.        Diagnostic Test Results:  Labs reviewed in Epic  No results found for any visits on 06/07/21.        ASSESSMENT/PLAN:      ICD-10-CM    1. Mild intermittent asthma with allergic rhinitis without complication  J45.20 albuterol (PROAIR HFA) 108 (90 Base) MCG/ACT inhaler   2. Seasonal allergic rhinitis due to other allergic trigger  J30.89 fluticasone (FLONASE) 50 MCG/ACT nasal spray   3. Elevated blood pressure reading without diagnosis of hypertension  R03.0     not on antihypertensive medications, elevated bp today, has fu pending with pcp             On the day of the encounter, time spend on chart review, patient visit, review of testing, documentation and discussion with other providers was * minutes      Patient Instructions       Start flonase 1 spray each nostril  in am and at bedtime    Albuterol as needed for wheezing    claritin 10 mg daily     Start muscinex daily 1200 mg for congestion and open up ears           Patient Education     Allergic Rhinitis  Allergic rhinitis is an allergic reaction that affects the nose, and often the eyes. It s often known as nasal allergies. Nasal allergies are often due to things in the environment that are breathed in. Depending what you are sensitive to, nasal allergies may occur only during certain seasons, or they may occur year round. Common indoor allergens include house dust mites, mold, cockroaches, and pet dander. Outdoor allergens include pollen from trees, grasses, and weeds.    Symptoms include a drippy, stuffy, and itchy nose. They also include sneezing and red and itchy eyes. You may feel tired more often. Severe allergies may also affect your breathing and trigger a condition called asthma.    Tests can be done to see what allergens are affecting you. You may be referred to an allergy specialist for testing and further evaluation.   Home care  Your healthcare provider may prescribe medicines to help relieve allergy symptoms. These may include oral medicines, nasal sprays, or eye drops.   Ask your provider for advice on how to stay away from substances that you are allergic to. Below are a few tips for each type of allergen.   Pet dander:    Do not have pets with fur and feathers.    If you have a pet, keep it out of your bedroom and off upholstered furniture.  Pollen:    When pollen counts are high, keep windows of your car and home closed. If possible, use an air conditioner instead.    Wear a filter mask when mowing or doing yard work.  House dust mites:    Wash bedding every week in warm water  and detergent and dry on a hot setting.    Cover the mattress, box spring, and pillows with allergy covers.     If possible, sleep in a room with no carpet, curtains, or upholstered furniture.  Cockroaches:    Store food in sealed containers.    Remove garbage from the home promptly.    Fix water leaks.  Mold:    Keep humidity low by using a dehumidifier or air conditioner. Keep the dehumidifier and air conditioner clean and free of mold.    Clean moldy areas with bleach and water. Don't mix bleach with other .  In general:    Vacuum once or twice a week. If possible, use a vacuum with a high-efficiency particulate air (HEPA) filter.    Don't smoke. Stay away from cigarette smoke. Cigarette smoke is an irritant that can make symptoms worse.  Follow-up care  Follow up as advised by the healthcare provider or our staff. If you were referred to an allergy specialist, make this appointment promptly.   When to seek medical advice  Call your healthcare provider or get medical care right away if the following occur:     Coughing    Fever of 100.4 F (38 C) or higher, or as directed by your healthcare provider    Raised red bumps (hives)    Continuing symptoms, new symptoms, or worsening symptoms  Call 911  Call 911 if you have:     Trouble breathing    Severe swelling of the face or severe itching of the eyes or mouth    Wheezing or shortness of breath    Chest tightness    Dizziness or lightheadedness    Feeling of doom    Stomach pain, bloating, vomiting, or diarrhea  [employer] last reviewed this educational content on 10/1/2019    9621-4434 [employer]. All rights reserved. This information is not intended as a substitute for professional medical care. Always follow your healthcare professional's instructions.           Patient Education     Asthma  What is asthma?  Asthma is a long-term (chronic) ?lung disease. The airways react to triggers (allergens and irritants). This makes it hard to breathe.  With exposure to triggers, changes can occur such as:     The airways become swollen and inflamed.    The muscles around the airways tighten.    More mucus is made. This leads to mucus plugs.  All of these changes make the airways narrow. This makes it hard for air to go out of the lungs. And fresh oxygen can't get into the body.   What causes asthma?  Experts don't know the exact cause of asthma. They believe it is partly inherited. The environment, infections, and chemicals released by the body also play a role.   Exercise causes symptoms in many people with asthma. Symptoms can occur during exercise. They can also occur right after exercise. In some people, stress or strong feelings can cause symptoms.   All of these may be asthma triggers:   Allergens Respiratory problem         Pollens (trees, grasses, and weeds)    Mold    Pets    Dust and dust mites    Cockroaches    Mice       Nasal allergies    Sinus infections    The flu    Viral infections, including the common cold   Irritants Medicines         Strong odors from perfumes, , cooking, paints, and varnishes    Chemicals (gases, fumes)    Air pollution    Changing weather (temperature, barometric pressure, humidity, and strong winds)    Smoke (tobacco-inhaled or secondhand)       Aspirin    NSAIDs (nonsteroidal anti-inflammatory drugs) such as ibuprofen   Other conditions Other         GERD (gastroesophageal reflux)    Sleep apnea    Overweight    Depression       Exercise, especially in cold weather    Strong feelings that go along with laughing or crying       Who is at risk for asthma?  It is most common in:     Children and teens ages 5 to17    People living in cities  Other factors include:    Personal or family history of asthma or allergies    Exposure to secondhand tobacco smoke    Children with a family history of asthma    Children who have allergies or atopic dermatitis    Children exposed to secondhand and tobacco smoke    Living in areas  with high levels of environmental air pollution  What are the symptoms of asthma?  Symptoms include:    Trouble breathing or shortness of breath    Chest tightness    Wheezing or a whistling sound when breathing    Coughing    Breathing becomes harder and may hurt    Talking and sleeping may be harder with severe symptoms  How is asthma diagnosed?  Your healthcare provider will ask about your health history. They will give you a physical exam. You will also have other tests. An important test is spirometry.   A spirometer is a device used to find out how well the lungs are working. It measures the amount and speed of air breathed out.   You may have other tests. These are done to check for conditions such as allergies.   How is asthma treated?  Treatment will depend on your symptoms, age, and general health. It will also depend on how severe the condition is.   There is no cure for asthma. It can often be controlled by staying away from triggers. And by taking medicines as prescribed by your healthcare provider.   Watching for symptoms and taking early, appropriate action is a key part of asthma care. So is knowing what to do if symptoms get worse. Experts advise making an Asthma Action Plan with your provider and educating your family and close friends about its purpose and content. This will help them provide correct asthma care if you are ill and can't care for yourself.   Medicines for asthma  The 2 types of asthma medicines are long-term control and short-term (quick-relief) medicines. Long-term control medicines are often taken every day. They help prevent symptoms. Quick-relief medicines calm asthma symptoms fast. But they only last for a short time. You may take either type of medicine alone. Some people take both.   Your healthcare provider should regularly check and adjust your medicines as needed.   Long-term control medicines  At first, it may take a few weeks for long-term control medicines to work. You  must take these medicines every day. These medicines include:     Anti-inflammatory medicines. These medicines reduce or prevent airway swelling.    Bronchodilators. These relax muscles around the airways.    Leukotriene modifiers. These block the action of chemicals called leukotrienes. These are chemicals that cause airways to be inflamed and narrowed.     Biologic therapy. For people with asthma that isn't well controlled despite inhaler therapy, there are some newer medicines. These target the inflammatory cells in the body that start the asthma reaction. These medicines include anti-IL4, Anti-IL5, and anti-IgE medicines. They are often given by shot (injection) or infusion.  Quick-relief medicines  Quick-relief medicines quickly relax the muscles around the airways. But the relief only lasts about 2 to 3 hours.   These medicines may include:    Inhaled short-acting beta2-agonists .  These help relax muscles around the airways.    Inhaled anticholinergics . These block a chemical in the body called acetylcholine. This chemical contracts the muscles. It also causes more mucus in the airways.  Inhalation devices for asthma  Inhaled medicines go right to the lungs. They have fewer side effects than medicines taken by mouth. Inhaled medicines may be anti-inflammatory or bronchodilating. Or they may be both. The devices used are:     Metered-dose inhaler (MDI). This is the most common type of inhaler. It uses a chemical to push the medicine out of the inhaler. MDIs are held in front of or put into the mouth. Then the medicine is released in puffs. Or they may be used with a spacer device.    Nebulizer. This device sprays a fine mist of medicine. This is done through a mask using air under pressure, or an ultrasonic machine. A mouthpiece or mask is connected to a machine by plastic tubing to deliver medicine.    Dry powder or rotary inhaler.  These inhalers deliver powered medicine as you breathe.  Living with  asthma  Staying away from triggers is key in managing asthma. Triggers are specific to the individual and may include such things as allergens, irritants, other health problems, exercise, medicines, and strong emotions. The following can help you limit your exposure:   Allergies    Dust. Dust is the most common year-round allergen. The allergy is caused by tiny dust mites. Dust mites are found in mattresses, carpets, and fabric-covered (upholstered) furniture such as sofas and chairs. They live best in warm, humid conditions. It's important to limit your exposure. Keep your living area as clean as possible by vacuuming and dusting on a weekly basis. Keep the use of carpets to a minimum, and take extra care in the bedroom. Put dust mite covers on your mattress, box spring, and pillows.    Pollens. You may be allergic to pollen. If so, during pollen season keep all car and house windows closed. Use air conditioning. If you have been outside, shower, wash your hair, and change clothes when you go inside.     Pets. Pets that have fur or feathers often cause allergies. If you have pets, try not to touch them. If you do pet or handle them, wash your hands afterward. Keep pets off your furniture, bed, and out of your bedroom. Have someone brush and bathe your pet often.    Mold and mildew.  These can trigger asthma. When outside, stay away from damp, shady areas. Use exhaust fans when cooking or bathing. Keep indoor humidity below 45%. And drain and clean your dehumidifier often.    Exercise  Exercise is a common asthma trigger. But don't limit sports or exercise unless a healthcare provider tells you to. Exercise is good for your health and lungs. Swimming, golf, and karate are good choices if you have asthma. Always warm up before exercise. And cool down after. Ask your provider about using your quick-relief medicine before starting exercise. Keep a log of what types of exercise trigger asthma problems and talk with your  provider about possible ways to manage your symptoms.   Irritants  If you smoke, quit. This is hard to do, but your provider can help provide resources to aid your success.   Stay away from secondhand and thirdhand smoke. Don't let people smoke in your car or in your home.   In addition, stay away from other types of smoke. Don t use wood stoves or kerosene heaters. If you live in an area with air pollution, stay indoors during bad air days and wear a mask if you have to go outside. Also stay away from strong perfumes, cleaning products, fresh paint, and other things with strong odors.   Medicines  Some medicines can make asthma symptoms worse. These medicines include aspirin, NSAIDs (nonsteroidal anti-inflammatory drugs), and beta-blockers. Talk with your provider about your asthma history and medicine use.   Other health problems  Some health problems can make it harder to control asthma. These include:     Respiratory infections such as colds and the flu    GERD (gastroesophageal reflux) and heartburn    Being overweight    Sleep apnea    Depression    Work with your healthcare provider to treat any of these problems.   Strong emotions  The strong feelings that go with laughing and crying can trigger asthma symptoms. You can learn how to better manage your emotions.   Key points about asthma    Asthma is a long-term (chronic) lung disease.    Triggers irritate sensitive airways. This makes it hard to breathe.    Staying away from triggers is an important part of treatment.    Long-term medicines control symptoms. They are taken every day, even when you feel well.    Rescue medicines provide quick symptom relief. But they are short-term.    An Asthma Action Plan can help patients and family members take appropriate, timely steps to control symptoms.    Next steps  Tips to help you get the most from a visit to your healthcare provider:    Know the reason for your visit and what you want to happen.    Before your  visit, write down questions you want answered.    Bring someone with you to help you ask questions and remember what your provider tells you.    At the visit, write down the name of a new diagnosis, and any new medicines, treatments, or tests. Also write down any new instructions your provider gives you.    Know why a new medicine or treatment is prescribed, and how it will help you. Also know what the side effects are.    Ask if your condition can be treated in other ways.    Know why a test or procedure is recommended and what the results could mean.    Know what to expect if you do not take the medicine or have the test or procedure.    If you have a follow-up appointment, write down the date, time, and purpose for that visit.    Know how you can contact your provider if you have questions.  [employer] last reviewed this educational content on 12/1/2020 2000-2021 [employer]. All rights reserved. This information is not intended as a substitute for professional medical care. Always follow your healthcare professional's instructions.                      Reviewed medication instructions and side effects. Follow up if experiences side effects.     I reviewed supportive care, otc meds to use if needed, expected course, and signs of concern.  Follow up as needed or if she does not improve within 2-3 day(s) or if worsens in any way.  Reviewed red flag symptoms and is to go to the ER if experiences any of these.

## 2021-06-07 NOTE — TELEPHONE ENCOUNTER
RN Triage    Patient Contact    Attempt # 1    Was call answered?  No.  Left message on voicemail with information to call me back.    [employer] message sent.    Visit advised due to symptoms.    Camelia Galloway RN on 6/7/2021 at 12:57 PM

## 2021-11-30 ENCOUNTER — OFFICE VISIT (OUTPATIENT)
Dept: DERMATOLOGY | Facility: CLINIC | Age: 56
End: 2021-11-30
Payer: COMMERCIAL

## 2021-11-30 DIAGNOSIS — Z80.8 FAMILY HISTORY OF MELANOMA: ICD-10-CM

## 2021-11-30 DIAGNOSIS — D48.5 NEOPLASM OF UNCERTAIN BEHAVIOR OF SKIN: ICD-10-CM

## 2021-11-30 DIAGNOSIS — D48.9 NEOPLASM OF UNCERTAIN BEHAVIOR: ICD-10-CM

## 2021-11-30 DIAGNOSIS — Z85.828 HISTORY OF NONMELANOMA SKIN CANCER: Primary | ICD-10-CM

## 2021-11-30 PROCEDURE — 11102 TANGNTL BX SKIN SINGLE LES: CPT | Performed by: DERMATOLOGY

## 2021-11-30 PROCEDURE — 88305 TISSUE EXAM BY PATHOLOGIST: CPT | Performed by: DERMATOLOGY

## 2021-11-30 PROCEDURE — 99213 OFFICE O/P EST LOW 20 MIN: CPT | Mod: 25 | Performed by: DERMATOLOGY

## 2021-11-30 PROCEDURE — 11103 TANGNTL BX SKIN EA SEP/ADDL: CPT | Performed by: DERMATOLOGY

## 2021-11-30 NOTE — PROGRESS NOTES
[facility] Dermatology Note  Encounter Date: Nov 30, 2021  Office Visit     Dermatology Problem List:  0. NUB x 3  - left medial cheek, bx 11/30/21  - left infraorbital, bx 11/30/21  - central chest, bx 11/30/21  1. Family history of melanoma (mother)  2. NMSC  -BCC left jaw line, s/p excision 8/27/2018  -BCC, central abdomen, s/p excision 11/25/2015  -BCC, nodular, left frontal scalp, s/p Mohs 2/10/15  -SCCIS, left chest, s/p excision 2/10/15  -BCC, nodular with fibrosis, right chest, s/p excision 11/4/14   3. Actinic Keratosis  -s/p PDT 2019 X3  -Pigmented AK, left chest, s/p biopsy 8/10/2018  -Previous Tx: Efudex 3/2015, cryotherapy  4. Seborrheic dermatitis  -lidex 8/2018  5. Folliculitis, central chest, s/p biopsy 8/10/2018  6. EIC vs dermatofibroma, mid central back  7. Epidermoid cyst, back, s/p excision 2/21/19   ____________________________________________     Assessment & Plan:    # History of NMSC. No evidence of recurrence.   - Recommend sunscreens SPF #30 or greater, protective clothing and avoidance of tanning beds.   - Recommended yearly skin exams.    # Family history of melanoma in a first degree relative (mother)  - Recommended yearly skin checks.  - Discussed appropriate sun protective measured including sunscreen with an SPF 30 or above.    # Previously noted 2 mm bright red macule, right forearm.  - Resolved.    # Previously noted 3 mm bright red macule, right lateral elbow.  - Resolved.     # Neoplasm of uncertain behavior on the left medial cheek. The differential diagnosis includes AK vs other. Unresponsive to Efudex.   - See shave biopsy procedure note below.    # Neoplasm of uncertain behavior on the left infraorbital. The differential diagnosis includes AK vs other. Unresponsive to Efudex.   - See shave biopsy procedure note below.    # Neoplasm of uncertain behavior on the central chest. The differential diagnosis includes AK vs BCC vs SCC.   - See shave biopsy  procedure note below.    # Mild erythema, left jaw line.  - If not resolved at follow up, consider punch biopsy.    Procedures Performed:     - Shave biopsy procedure note, locations: left medial cheek, left infraorbital, and central chest. After discussion of benefits and risks including but not limited to bleeding, infection, scar, incomplete removal, recurrence, and non-diagnostic biopsy, written consent and photographs were obtained. The area was cleaned with isopropyl alcohol. 0.5mL of 1% lidocaine with epinephrine was injected to obtain adequate anesthesia of lesions. Shave biopsy at sites performed. Hemostasis was achieved with aluminium chloride. Petrolatum ointment and a sterile dressing were applied. The patient tolerated the procedure and no complications were noted. The patient was provided with verbal and written post care instructions.     Follow-up: pending path results, spot check in 3-4 months of jawline, 1 year in-person for skin check, or earlier for new or changing lesions.    Staff, Medical Student and Scribe:     The patient was staffed and seen by the attending, Dr. Peters.     Nichelle Patrick, MS3    Scribe Disclosure:   I, Sofia Martinez, am serving as a scribe to document services personally performed by this physician, Dr. Sonal Peters, based on data collection and the provider's statements to me.    Provider Disclosure:   The documentation recorded by the scribe accurately reflects the services I personally performed and the decisions made by me.    Sonal Peters MD    Department of Dermatology  [facility]: Phone: [phone], Fax:[phone]  [facility]: Phone: [phone], Fax: [phone]    Staff Physician:  I was present with the medical student who participated in the service and in the documentation of the note. I have verified the history and personally  performed the physical exam and medical decision making. I agree with the assessment and plan of care as documented in the note.      Sonal Peters MD    Department of Dermatology  [facility]: Phone: [phone], Fax:[phone]  [facility]: Phone: [phone], Fax: [phone]  11/30/2021      ____________________________________________    CC: Full body skin examination    HPI:  Ms. Oumou Carolina is a(n) 56 year old female who presents today as a return patient for skin check. Family history of melanoma in mother, personal history of NMSC and AKs.     Last seen 1/19/21. Lesions were noted on the right forearm and right lateral elbow. Plan was to recheck in 8 weeks. Patient was to also start Efudex for AKs on the left medial cheek, nasal sidewall, and upper lip philtrum/vermilion border.    Today, patient states the red colored areas on her right forearm and lateral elbow have lightened in color and are no longer bothersome. However, patient states the lesions on her left medial cheek have been persistently irritated. She treated the area with Efudex for 2-3 weeks, which didn't seem to improve the areas. States they are flaky, have not bled. Not currently treating.     Patient has history of BCC left jaw line, s/p excision 8/27/2018. Patient has scar there and feels this has changed in the last year. States she feels a lump under her skin in this area. Mild tenderness and pressure; has not drained.     Lastly, patient has a new lesion on right chest. Developed a cyst-like lesion this past summer. Notes the area bled and then flattened out into a crusted lesion. Area is itchy, flaky. Has not bled since this past summer. Not currently treating.    Patient is otherwise feeling well, without additional skin concerns.    Labs Reviewed:  N/A    Physical Exam:  Vitals: LMP  (LMP Unknown)    SKIN: Full skin, which includes the head/face, both arms, chest, back, abdomen,both legs, genitalia and/or groin buttocks, digits and/or nails, was examined.  - Left medial cheek: 5mm scaly macule  - Left infraorbital: 5mm scaly macule  - Central chest: 7mm scaly white macule  - Mild erythema at the left jawline.  - Nails are painted.  - Right forearm is clear.  - Right lateral elbow is clear.  - No other lesions of concern on areas examined.     Medications:  Current Outpatient Medications   Medication     albuterol (PROAIR HFA) 108 (90 Base) MCG/ACT inhaler     fluorouracil (EFUDEX) 5 % external cream     fluticasone (FLONASE) 50 MCG/ACT nasal spray     imiquimod (ALDARA) 5 % external cream     No current facility-administered medications for this visit.      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 2-10-15     Basal cell carcinoma of left frontal scalp (hairline) s/p mohs 2-10-15     Elevated blood pressure reading without diagnosis of hypertension     History of nonmelanoma skin cancer     Actinic keratosis     Past Medical History:   Diagnosis Date     Allergy, unspecified not elsewhere classified 2003    s/p desensitization     Anxiety      Atrial tachycardia (H)     ablation 3/24/11     Bartholin's abscess 7/09     Basal cell carcinoma of anterior chest s/p excision 11-4-14 11/4/2014     Bradycardia      Excessive or frequent menstruation 2001    s/p TVH     Transient disorder of initiating or maintaining sleep 2005        CC No referring provider defined for this encounter. on close of this encounter.

## 2021-11-30 NOTE — PATIENT INSTRUCTIONS

## 2021-11-30 NOTE — LETTER
11/30/2021         RE: Oumou Carolina  [address] [facility] [address]        Dear Colleague,    Thank you for referring your patient, Oumou Carolina, to the [facility]. Please see a copy of my visit note below.    [facility] Dermatology Note  Encounter Date: Nov 30, 2021  Office Visit     Dermatology Problem List:  0. NUB x 3  - left medial cheek, bx 11/30/21  - left infraorbital, bx 11/30/21  - central chest, bx 11/30/21  1. Family history of melanoma (mother)  2. NMSC  -BCC left jaw line, s/p excision 8/27/2018  -BCC, central abdomen, s/p excision 11/25/2015  -BCC, nodular, left frontal scalp, s/p Mohs 2/10/15  -SCCIS, left chest, s/p excision 2/10/15  -BCC, nodular with fibrosis, right chest, s/p excision 11/4/14   3. Actinic Keratosis  -s/p PDT 2019 X3  -Pigmented AK, left chest, s/p biopsy 8/10/2018  -Previous Tx: Efudex 3/2015, cryotherapy  4. Seborrheic dermatitis  -lidex 8/2018  5. Folliculitis, central chest, s/p biopsy 8/10/2018  6. EIC vs dermatofibroma, mid central back  7. Epidermoid cyst, back, s/p excision 2/21/19   ____________________________________________     Assessment & Plan:    # History of NMSC. No evidence of recurrence.   - Recommend sunscreens SPF #30 or greater, protective clothing and avoidance of tanning beds.   - Recommended yearly skin exams.    # Family history of melanoma in a first degree relative (mother)  - Recommended yearly skin checks.  - Discussed appropriate sun protective measured including sunscreen with an SPF 30 or above.    # Previously noted 2 mm bright red macule, right forearm.  - Resolved.    # Previously noted 3 mm bright red macule, right lateral elbow.  - Resolved.     # Neoplasm of uncertain behavior on the left medial cheek. The differential diagnosis includes AK vs other. Unresponsive to Efudex.   - See shave biopsy procedure note below.    # Neoplasm of uncertain behavior on the left infraorbital. The  differential diagnosis includes AK vs other. Unresponsive to Efudex.   - See shave biopsy procedure note below.    # Neoplasm of uncertain behavior on the central chest. The differential diagnosis includes AK vs BCC vs SCC.   - See shave biopsy procedure note below.    # Mild erythema, left jaw line.  - If not resolved at follow up, consider punch biopsy.    Procedures Performed:     - Shave biopsy procedure note, locations: left medial cheek, left infraorbital, and central chest. After discussion of benefits and risks including but not limited to bleeding, infection, scar, incomplete removal, recurrence, and non-diagnostic biopsy, written consent and photographs were obtained. The area was cleaned with isopropyl alcohol. 0.5mL of 1% lidocaine with epinephrine was injected to obtain adequate anesthesia of lesions. Shave biopsy at sites performed. Hemostasis was achieved with aluminium chloride. Petrolatum ointment and a sterile dressing were applied. The patient tolerated the procedure and no complications were noted. The patient was provided with verbal and written post care instructions.     Follow-up: pending path results, spot check in 3-4 months of jawline, 1 year in-person for skin check, or earlier for new or changing lesions.    Staff, Medical Student and Scribe:     The patient was staffed and seen by the attending, Dr. Peters.     Nichelle Patrick, MS3    Scribe Disclosure:   I, Sofia Martinez, am serving as a scribe to document services personally performed by this physician, Dr. Sonal Peters, based on data collection and the provider's statements to me.    Provider Disclosure:   The documentation recorded by the scribe accurately reflects the services I personally performed and the decisions made by me.    Sonal Peters MD    Department of Dermatology  [facility]: Phone: [phone], Fax:[phone]  [facility]: Phone: [phone], Fax: [phone]    Staff Physician:  I was present with the medical student who participated in the service and in the documentation of the note. I have verified the history and personally performed the physical exam and medical decision making. I agree with the assessment and plan of care as documented in the note.      Sonal Peters MD    Department of Dermatology  [facility]: Phone: [phone], Fax:[phone]  [facility]: Phone: [phone], Fax: [phone]  11/30/2021      ____________________________________________    CC: Full body skin examination    HPI:  Ms. Oumou Carolina is a(n) 56 year old female who presents today as a return patient for skin check. Family history of melanoma in mother, personal history of NMSC and AKs.     Last seen 1/19/21. Lesions were noted on the right forearm and right lateral elbow. Plan was to recheck in 8 weeks. Patient was to also start Efudex for AKs on the left medial cheek, nasal sidewall, and upper lip philtrum/vermilion border.    Today, patient states the red colored areas on her right forearm and lateral elbow have lightened in color and are no longer bothersome. However, patient states the lesions on her left medial cheek have been persistently irritated. She treated the area with Efudex for 2-3 weeks, which didn't seem to improve the areas. States they are flaky, have not bled. Not currently treating.     Patient has history of BCC left jaw line, s/p excision 8/27/2018. Patient has scar there and feels this has changed in the last year. States she feels a lump under her skin in this area. Mild tenderness and pressure; has not drained.     Lastly, patient has a new lesion on right chest. Developed a cyst-like lesion this past summer. Notes the area bled and then flattened out  into a crusted lesion. Area is itchy, flaky. Has not bled since this past summer. Not currently treating.    Patient is otherwise feeling well, without additional skin concerns.    Labs Reviewed:  N/A    Physical Exam:  Vitals: LMP  (LMP Unknown)   SKIN: Full skin, which includes the head/face, both arms, chest, back, abdomen,both legs, genitalia and/or groin buttocks, digits and/or nails, was examined.  - Left medial cheek: 5mm scaly macule  - Left infraorbital: 5mm scaly macule  - Central chest: 7mm scaly white macule  - Mild erythema at the left jawline.  - Nails are painted.  - Right forearm is clear.  - Right lateral elbow is clear.  - No other lesions of concern on areas examined.     Medications:  Current Outpatient Medications   Medication     albuterol (PROAIR HFA) 108 (90 Base) MCG/ACT inhaler     fluorouracil (EFUDEX) 5 % external cream     fluticasone (FLONASE) 50 MCG/ACT nasal spray     imiquimod (ALDARA) 5 % external cream     No current facility-administered medications for this visit.      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 2-10-15     Basal cell carcinoma of left frontal scalp (hairline) s/p mohs 2-10-15     Elevated blood pressure reading without diagnosis of hypertension     History of nonmelanoma skin cancer     Actinic keratosis     Past Medical History:   Diagnosis Date     Allergy, unspecified not elsewhere classified 2003    s/p desensitization     Anxiety      Atrial tachycardia (H)     ablation 3/24/11     Bartholin's abscess 7/09     Basal cell carcinoma of anterior chest s/p excision 11-4-14 11/4/2014     Bradycardia      Excessive or frequent menstruation 2001    s/p TVH     Transient  disorder of initiating or maintaining sleep 2005         No referring provider defined for this encounter. on close of this encounter.      Again, thank you for allowing me to participate in the care of your patient.        Sincerely,        Soanl Peters MD

## 2021-11-30 NOTE — NURSING NOTE
Oumou Carolina's goals for this visit include:   Chief Complaint   Patient presents with     Skin Check     fbsc/ spot check/ hx of nmsc     She requests these members of her care team be copied on today's visit information:     PCP: Florinda Eaton    Referring Provider:  No referring provider defined for this encounter.    LMP  (LMP Unknown)     Do you need any medication refills at today's visit? No    Saranya Ordoñez CMA on 11/30/2021 at 8:14 AM

## 2021-11-30 NOTE — NURSING NOTE
The following medication was given:     MEDICATION:  Lidocaine with epinephrine 1% [id number]  ROUTE: SQ  SITE: see procedure note  DOSE: 1ml  LOT #: -[id number]  : Debora  EXPIRATION DATE: 6.1.2022  NDC#: 7529-8647-10  Was there drug waste? 1ml  Multi-dose vial: Yes    Saranya Ordoñez, CASSI on 11/30/2021 at 9:24 AM

## 2021-12-02 LAB
PATH REPORT.COMMENTS IMP SPEC: NORMAL
PATH REPORT.FINAL DX SPEC: NORMAL
PATH REPORT.GROSS SPEC: NORMAL
PATH REPORT.MICROSCOPIC SPEC OTHER STN: NORMAL
PATH REPORT.RELEVANT HX SPEC: NORMAL

## 2021-12-06 ENCOUNTER — TELEPHONE (OUTPATIENT)
Dept: DERMATOLOGY | Facility: CLINIC | Age: 56
End: 2021-12-06
Payer: COMMERCIAL

## 2021-12-06 NOTE — TELEPHONE ENCOUNTER
Mary Cerda   12/6/2021  9:51 AM CST Back to Top        Spoke with patient and scheduled appointment.         Mary Cerda on 12/6/2021 at 9:51 AM    Sonal Peters MD   12/3/2021  6:25 PM CST         Please, dble book this for cryo only on the face and chest, 3 aks, I sent her a letter. Dble book with hair injections or wart cryo       Dear Oumou Carolina,     We are writing to inform you of your test results that show precancers. We can freeze these within 3 months. I will have nurses reach out but if they do not call to schedule please call us     Thank you for taking the time to be seen in our dermatology clinic. If you have further questions or concerns, please contact the clinic(see phone number listed below).        Sincerely,     Sonal Peters MD    Department of Dermatology  [facility]: Phone: [phone], Fax:[phone]  [facility]: Phone: [phone], Fax: [phone]       Mary Cerda on 12/6/2021 at 9:52 AM

## 2022-01-20 NOTE — PROGRESS NOTES
[facility] Dermatology Note  Encounter Date: Jan 21, 2022  Office Visit     Dermatology Problem List:  Last skin check 11/30/21  1. Family history of melanoma (mother)  2. NMSC  -BCC left jaw line, s/p excision 8/27/2018  -BCC, central abdomen, s/p excision 11/25/2015  -BCC, nodular, left frontal scalp, s/p Mohs 2/10/15  -SCCIS, left chest, s/p excision 2/10/15  -BCC, nodular with fibrosis, right chest, s/p excision 11/4/14   3. Actinic Keratosis - s/p cryotherapy, PDT 2019 x 3, Efudex 3/2015  - HAK, left medial cheek, s/p bx 11/30/21, s/p cryo then efudex  - AK, left infraorbital, s/p bx 11/30/21, resolved  - Pigmented AK, central chest, s/p bx 11/30/21, s/p cryo 1/21/22  - Pigmented AK, left chest, s/p bx 8/10/18  4. Seborrheic dermatitis  -lidex 8/2018  5. Folliculitis, central chest, s/p biopsy 8/10/2018  6. EIC vs dermatofibroma, mid central back  7. Epidermoid cyst, back, s/p excision 2/21/19   ____________________________________________    Assessment & Plan:    # Biopsy proven HAK, left medial cheek.  - See cryotherapy procedure note below.  - we will then treat with efudex at follow up    # Biopsy proven AK, left infraorbital.  - Resolved.    # Biopsy proven pigmented AK, central chest.  - Resolved.    # Actinic keratoses, right infraorbital cheek and right cheek x 6.  - See cryotherapy procedure note below.    # Personal history of NMSC and family history of Melanoma.  - Due for next skin check in May 2022.    Procedures Performed:   - Cryotherapy procedure note, locations: central chest, left medial cheek, right infraorbital, and right cheek. After verbal consent and discussion of risks and benefits including, but not limited to, dyspigmentation/scar, blister, and pain, 10 AKs were treated with 1-2 mm freeze border for 1-2 cycles with liquid nitrogen. Post cryotherapy instructions were provided.    Follow-up: 6 weeks virtually for Efudex, May 2022 for skin check, sooner for  concerns.    Staff and Scribe:     Scribe Disclosure:   I, Sofia Martinez, am serving as a scribe to document services personally performed by this physician, Dr. Sonal Peters, based on data collection and the provider's statements to me.    Provider Disclosure:   The documentation recorded by the scribe accurately reflects the services I personally performed and the decisions made by me.    Sonal Peters MD    Department of Dermatology  [facility]: Phone: [phone], Fax:[phone]  [facility]: Phone: [phone], Fax: [phone]      ____________________________________________    CC: Derm Problem (Oumou is here today for AK treatment )    HPI:  Ms. Oumou Carolina is a(n) 56 year old female who presents today as a return patient for spot check.    Last seen 11/30/21 for skin check. Biopsies determined a HAK on the left medial cheek, AK on the left infraorbital, and a pigmented AK on the central chest.    Today, Oumou notes the lesion on the left medial cheek is still present and crusty.    Patient is otherwise feeling well, without additional skin concerns.    Labs Reviewed:  Derm path from 11/30/21 reviewed.    Final Diagnosis  A(1). Left medial cheek:  - Hypertrophic actinic keratosis, base not seen - (see comment)  B(2). Left infraorbital:  - Actinic keratosis -  C(3). Central chest:  - Pigmented actinic keratosis, base not seen -    Physical Exam:  Vitals: LMP  (LMP Unknown)   SKIN: Focused examination of face and chest was performed.  - There is an erythematous macules with overyling adherent scale on the left medial cheek x 1 and right cheek x 4, and right infraorbital x 2.  - Left infraorbital is clear.  - Well healing scar with faint scale on the central chest.  - No other lesions of concern on areas examined.     Medications:  Current Outpatient Medications    Medication     albuterol (PROAIR HFA) 108 (90 Base) MCG/ACT inhaler     fluticasone (FLONASE) 50 MCG/ACT nasal spray     fluorouracil (EFUDEX) 5 % external cream     imiquimod (ALDARA) 5 % external cream     No current facility-administered medications for this visit.      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 2-10-15     Basal cell carcinoma of left frontal scalp (hairline) s/p mohs 2-10-15     Elevated blood pressure reading without diagnosis of hypertension     History of nonmelanoma skin cancer     Actinic keratosis     Past Medical History:   Diagnosis Date     Allergy, unspecified not elsewhere classified 2003    s/p desensitization     Anxiety      Atrial tachycardia (H)     ablation 3/24/11     Bartholin's abscess 7/09     Basal cell carcinoma of anterior chest s/p excision 11-4-14 11/4/2014     Bradycardia      Excessive or frequent menstruation 2001    s/p TVH     Transient disorder of initiating or maintaining sleep 2005        CC No referring provider defined for this encounter. on close of this encounter.

## 2022-01-21 ENCOUNTER — OFFICE VISIT (OUTPATIENT)
Dept: DERMATOLOGY | Facility: CLINIC | Age: 57
End: 2022-01-21
Payer: COMMERCIAL

## 2022-01-21 DIAGNOSIS — L57.0 ACTINIC KERATOSIS: Primary | ICD-10-CM

## 2022-01-21 PROCEDURE — 17000 DESTRUCT PREMALG LESION: CPT | Performed by: DERMATOLOGY

## 2022-01-21 PROCEDURE — 17003 DESTRUCT PREMALG LES 2-14: CPT | Performed by: DERMATOLOGY

## 2022-01-21 RX ORDER — FLUOROURACIL 50 MG/G
CREAM TOPICAL 2 TIMES DAILY
Qty: 40 G | Refills: 0 | Status: CANCELLED | OUTPATIENT
Start: 2022-01-21

## 2022-01-21 ASSESSMENT — PAIN SCALES - GENERAL: PAINLEVEL: NO PAIN (0)

## 2022-01-21 NOTE — LETTER
1/21/2022         RE: uOmou Carolina  [address] [facility] [address]        Dear Colleague,    Thank you for referring your patient, Oumou Carolina, to the [facility]. Please see a copy of my visit note below.    [facility] Dermatology Note  Encounter Date: Jan 21, 2022  Office Visit     Dermatology Problem List:  Last skin check 11/30/21  1. Family history of melanoma (mother)  2. NMSC  -BCC left jaw line, s/p excision 8/27/2018  -BCC, central abdomen, s/p excision 11/25/2015  -BCC, nodular, left frontal scalp, s/p Mohs 2/10/15  -SCCIS, left chest, s/p excision 2/10/15  -BCC, nodular with fibrosis, right chest, s/p excision 11/4/14   3. Actinic Keratosis - s/p cryotherapy, PDT 2019 x 3, Efudex 3/2015  - HAK, left medial cheek, s/p bx 11/30/21, s/p cryo then efudex  - AK, left infraorbital, s/p bx 11/30/21, resolved  - Pigmented AK, central chest, s/p bx 11/30/21, s/p cryo 1/21/22  - Pigmented AK, left chest, s/p bx 8/10/18  4. Seborrheic dermatitis  -lidex 8/2018  5. Folliculitis, central chest, s/p biopsy 8/10/2018  6. EIC vs dermatofibroma, mid central back  7. Epidermoid cyst, back, s/p excision 2/21/19   ____________________________________________    Assessment & Plan:    # Biopsy proven HAK, left medial cheek.  - See cryotherapy procedure note below.  - we will then treat with efudex at follow up    # Biopsy proven AK, left infraorbital.  - Resolved.    # Biopsy proven pigmented AK, central chest.  - Resolved.    # Actinic keratoses, right infraorbital cheek and right cheek x 6.  - See cryotherapy procedure note below.    # Personal history of NMSC and family history of Melanoma.  - Due for next skin check in May 2022.    Procedures Performed:   - Cryotherapy procedure note, locations: central chest, left medial cheek, right infraorbital, and right cheek. After verbal consent and discussion of risks and benefits including, but not limited to,  dyspigmentation/scar, blister, and pain, 10 AKs were treated with 1-2 mm freeze border for 1-2 cycles with liquid nitrogen. Post cryotherapy instructions were provided.    Follow-up: 6 weeks virtually for Efudex, May 2022 for skin check, sooner for concerns.    Staff and Scribe:     Scribe Disclosure:   I, Sofia Martinez, am serving as a scribe to document services personally performed by this physician, Dr. Sonal Peters, based on data collection and the provider's statements to me.    Provider Disclosure:   The documentation recorded by the scribe accurately reflects the services I personally performed and the decisions made by me.    Sonal Peters MD    Department of Dermatology  [facility]: Phone: [phone], Fax:[phone]  [facility]: Phone: [phone], Fax: [phone]      ____________________________________________    CC: Derm Problem (Oumou is here today for AK treatment )    HPI:  Ms. Oumou Carolina is a(n) 56 year old female who presents today as a return patient for spot check.    Last seen 11/30/21 for skin check. Biopsies determined a HAK on the left medial cheek, AK on the left infraorbital, and a pigmented AK on the central chest.    Today, Oumou notes the lesion on the left medial cheek is still present and crusty.    Patient is otherwise feeling well, without additional skin concerns.    Labs Reviewed:  Derm path from 11/30/21 reviewed.    Final Diagnosis  A(1). Left medial cheek:  - Hypertrophic actinic keratosis, base not seen - (see comment)  B(2). Left infraorbital:  - Actinic keratosis -  C(3). Central chest:  - Pigmented actinic keratosis, base not seen -    Physical Exam:  Vitals: LMP  (LMP Unknown)   SKIN: Focused examination of face and chest was performed.  - There is an erythematous macules with overyling adherent scale on the left medial cheek x  1 and right cheek x 4, and right infraorbital x 2.  - Left infraorbital is clear.  - Well healing scar with faint scale on the central chest.  - No other lesions of concern on areas examined.     Medications:  Current Outpatient Medications   Medication     albuterol (PROAIR HFA) 108 (90 Base) MCG/ACT inhaler     fluticasone (FLONASE) 50 MCG/ACT nasal spray     fluorouracil (EFUDEX) 5 % external cream     imiquimod (ALDARA) 5 % external cream     No current facility-administered medications for this visit.      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 2-10-15     Basal cell carcinoma of left frontal scalp (hairline) s/p mohs 2-10-15     Elevated blood pressure reading without diagnosis of hypertension     History of nonmelanoma skin cancer     Actinic keratosis     Past Medical History:   Diagnosis Date     Allergy, unspecified not elsewhere classified 2003    s/p desensitization     Anxiety      Atrial tachycardia (H)     ablation 3/24/11     Bartholin's abscess 7/09     Basal cell carcinoma of anterior chest s/p excision 11-4-14 11/4/2014     Bradycardia      Excessive or frequent menstruation 2001    s/p TVH     Transient disorder of initiating or maintaining sleep 2005        CC No referring provider defined for this encounter. on close of this encounter.      Again, thank you for allowing me to participate in the care of your patient.        Sincerely,        Sonal Peters MD

## 2022-01-21 NOTE — NURSING NOTE
Oumou Carolina's goals for this visit include:   Chief Complaint   Patient presents with     Derm Problem     Oumou is here today for AK treatment        She requests these members of her care team be copied on today's visit information:     PCP: Florinda Eaton    Referring Provider:  No referring provider defined for this encounter.    LMP  (LMP Unknown)     Do you need any medication refills at today's visit? No    Lindsey Linder LPN

## 2022-01-21 NOTE — PATIENT INSTRUCTIONS
Cryotherapy    What is it?    Use of a very cold liquid, such as liquid nitrogen, to freeze and destroy abnormal skin cells that need to be removed    What should I expect?    Tenderness and redness    A small blister that might grow and fill with dark purple blood. There may be crusting.    More than one treatment may be needed if the lesions do not go away.    How do I care for the treated area?    Gently wash the area with your hands when bathing.    Use a thin layer of Vaseline to help with healing. You may use a Band-Aid.     The area should heal within 7-10 days and may leave behind a pink or lighter color.     Do not use an antibiotic or Neosporin ointment.     You may take acetaminophen (Tylenol) for pain.     Call your doctor if you have:    Severe pain    Signs of infection (warmth, redness, cloudy yellow drainage, and or a bad smell)    Questions or concerns    Who should I call with questions?       [facility]: [phone]       [facility]: [phone]       For urgent needs outside of business hours call the [facility] at [phone] and ask for the dermatology resident on call

## 2022-02-12 ENCOUNTER — HEALTH MAINTENANCE LETTER (OUTPATIENT)
Age: 57
End: 2022-02-12

## 2022-03-03 ENCOUNTER — VIRTUAL VISIT (OUTPATIENT)
Dept: DERMATOLOGY | Facility: CLINIC | Age: 57
End: 2022-03-03
Payer: COMMERCIAL

## 2022-03-03 DIAGNOSIS — L73.9 FOLLICULITIS: Primary | ICD-10-CM

## 2022-03-03 DIAGNOSIS — L57.0 ACTINIC KERATOSIS: ICD-10-CM

## 2022-03-03 PROCEDURE — 99214 OFFICE O/P EST MOD 30 MIN: CPT | Mod: TEL | Performed by: DERMATOLOGY

## 2022-03-03 RX ORDER — CLINDAMYCIN PHOSPHATE 10 UG/ML
LOTION TOPICAL
Qty: 60 ML | Refills: 11 | Status: SHIPPED | OUTPATIENT
Start: 2022-03-03 | End: 2022-05-03

## 2022-03-03 RX ORDER — FLUOROURACIL 50 MG/G
CREAM TOPICAL
Qty: 40 G | Refills: 0 | Status: SHIPPED | OUTPATIENT
Start: 2022-03-03 | End: 2022-05-03

## 2022-03-03 NOTE — PROGRESS NOTES
[facility] Dermatology Note  Encounter Date: Mar 3, 2022  Store-and-Forward and Telephone ([phone]). Location of teledermatologist: [facility].  Start time: 8:51am. End time: 8:55am.    Dermatology Problem List:  Last skin check 11/30/21  1. Family history of melanoma (mother)  2. NMSC  -BCC left jaw line, s/p excision 8/27/2018  -BCC, central abdomen, s/p excision 11/25/2015  -BCC, nodular, left frontal scalp, s/p Mohs 2/10/15  -SCCIS, left chest, s/p excision 2/10/15  -BCC, nodular with fibrosis, right chest, s/p excision 11/4/14   3. Actinic Keratosis - s/p cryotherapy, PDT 2019 x 3, Efudex 3/2015  - HAK, left medial cheek, s/p bx 11/30/21, s/p cryo then efudex  - AK, left infraorbital, s/p bx 11/30/21, resolved  - Pigmented AK, central chest, s/p bx 11/30/21, s/p cryo 1/21/22  - Pigmented AK, left chest, s/p bx 8/10/18  4. Seborrheic dermatitis  -lidex 8/2018  5. Folliculitis, central chest, s/p biopsy 8/10/2018  6. EIC vs dermatofibroma, mid central back  7. Epidermoid cyst, back, s/p excision 2/21/19   SHX- past away 12/2021  ____________________________________________     Assessment & Plan:     # Biopsy proven HAK, left medial cheek- has not had cryo, efudex start today  - After biopsy site has healed, start Efudex twice daily for 2-3 weeks or until the onset of irritation. Discussed that we would expect irritation form this medications. Recommend the patient wash hands after use or use gloves to apply. Keep medication away from pets. Avoid sun exposure to treated area. Do not occlude treated area with bandages. Follow up 4 weeks after the last application.        # Personal history of NMSC and family history of Melanoma.  - Due for next skin check Summer 2022    Procedures Performed:    None    Follow-up: 4 months to re-check the face    Staff:     Sonal Peters MD    Department of Dermatology  [facility]  [facility]: Phone: [phone], Fax:[phone]  [facility]: Phone: [phone], Fax: [phone]      ____________________________________________    CC: Derm Problem (discuss starting efudex)    HPI:  Ms. Oumou Carolina is a(n) 57 year old female who presents today as a return patient for AKs on face to start efudex as she had cryo at last visit. Also new rash X 5 days. She wonders if this is a detergent. Rash was itchy. Rash is going away. Put cortisone on it and it is resolving.     Reports nose might still be rough     past away    Patient is otherwise feeling well, without additional skin concerns.    Labs Reviewed:  NA    Physical Exam:  Vitals: LMP  (LMP Unknown)   SKIN: Teledermatology photos were reviewed; image quality and interpretability:acceptable. Image date: today  - erythematous papules and possibly one pustules on chest  -faint erythematous macules on the cheeks  - No other lesions of concern on areas examined.     Medications:  Current Outpatient Medications   Medication     albuterol (PROAIR HFA) 108 (90 Base) MCG/ACT inhaler     fluticasone (FLONASE) 50 MCG/ACT nasal spray     No current facility-administered medications for this visit.      Past Medical/Surgical History:   Patient Active Problem List   Diagnosis     Persistent disorder of initiating or maintaining sleep     Hyperlipidemia LDL goal <160     Sinus bradycardia     Hammer toe     Hx of hysterectomy     Basal cell carcinoma of anterior chest s/p excision 11-4-14     Squamous cell carcinoma in situ left chest s/p excision 2-10-15     Basal cell carcinoma of left frontal scalp (hairline) s/p mohs 2-10-15     Elevated blood pressure reading without diagnosis of hypertension     History of nonmelanoma skin cancer     Actinic keratosis     Past Medical History:   Diagnosis Date     Allergy, unspecified not elsewhere classified 2003    s/p  desensitization     Anxiety      Atrial tachycardia (H)     ablation 3/24/11     Bartholin's abscess 7/09     Basal cell carcinoma of anterior chest s/p excision 11-4-14 11/4/2014     Bradycardia      Excessive or frequent menstruation 2001    s/p TVH     Transient disorder of initiating or maintaining sleep 2005       CC No referring provider defined for this encounter. on close of this encounter.

## 2022-03-03 NOTE — NURSING NOTE
Teledermatology Nurse Call Patients:   Chief Complaint   Patient presents with     Derm Problem     discuss starting efudex     Are you in the [facility] at the time of the encounter? yes    Today's visit will be billed to you and your insurance.    A teledermatology visit is not as thorough as an in-person visit and the quality of the photograph sent may not be of the same quality as that taken by the dermatology clinic.  Saranya Ordoñez, CASSI on 3/3/2022 at 8:14 AM

## 2022-03-03 NOTE — LETTER
3/3/2022         RE: Oumou Carolina  [address] [facility] [address]        Dear Colleague,    Thank you for referring your patient, Oumou Carolina, to the [facility]. Please see a copy of my visit note below.    [facility] Dermatology Note  Encounter Date: Mar 3, 2022  Store-and-Forward and Telephone ([phone]). Location of teledermatologist: [facility].  Start time: 8:51am. End time: 8:55am.    Dermatology Problem List:  Last skin check 11/30/21  1. Family history of melanoma (mother)  2. NMSC  -BCC left jaw line, s/p excision 8/27/2018  -BCC, central abdomen, s/p excision 11/25/2015  -BCC, nodular, left frontal scalp, s/p Mohs 2/10/15  -SCCIS, left chest, s/p excision 2/10/15  -BCC, nodular with fibrosis, right chest, s/p excision 11/4/14   3. Actinic Keratosis - s/p cryotherapy, PDT 2019 x 3, Efudex 3/2015  - HAK, left medial cheek, s/p bx 11/30/21, s/p cryo then efudex  - AK, left infraorbital, s/p bx 11/30/21, resolved  - Pigmented AK, central chest, s/p bx 11/30/21, s/p cryo 1/21/22  - Pigmented AK, left chest, s/p bx 8/10/18  4. Seborrheic dermatitis  -lidex 8/2018  5. Folliculitis, central chest, s/p biopsy 8/10/2018  6. EIC vs dermatofibroma, mid central back  7. Epidermoid cyst, back, s/p excision 2/21/19   SHX- past away 12/2021  ____________________________________________     Assessment & Plan:     # Biopsy proven HAK, left medial cheek- has not had cryo, efudex start today  - After biopsy site has healed, start Efudex twice daily for 2-3 weeks or until the onset of irritation. Discussed that we would expect irritation form this medications. Recommend the patient wash hands after use or use gloves to apply. Keep medication away from pets. Avoid sun exposure to treated area. Do not occlude treated area with bandages. Follow up 4 weeks after the last application.        # Personal history of NMSC and family  history of Melanoma.  - Due for next skin check Summer 2022    Procedures Performed:    None    Follow-up: 4 months to re-check the face    Staff:     Sonal Peters MD    Department of Dermatology  [facility]: Phone: [phone], Fax:[phone]  [facility]: Phone: [phone], Fax: [phone]      ____________________________________________    CC: Derm Problem (discuss starting efudex)    HPI:  Ms. Oumou Carolina is a(n) 57 year old female who presents today as a return patient for AKs on face to start efudex as she had cryo at last visit. Also new rash X 5 days. She wonders if this is a detergent. Rash was itchy. Rash is going away. Put cortisone on it and it is resolving.     Reports nose might still be rough     past away    Patient is otherwise feeling well, without additional skin concerns.    Labs Reviewed:  NA    Physical Exam:  Vitals: LMP  (LMP Unknown)   SKIN: Teledermatology photos were reviewed; image quality and interpretability:acceptable. Image date: today  - erythematous papules and possibly one pustules on chest  -faint erythematous macules on the cheeks  - No other lesions of concern on areas examined.     Medications:  Current Outpatient Medications   Medication     albuterol (PROAIR HFA) 108 (90 Base) MCG/ACT inhaler     fluticasone (FLONASE) 50 MCG/ACT nasal spray     No current facility-administered medications for this visit.      Past Medical/Surgical History:   Patient Active Problem List   Diagnosis     Persistent disorder of initiating or maintaining sleep     Hyperlipidemia LDL goal <160     Sinus bradycardia     Hammer toe     Hx of hysterectomy     Basal cell carcinoma of anterior chest s/p excision 11-4-14     Squamous cell carcinoma in situ left chest s/p excision 2-10-15     Basal cell carcinoma of left frontal scalp (hairline) s/p mohs  2-10-15     Elevated blood pressure reading without diagnosis of hypertension     History of nonmelanoma skin cancer     Actinic keratosis     Past Medical History:   Diagnosis Date     Allergy, unspecified not elsewhere classified 2003    s/p desensitization     Anxiety      Atrial tachycardia (H)     ablation 3/24/11     Bartholin's abscess 7/09     Basal cell carcinoma of anterior chest s/p excision 11-4-14 11/4/2014     Bradycardia      Excessive or frequent menstruation 2001    s/p TVH     Transient disorder of initiating or maintaining sleep 2005       CC No referring provider defined for this encounter. on close of this encounter.        Again, thank you for allowing me to participate in the care of your patient.        Sincerely,        Sonal Peters MD

## 2022-03-03 NOTE — PATIENT INSTRUCTIONS
[facility] Dermatology Visit    Thank you for allowing us to participate in your care. Your findings, instructions and follow-up plan are as follows:         When should I call my doctor?    If you are worsening or not improving, please, contact us or seek urgent care as noted below.     Who should I call with questions (adults)?    [facility] (adult and pediatric): [phone]    [facility] (adult): [phone]    For urgent needs outside of business hours call the [facility] at [phone] and ask for the dermatology resident on call    If this is a medical emergency and you are unable to reach an ER, Call 911    Who should I call with questions (pediatric)?  [facility]- Pediatric Dermatology  Dr. Ysabel Richards, Dr. Cuong Milligan, Dr. Jammie Velazquez, Latoya Odell, PA  Dr. Kira Perkins, Dr. Gregoria Calhoun & Dr. Adan Camacho  Non Urgent  Nurse Triage Line; [phone]- Whitney and Poonam [facility] Care Coordinators   Chelita (/Complex ) [phone]    If you need a prescription refill, please contact your pharmacy. Refills are approved or denied by our physicians during normal business hours, Monday through Fridays  Per office policy, refills will not be granted if you have not been seen within the past year (or sooner depending on your child's condition).    Scheduling Information:  Pediatric Appointment Scheduling and Call Center [phone]  Radiology Scheduling- [phone]  Sedation Unit Scheduling- [phone]  [place] Scheduling- General [phone]; Pediatric Dermatology [phone]  Main  Services: [phone]  Irish: [phone]  Swazi: [phone]  Hmong/Kazakh/Lithuanian: [phone]  Preadmission Nursing Department Fax Number: [phone] (fax all pre-operative paperwork to this number)    For urgent matters arising during evenings,  weekends, or holidays that cannot wait for normal business hours please call [phone] and ask for the dermatology resident on call to be paged.    Efudex Treatment    Today, you are being prescribed Fluorouracil (Efudex) a topical cream used for the treatment of Actinic Keratosis (AKs).  The medication is working to eliminate the unhealthy cells.  This treatment may be unattractive and somewhat uncomfortable.    Your treatment will last twice daily for 2-3 weeks or until the onset of irritation on the left cheek.  You may experience some mild discomfort while being treated.    You will want to stop any other creams such as glycolic acid products, retin A, Tazorac, etc. to the area. You may use bland makeup/cover-up as long as it doesn't sting or cause you discomfort.    Apply the cream at night as your physician recommends. Use a cotton-tipped applicator, or use gloves if applying it with your fingertips. If applied with unprotected fingertips, it is important to wash your hands well after you apply this medicine.     Keep this medication away from pets.    We recommend avoiding excessive sun exposure to the treated area    You may use moisturizing creams over bothersome areas such as Vanicream or Cetaphil cream if the reaction becomes too bothersome. Please, call the clinic if this occurs.   Potential Side Effects    Your treated areas may be unsightly during therapy.  This will improve slowly following the discontinuation of therapy.     During the first week of application, mild inflammation may occur.     During the following weeks, redness, and swelling may occur with some crusting and burning.     Lesions resolve as the skin exfoliates.     Over 1 to 2 weeks, new skin grows into the treatment area.    Keep this medication away from pets  Specific side effects that usually do not require medical attention (report to your doctor or health care professional if they continue or are bothersome)  include:    Red or dark-colored skin     Mild erosion (loss of upper layer of skin)     Mild eye irritation including burning, itching, sensitivity, stinging, or watering     Increased sensitivity of the skin to sun and ultraviolet light     Pain and burning of the affected area     Dryness, scaling or swelling of the affected area     Skin rash, itching of the affected area     Tenderness   If you have severe pain, please, call the clinic immediately and indicate that you have pain.  Ask for a call from the RN.     Who should I call with questions?    [facility]: [phone]    [facility]: [phone]    For urgent needs outside of business hours call the [facility] at [phone] and ask for the dermatology resident on call

## 2022-03-03 NOTE — NURSING NOTE
Teledermatology Nurse Call Patients:   Chief Complaint   Patient presents with     Derm Problem     discuss starting efudex     Are you in the [facility] at the time of the encounter? yes    Today's visit will be billed to you and your insurance.    A teledermatology visit is not as thorough as an in-person visit and the quality of the photograph sent may not be of the same quality as that taken by the dermatology clinic.  Saranya Ordoñez, CASSI on 3/3/2022 at 8:07 AM

## 2022-03-17 ASSESSMENT — ANXIETY QUESTIONNAIRES
GAD7 TOTAL SCORE: 19
5. BEING SO RESTLESS THAT IT IS HARD TO SIT STILL: NEARLY EVERY DAY
2. NOT BEING ABLE TO STOP OR CONTROL WORRYING: NEARLY EVERY DAY
3. WORRYING TOO MUCH ABOUT DIFFERENT THINGS: NEARLY EVERY DAY
GAD7 TOTAL SCORE: 19
7. FEELING AFRAID AS IF SOMETHING AWFUL MIGHT HAPPEN: MORE THAN HALF THE DAYS
GAD7 TOTAL SCORE: 19
4. TROUBLE RELAXING: NEARLY EVERY DAY
7. FEELING AFRAID AS IF SOMETHING AWFUL MIGHT HAPPEN: MORE THAN HALF THE DAYS
1. FEELING NERVOUS, ANXIOUS, OR ON EDGE: MORE THAN HALF THE DAYS
6. BECOMING EASILY ANNOYED OR IRRITABLE: NEARLY EVERY DAY

## 2022-03-17 ASSESSMENT — PATIENT HEALTH QUESTIONNAIRE - PHQ9: SUM OF ALL RESPONSES TO PHQ QUESTIONS 1-9: 22

## 2022-03-18 ASSESSMENT — ANXIETY QUESTIONNAIRES: GAD7 TOTAL SCORE: 19

## 2022-03-21 ASSESSMENT — PATIENT HEALTH QUESTIONNAIRE - PHQ9
10. IF YOU CHECKED OFF ANY PROBLEMS, HOW DIFFICULT HAVE THESE PROBLEMS MADE IT FOR YOU TO DO YOUR WORK, TAKE CARE OF THINGS AT HOME, OR GET ALONG WITH OTHER PEOPLE: SOMEWHAT DIFFICULT
SUM OF ALL RESPONSES TO PHQ QUESTIONS 1-9: 19
SUM OF ALL RESPONSES TO PHQ QUESTIONS 1-9: 19

## 2022-03-22 ENCOUNTER — VIRTUAL VISIT (OUTPATIENT)
Dept: FAMILY MEDICINE | Facility: CLINIC | Age: 57
End: 2022-03-22
Payer: COMMERCIAL

## 2022-03-22 ENCOUNTER — TELEPHONE (OUTPATIENT)
Dept: FAMILY MEDICINE | Facility: CLINIC | Age: 57
End: 2022-03-22

## 2022-03-22 DIAGNOSIS — J45.20 MILD INTERMITTENT ASTHMA WITH ALLERGIC RHINITIS WITHOUT COMPLICATION: ICD-10-CM

## 2022-03-22 DIAGNOSIS — R03.0 ELEVATED BLOOD PRESSURE READING WITHOUT DIAGNOSIS OF HYPERTENSION: ICD-10-CM

## 2022-03-22 DIAGNOSIS — Z71.89 ADVANCED DIRECTIVES, COUNSELING/DISCUSSION: ICD-10-CM

## 2022-03-22 DIAGNOSIS — Z12.11 SCREENING FOR COLON CANCER: ICD-10-CM

## 2022-03-22 DIAGNOSIS — E78.5 HYPERLIPIDEMIA LDL GOAL <160: ICD-10-CM

## 2022-03-22 DIAGNOSIS — F33.1 MODERATE RECURRENT MAJOR DEPRESSION (H): Primary | ICD-10-CM

## 2022-03-22 PROCEDURE — 99214 OFFICE O/P EST MOD 30 MIN: CPT | Mod: GT | Performed by: NURSE PRACTITIONER

## 2022-03-22 RX ORDER — ALBUTEROL SULFATE 90 UG/1
1-2 AEROSOL, METERED RESPIRATORY (INHALATION) EVERY 4 HOURS PRN
Qty: 18 G | Refills: 0 | Status: SHIPPED | OUTPATIENT
Start: 2022-03-22

## 2022-03-22 ASSESSMENT — ASTHMA QUESTIONNAIRES: ACT_TOTALSCORE: 24

## 2022-03-22 ASSESSMENT — ANXIETY QUESTIONNAIRES: GAD7 TOTAL SCORE: 19

## 2022-03-22 NOTE — LETTER
My Asthma Action Plan    Name: Oumou Carolina   YOB: 1965  Date: 3/22/2022   My doctor: TORIBIO Steen CNP   My clinic: [facility]        My Rescue Medicine:   Albuterol inhaler (Proair/Ventolin/Proventil HFA)  2-4 puffs EVERY 4 HOURS as needed. Use a spacer if recommended by your provider.   My Asthma Severity:   Intermittent / Exercise Induced  Know your asthma triggers: see attached             GREEN ZONE   Good Control    I feel good    No cough or wheeze    Can work, sleep and play without asthma symptoms       Take your asthma control medicine every day.     1. If exercise triggers your asthma, take your rescue medication    15 minutes before exercise or sports, and    During exercise if you have asthma symptoms  2. Spacer to use with inhaler: If you have a spacer, make sure to use it with your inhaler             YELLOW ZONE Getting Worse  I have ANY of these:    I do not feel good    Cough or wheeze    Chest feels tight    Wake up at night   1. Keep taking your Green Zone medications  2. Start taking your rescue medicine:    every 20 minutes for up to 1 hour. Then every 4 hours for 24-48 hours.  3. If you stay in the Yellow Zone for more than 12-24 hours, contact your doctor.  4. If you do not return to the Green Zone in 12-24 hours or you get worse, start taking your oral steroid medicine if prescribed by your provider.           RED ZONE Medical Alert - Get Help  I have ANY of these:    I feel awful    Medicine is not helping    Breathing getting harder    Trouble walking or talking    Nose opens wide to breathe       1. Take your rescue medicine NOW  2. If your provider has prescribed an oral steroid medicine, start taking it NOW  3. Call your doctor NOW  4. If you are still in the Red Zone after 20 minutes and you have not reached your doctor:    Take your rescue medicine again and    Call 911 or go to the emergency room right away    See your regular doctor  within 2 weeks of an Emergency Room or Urgent Care visit for follow-up treatment.          Annual Reminders:  Meet with Asthma Educator,  Flu Shot in the Fall, consider Pneumonia Vaccination for patients with asthma (aged 19 and older).    [id number] [place] - [address]    Electronically signed by TORIBIO Steen CNP   Date: 03/22/22                    Asthma Triggers  How To Control Things That Make Your Asthma Worse    Triggers are things that make your asthma worse.  Look at the list below to help you find your triggers and   what you can do about them. You can help prevent asthma flare-ups by staying away from your triggers.      Trigger                                                          What you can do   Cigarette Smoke  Tobacco smoke can make asthma worse. Do not allow smoking in your home, car or around you.  Be sure no one smokes at a child s day care or school.  If you smoke, ask your health care provider for ways to help you quit.  Ask family members to quit too.  Ask your health care provider for a referral to Quit Plan to help you quit smoking, or call 3-311-799-PLAN.     Colds, Flu, Bronchitis  These are common triggers of asthma. Wash your hands often.  Don t touch your eyes, nose or mouth.  Get a flu shot every year.     Dust Mites  These are tiny bugs that live in cloth or carpet. They are too small to see. Wash sheets and blankets in hot water every week.   Encase pillows and mattress in dust mite proof covers.  Avoid having carpet if you can. If you have carpet, vacuum weekly.   Use a dust mask and HEPA vacuum.   Pollen and Outdoor Mold  Some people are allergic to trees, grass, or weed pollen, or molds. Try to keep your windows closed.  Limit time out doors when pollen count is high.   Ask you health care provider about taking medicine during allergy season.     Animal Dander  Some people are allergic to  skin flakes, urine or saliva from pets with fur or feathers. Keep pets with fur or feathers out of your home.    If you can t keep the pet outdoors, then keep the pet out of your bedroom.  Keep the bedroom door closed.  Keep pets off cloth furniture and away from stuffed toys.     Mice, Rats, and Cockroaches  Some people are allergic to the waste from these pests.   Cover food and garbage.  Clean up spills and food crumbs.  Store grease in the refrigerator.   Keep food out of the bedroom.   Indoor Mold  This can be a trigger if your home has high moisture. Fix leaking faucets, pipes, or other sources of water.   Clean moldy surfaces.  Dehumidify basement if it is damp and smelly.   Smoke, Strong Odors, and Sprays  These can reduce air quality. Stay away from strong odors and sprays, such as perfume, powder, hair spray, paints, smoke incense, paint, cleaning products, candles and new carpet.   Exercise or Sports  Some people with asthma have this trigger. Be active!  Ask your doctor about taking medicine before sports or exercise to prevent symptoms.    Warm up for 5-10 minutes before and after sports or exercise.     Other Triggers of Asthma  Cold air:  Cover your nose and mouth with a scarf.  Sometimes laughing or crying can be a trigger.  Some medicines and food can trigger asthma.

## 2022-03-22 NOTE — TELEPHONE ENCOUNTER
Scheduled for physical and med check together at the end of next month since  will be on vacation mid April

## 2022-03-22 NOTE — TELEPHONE ENCOUNTER
"LMTCB, when patient calls back please give the following message from the provider:     \"Schedule mood follow-up in 3 weeks.   Needs PE- may need to schedule out a ways. But then do MA bp check next week if able. \"     Rangel Fitzgerald MA on 3/22/2022 at 9:33 AM         "

## 2022-04-13 ENCOUNTER — MYC MEDICAL ADVICE (OUTPATIENT)
Dept: FAMILY MEDICINE | Facility: CLINIC | Age: 57
End: 2022-04-13
Payer: COMMERCIAL

## 2022-04-14 ENCOUNTER — ANCILLARY PROCEDURE (OUTPATIENT)
Dept: MAMMOGRAPHY | Facility: OTHER | Age: 57
End: 2022-04-14
Attending: NURSE PRACTITIONER
Payer: COMMERCIAL

## 2022-04-14 DIAGNOSIS — Z12.31 VISIT FOR SCREENING MAMMOGRAM: ICD-10-CM

## 2022-04-14 PROCEDURE — 77063 BREAST TOMOSYNTHESIS BI: CPT | Mod: TC | Performed by: RADIOLOGY

## 2022-04-14 PROCEDURE — 77067 SCR MAMMO BI INCL CAD: CPT | Mod: TC | Performed by: RADIOLOGY

## 2022-04-26 NOTE — PROGRESS NOTES
Oumou is a 57 year old who is being evaluated via a billable video visit.      How would you like to obtain your AVS? MyChart     If the video visit is dropped, the invitation should be resent by:      Will anyone else be joining your video visit?        Video Start Time: 8:08 AM    Assessment & Plan     Moderate recurrent major depression (H)  Improving, increasing dose to Prozac 20 mg daily from every other day  Continue counseling.    is upcoming - will aid with healing.   Exercise- walking program advised.   - FLUoxetine (PROZAC) 20 MG capsule; Take 1 capsule (20 mg) by mouth daily    Elevated blood pressure reading without diagnosis of hypertension  Needs re-check, no symptoms. exercising and nutrition changes occurring.       Hyperlipidemia LDL goal <160  - needing to updated.     Screening for colon cancer  Ordered.   - Adult Gastro Ref - Procedure Only; Future    PE is advised.      Return in about 4 weeks (around 2022) for Physical Exam.    TORIBIO Steen [facility] [place] is a 57 year old who presents for the following health issues     History of Present Illness       Mental Health Follow-up:  Patient presents to follow-up on Depression & Anxiety.Patient's depression since last visit has been:  Better  The patient is not having other symptoms associated with depression.  Patient's anxiety since last visit has been:  Better  The patient is not having other symptoms associated with anxiety.  Any significant life events: grief or loss  Patient is feeling anxious or having panic attacks.  Patient has no concerns about alcohol or drug use.       Today's PHQ-9         PHQ-9 Total Score: 9  PHQ-9 Q9 Thoughts of better off dead/self-harm past 2 weeks :   (P) Not at all    How difficult have these problems made it for you to do your work, take care of things at home, or get along with other people: Somewhat difficult    Today's MICHELLE-7 Score: 8    She  eats 2-3 servings of fruits and vegetables daily.She consumes 0 sweetened beverage(s) daily.She exercises with enough effort to increase her heart rate 30 to 60 minutes per day.  She exercises with enough effort to increase her heart rate 3 or less days per week.   She is taking medications regularly.     Is sleeping better.   Is in counseling. Thought she was only to take it every other day.   service is this month for her ex-.   Still dealing with ex-'s belongings- this has been a bit difficult.   Has been healing slowly.   Feeling better on meds.   Feel more energetic with days she takes the medications.   Learning how to relax and take time for herself.     Panic-seems to be avoiding panic attacks.   Has been into the office.     Weight is improving- losing.   Getting back into exercise routine.                Review of Systems   Constitutional, HEENT, cardiovascular, pulmonary, gi and gu systems are negative, except as otherwise noted.      Objective           Vitals:  No vitals were obtained today due to virtual visit.    Physical Exam   GENERAL: Healthy, alert and no distress  EYES: Eyes grossly normal to inspection.  No discharge or erythema, or obvious scleral/conjunctival abnormalities.  RESP: No audible wheeze, cough, or visible cyanosis.  No visible retractions or increased work of breathing.    SKIN: Visible skin clear. No significant rash, abnormal pigmentation or lesions.  NEURO: Cranial nerves grossly intact.  Mentation and speech appropriate for age.  PSYCH: Mentation appears normal, affect normal/bright, judgement and insight intact, normal speech and appearance well-groomed.                Video-Visit Details    Type of service:  Video Visit    Video End Time:826    Originating Location (pt. Location): Home    Distant Location (provider location):  [facility] [employer]     Platform used for Video Visit: [employer]

## 2022-05-02 ASSESSMENT — PATIENT HEALTH QUESTIONNAIRE - PHQ9
SUM OF ALL RESPONSES TO PHQ QUESTIONS 1-9: 9
10. IF YOU CHECKED OFF ANY PROBLEMS, HOW DIFFICULT HAVE THESE PROBLEMS MADE IT FOR YOU TO DO YOUR WORK, TAKE CARE OF THINGS AT HOME, OR GET ALONG WITH OTHER PEOPLE: SOMEWHAT DIFFICULT
SUM OF ALL RESPONSES TO PHQ QUESTIONS 1-9: 9

## 2022-05-02 ASSESSMENT — ANXIETY QUESTIONNAIRES
3. WORRYING TOO MUCH ABOUT DIFFERENT THINGS: MORE THAN HALF THE DAYS
7. FEELING AFRAID AS IF SOMETHING AWFUL MIGHT HAPPEN: SEVERAL DAYS
7. FEELING AFRAID AS IF SOMETHING AWFUL MIGHT HAPPEN: SEVERAL DAYS
6. BECOMING EASILY ANNOYED OR IRRITABLE: SEVERAL DAYS
GAD7 TOTAL SCORE: 8
2. NOT BEING ABLE TO STOP OR CONTROL WORRYING: SEVERAL DAYS
5. BEING SO RESTLESS THAT IT IS HARD TO SIT STILL: NOT AT ALL
4. TROUBLE RELAXING: MORE THAN HALF THE DAYS
GAD7 TOTAL SCORE: 8
GAD7 TOTAL SCORE: 8
1. FEELING NERVOUS, ANXIOUS, OR ON EDGE: SEVERAL DAYS

## 2022-05-03 ENCOUNTER — VIRTUAL VISIT (OUTPATIENT)
Dept: FAMILY MEDICINE | Facility: CLINIC | Age: 57
End: 2022-05-03
Payer: COMMERCIAL

## 2022-05-03 ENCOUNTER — TELEPHONE (OUTPATIENT)
Dept: FAMILY MEDICINE | Facility: CLINIC | Age: 57
End: 2022-05-03

## 2022-05-03 DIAGNOSIS — F33.1 MODERATE RECURRENT MAJOR DEPRESSION (H): Primary | ICD-10-CM

## 2022-05-03 DIAGNOSIS — Z12.11 SCREENING FOR COLON CANCER: ICD-10-CM

## 2022-05-03 DIAGNOSIS — R03.0 ELEVATED BLOOD PRESSURE READING WITHOUT DIAGNOSIS OF HYPERTENSION: ICD-10-CM

## 2022-05-03 DIAGNOSIS — E78.5 HYPERLIPIDEMIA LDL GOAL <160: ICD-10-CM

## 2022-05-03 PROCEDURE — 99214 OFFICE O/P EST MOD 30 MIN: CPT | Mod: GT | Performed by: NURSE PRACTITIONER

## 2022-05-03 ASSESSMENT — PATIENT HEALTH QUESTIONNAIRE - PHQ9
10. IF YOU CHECKED OFF ANY PROBLEMS, HOW DIFFICULT HAVE THESE PROBLEMS MADE IT FOR YOU TO DO YOUR WORK, TAKE CARE OF THINGS AT HOME, OR GET ALONG WITH OTHER PEOPLE: SOMEWHAT DIFFICULT
SUM OF ALL RESPONSES TO PHQ QUESTIONS 1-9: 9

## 2022-05-03 ASSESSMENT — ANXIETY QUESTIONNAIRES: GAD7 TOTAL SCORE: 8

## 2022-05-03 NOTE — LETTER
My Depression Action Plan  Name: Oumou Carolina   Date of Birth 1965  Date: 5/3/2022    My doctor: Florinda Eaton   [facility]  [id number] [place] [address], [address]  [place] [id number] [phone]          GREEN    ZONE   Good Control    What it looks like:     Things are going generally well. You have normal ups and downs. You may even feel depressed from time to time, but bad moods usually last less than a day.   What you need to do:  1. Continue to care for yourself (see self care plan)  2. Check your depression survival kit and update it as needed  3. Follow your physician s recommendations including any medication.  4. Do not stop taking medication unless you consult with your physician first.           YELLOW         ZONE Getting Worse    What it looks like:     Depression is starting to interfere with your life.     It may be hard to get out of bed; you may be starting to isolate yourself from others.    Symptoms of depression are starting to last most all day and this has happened for several days.     You may have suicidal thoughts but they are not constant.   What you need to do:     1. Call your care team. Your response to treatment will improve if you keep your care team informed of your progress. Yellow periods are signs an adjustment may need to be made.     2. Continue your self-care.  Just get dressed and ready for the day.  Don't give yourself time to talk yourself out of it.    3. Talk to someone in your support network.    4. Open up your Depression Self-Care Plan/Wellness Kit.           RED    ZONE Medical Alert - Get Help    What it looks like:     Depression is seriously interfering with your life.     You may experience these or other symptoms: You can t get out of bed most days, can t work or engage in other necessary activities, you have trouble taking care of basic hygiene, or basic responsibilities, thoughts of suicide or death that will  not go away, self-injurious behavior.     What you need to do:  1. Call your care team and request a same-day appointment. If they are not available (weekends or after hours) call your local crisis line, emergency room or 911.          Depression Self-Care Plan / Wellness Kit    Many people find that medication and therapy are helpful treatments for managing depression. In addition, making small changes to your everyday life can help to boost your mood and improve your wellbeing. Below are some tips for you to consider. Be sure to talk with your medical provider and/or behavioral health consultant if your symptoms are worsening or not improving.     Sleep   Sleep hygiene  means all of the habits that support good, restful sleep. It includes maintaining a consistent bedtime and wake time, using your bedroom only for sleeping or sex, and keeping the bedroom dark and free of distractions like a computer, smartphone, or television.     Develop a Healthy Routine  Maintain good hygiene. Get out of bed in the morning, make your bed, brush your teeth, take a shower, and get dressed. Don t spend too much time viewing media that makes you feel stressed. Find time to relax each day.    Exercise  Get some form of exercise every day. This will help reduce pain and release endorphins, the  feel good  chemicals in your brain. It can be as simple as just going for a walk or doing some gardening, anything that will get you moving.      Diet  Strive to eat healthy foods, including fruits and vegetables. Drink plenty of water. Avoid excessive sugar, caffeine, alcohol, and other mood-altering substances.     Stay Connected with Others  Stay in touch with friends and family members.    Manage Your Mood  Try deep breathing, massage therapy, biofeedback, or meditation. Take part in fun activities when you can. Try to find something to smile about each day.     Psychotherapy  Be open to working with a therapist if your provider recommends  it.     Medication  Be sure to take your medication as prescribed. Most anti-depressants need to be taken every day. It usually takes several weeks for medications to work. Not all medicines work for all people. It is important to follow-up with your provider to make sure you have a treatment plan that is working for you. Do not stop your medication abruptly without first discussing it with your provider.    Crisis Resources   These hotlines are for both adults and children. They and are open 24 hours a day, 7 days a week unless noted otherwise.      National Suicide Prevention Lifeline   2-401-158-TALK (6782)      Crisis Text Line    www.crisistextline.org  Text HOME to 083706 from anywhere in the [place], anytime, about any type of crisis. A live, trained crisis counselor will receive the text and respond quickly.      [place] Lifeline for LGBTQ Youth  A national crisis intervention and suicide lifeline for LGBTQ youth under 25. Provides a safe place to talk without judgement. Call 1-749.562.4605; text START to 323544 or visit www.thetrevorproject.org to talk to a trained counselor.      For [facility] crisis numbers, visit the [facility] website at:  https://[place].gov/dhs/people-we-serve/adults/health-care/mental-health/resources/crisis-contacts.jsp

## 2022-09-23 ENCOUNTER — OFFICE VISIT (OUTPATIENT)
Dept: DERMATOLOGY | Facility: CLINIC | Age: 57
End: 2022-09-23
Payer: COMMERCIAL

## 2022-09-23 DIAGNOSIS — Z80.8 FAMILY HISTORY OF MELANOMA: ICD-10-CM

## 2022-09-23 DIAGNOSIS — Z87.2 HISTORY OF ACTINIC KERATOSES: ICD-10-CM

## 2022-09-23 DIAGNOSIS — Z85.828 HISTORY OF NONMELANOMA SKIN CANCER: Primary | ICD-10-CM

## 2022-09-23 DIAGNOSIS — D49.2 NEOPLASM OF UNSPECIFIED BEHAVIOR OF BONE, SOFT TISSUE, AND SKIN: ICD-10-CM

## 2022-09-23 PROCEDURE — 88305 TISSUE EXAM BY PATHOLOGIST: CPT | Performed by: PATHOLOGY

## 2022-09-23 PROCEDURE — 99213 OFFICE O/P EST LOW 20 MIN: CPT | Mod: 25 | Performed by: DERMATOLOGY

## 2022-09-23 PROCEDURE — 11102 TANGNTL BX SKIN SINGLE LES: CPT | Performed by: DERMATOLOGY

## 2022-09-23 NOTE — PROGRESS NOTES
[facility] Dermatology Note  Encounter Date: Sep 23, 2022  Office Visit     Dermatology Problem List:  Last skin check 9/23/22, due yearly  0. NUB - left forehead, s/p bx 9/23/22  1. Family history of melanoma (mother)  2. NMSC  - BCC left jaw line, s/p excision 8/27/2018  - BCC, central abdomen, s/p excision 11/25/2015  - BCC, nodular, left frontal scalp, s/p Mohs 2/10/15  - SCCIS, left chest, s/p excision 2/10/15  - BCC, nodular with fibrosis, right chest, s/p excision 11/4/14   3. Actinic Keratosis - s/p cryotherapy, PDT 2019 x 3, Efudex 3/2015  - HAK, left medial cheek, s/p bx 11/30/21, s/p cryo then efudex  - AK, left infraorbital, s/p bx 11/30/21, resolved  - Pigmented AK, central chest, s/p bx 11/30/21, s/p cryo 1/21/22  - Pigmented AK, left chest, s/p bx 8/10/18  4. Seborrheic dermatitis  - Lidex 8/2018  5. Folliculitis, central chest, s/p biopsy 8/10/2018  6. EIC vs dermatofibroma, mid central back  7. Epidermoid cyst, back, s/p excision 2/21/19     Social History -  past away 12/2021    ____________________________________________     Assessment & Plan:     # Personal history of NMSC and family history of Melanoma.  - ABCDEs: Counseled ABCDEs of melanoma: Asymmetry, Border (irregularity), Color (not uniform, changes in color), Diameter (greater than 6 mm which is about the size of a pencil eraser), and Evolving (any changes in preexisting moles).  - Sun protection: Counseled SPF30+ sunscreen, UPF clothing, sun avoidance, tanning bed avoidance.      # Neoplasm of unspecified behavior of the skin (D49.2) on the left forehead. The differential diagnosis includes SK vs excoriation vs prurigo nodule. Patient denies picking.  - Photograph obtained.  - See procedure note.     # cyst with comedone  on the right lateral breast.  - Recheck on follow up with derm surgery.    # Hypopigmented macule on the left philtrum with no arborizing telangiectasias but definate change in skin texture  here.   - Follow up with derm surgery in 3 months for spot check    # Biopsy proven HAK, left medial cheek-  S/p efudex     Procedures Performed:   - Shave biopsy procedure note, location(s): left forehead. After discussion of benefits and risks including but not limited to bleeding, infection, scar, incomplete removal, recurrence, and non-diagnostic biopsy, written consent and photographs were obtained. The area was cleaned with isopropyl alcohol. 0.5mL of 1% lidocaine with epinephrine was injected to obtain adequate anesthesia of lesion(s). Shave biopsy at site(s) performed. Hemostasis was achieved with aluminium chloride. Petrolatum ointment and a sterile dressing were applied. The patient tolerated the procedure and no complications were noted. The patient was provided with verbal and written post care instructions.       Follow-up: with Dr. Hyde/Fawad  in 3 month(s) for a spot check left philtrum (if lesion concerning consider biopsy) and right lateral breaks,  1 year(s) in-person for a skin check, or earlier for new or changing lesions    Staff and Scribe:     Scribe Disclosure:   I, Srinivasa Candelaria, am serving as a scribe to document services personally performed by this physician, Dr. Sonal Peters, based on data collection and the provider's statements to me.       Provider Disclosure:   The documentation recorded by the scribe accurately reflects the services I personally performed and the decisions made by me.    Sonal Peters MD    Department of Dermatology  [facility]: Phone: [phone], Fax:[phone]  [facility]: Phone: [phone], Fax: [phone]      ____________________________________________    CC: Skin Check (Spot check after Efudex: HAK, left medial cheek.  Area of concern on right breast, left forehead and lip-would like FBSE)    HPI:  Ms. Oumou Carolina is  a(n) 57 year old female who presents today as a return patient for a skin check.    Last seen virtually 3/3/22 for a follow up. At that time, patient was instructed to apply Efudex for 2-3 weeks on the cheeks until irritation occurs.    Today, she notes an area of concern on the left breast, left forehead, and lip. She notes that the spot on the left forehead is painful and has been present for most of the summer.  She would also like her left cheek rechecked after her Efudex treatment.  She has lost her voice Sunday.    Patient is otherwise feeling well, without additional skin concerns.    Labs Reviewed:  N/A    Physical Exam:  Vitals: LMP  (LMP Unknown)   SKIN: Total skin excluding the undergarment areas was performed. The exam included the head/face, neck, both arms, chest, back, abdomen, both legs, digits and/or nails. Declines genital exam.  - 3 mm crusted papule on the left forehead  - Hypopigmented macule on the left philtrum with telangiectasias, potentially BCC.  - Acneiform papule on the right breast.   - There is a tan to brown waxy stuck on papule with surrounding erythema on the left forehead. .   - No other lesions of concern on areas examined.     Medications:  Current Outpatient Medications   Medication     albuterol (PROAIR HFA) 108 (90 Base) MCG/ACT inhaler     FLUoxetine (PROZAC) 20 MG capsule     fluticasone (FLONASE) 50 MCG/ACT nasal spray     No current facility-administered medications for this visit.      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 2-10-15     Basal cell carcinoma of left frontal scalp (hairline) s/p mohs 2-10-15     Elevated blood pressure reading without diagnosis of hypertension      History of nonmelanoma skin cancer     Actinic keratosis     Past Medical History:   Diagnosis Date     Allergy, unspecified not elsewhere classified 2003    s/p desensitization     Anxiety      Atrial tachycardia (H)     ablation 3/24/11     Bartholin's abscess 7/09     Basal cell carcinoma of anterior chest s/p excision 11-4-14 11/4/2014     Bradycardia      Excessive or frequent menstruation 2001    s/p TVH     Transient disorder of initiating or maintaining sleep 2005        CC No referring provider defined for this encounter. on close of this encounter.

## 2022-09-23 NOTE — NURSING NOTE
Dermatology Rooming Note    Oumou Carolina's goals for this visit include:   Chief Complaint   Patient presents with     Skin Check     Spot check after Efudex: HAK, left medial cheek.  Area of concern on right breast, left forehead and lip-would like FBSE       Is a scribe okay for this visit:YES    Are records needed for this visit(If yes, obtain release of information): Not applicable     Vitals: LMP  (LMP Unknown)     Referring Provider:  No referring provider defined for this encounter.  Katrina Escobar RN

## 2022-09-23 NOTE — PATIENT INSTRUCTIONS
Wound Care After a Biopsy    What is a skin biopsy?  A skin biopsy allows the doctor to examine a very small piece of tissue under the microscope to determine the diagnosis and the best treatment for the skin condition. A local anesthetic (numbing medicine)  is injected with a very small needle into the skin area to be tested. A small piece of skin is taken from the area. Sometimes a suture (stitch) is used.     What are the risks of a skin biopsy?  I will experience scar, bleeding, swelling, pain, crusting and redness. I may experience incomplete removal or recurrence. Risks of this procedure are excessive bleeding, bruising, infection, nerve damage, numbness, thick (hypertrophic or keloidal) scar and non-diagnostic biopsy.    How should I care for my wound for the first 24 hours?  Keep the wound dry and covered for 24 hours  If it bleeds, hold direct pressure on the area for 15 minutes. If bleeding does not stop then go to the emergency room  Avoid strenuous exercise the first 1-2 days or as your doctor instructs you    How should I care for the wound after 24 hours?  After 24 hours, remove the bandage  You may bathe or shower as normal  If you had a scalp biopsy, you can shampoo as usual and can use shower water to clean the biopsy site daily  Clean the wound twice a day with gentle soap and water  Do not scrub, be gentle  Apply white petroleum/Vaseline after cleaning the wound with a cotton swab or a clean finger, and keep the site covered with a Bandaid /bandage. Bandages are not necessary with a scalp biopsy  If you are unable to cover the site with a Bandaid /bandage, re-apply ointment 2-3 times a day to keep the site moist. Moisture will help with healing  Avoid strenuous activity for first 1-2 days  Avoid lakes, rivers, pools, and oceans until the stitches are removed or the site is healed    How do I clean my wound?  Wash hands thoroughly with soap or use hand  before all wound care  Clean the  wound with gentle soap and water  Apply white petroleum/Vaseline  to wound after it is clean  Replace the Bandaid /bandage to keep the wound covered for the first few days or as instructed by your doctor  If you had a scalp biopsy, warm shower water to the area on a daily basis should suffice    What should I use to clean my wound?   Cotton-tipped applicators (Qtips )  White petroleum jelly (Vaseline ). Use a clean new container and use Q-tips to apply.  Bandaids   as needed  Gentle soap     How should I care for my wound long term?  Do not get your wound dirty  Keep up with wound care for one week or until the area is healed.  A small scab will form and fall off by itself when the area is completely healed. The area will be red and will become pink in color as it heals. Sun protection is very important for how your scar will turn out. Sunscreen with an SPF 30 or greater is recommended once the area is healed.  You should have some soreness but it should be mild and slowly go away over several days. Talk to your doctor about using tylenol for pain,    When should I call my doctor?  If you have increased:   Pain or swelling  Pus or drainage (clear or slightly yellow drainage is ok)  Temperature over 100F  Spreading redness or warmth around wound    When will I hear about my results?  The biopsy results can take 2 weeks to come back.  Your results will automatically release to [employer] before your provider has even reviewed them.  The clinic will call you with the results, send you a YouView message, or have you schedule a follow-up clinic or phone time to discuss the results.  Contact our clinics if you do not hear from us in 2 weeks.    Who should I call with questions?  [facility]: [phone]  [facility]: [phone]  For urgent needs outside of business hours call the [facility] at [phone] and ask for the dermatology resident on  call        Patient Education     Checking for Skin Cancer  You can find cancer early by checking your skin each month. There are 3 kinds of skin cancer. They are melanoma, basal cell carcinoma, and squamous cell carcinoma. Doing monthly skin checks is the best way to find new marks or skin changes. Follow the instructions below for checking your skin.   The ABCDEs of checking moles for melanoma   Check your moles or growths for signs of melanoma using ABCDE:   Asymmetry: the sides of the mole or growth don t match  Border: the edges are ragged, notched, or blurred  Color: the color within the mole or growth varies  Diameter: the mole or growth is larger than 6 mm (size of a pencil eraser)  Evolving: the size, shape, or color of the mole or growth is changing (evolving is not shown in the images below)    Checking for other types of skin cancer  Basal cell carcinoma or squamous cell carcinoma have symptoms such as:     A spot or mole that looks different from all other marks on your skin  Changes in how an area feels, such as itching, tenderness, or pain  Changes in the skin's surface, such as oozing, bleeding, or scaliness  A sore that does not heal  New swelling or redness beyond the border of a mole    Who s at risk?  Anyone can get skin cancer. But you are at greater risk if you have:   Fair skin, light-colored hair, or light-colored eyes  Many moles or abnormal moles on your skin  A history of sunburns from sunlight or tanning beds  A family history of skin cancer  A history of exposure to radiation or chemicals  A weakened immune system  If you have had skin cancer in the past, you are at risk for recurring skin cancer.   How to check your skin  Do your monthly skin checkups in front of a full-length mirror. Check all parts of your body, including your:   Head (ears, face, neck, and scalp)  Torso (front, back, and sides)  Arms (tops, undersides, upper, and lower armpits)  Hands (palms, backs, and fingers,  including under the nails)  Buttocks and genitals  Legs (front, back, and sides)  Feet (tops, soles, toes, including under the nails, and between toes)  If you have a lot of moles, take digital photos of them each month. Make sure to take photos both up close and from a distance. These can help you see if any moles change over time.   Most skin changes are not cancer. But if you see any changes in your skin, call your doctor right away. Only he or she can diagnose a problem. If you have skin cancer, seeing your doctor can be the first step toward getting the treatment that could save your life.   "[employer] last reviewed this educational content on 4/1/2019 2000-2020 [employer]. [address] [place] [address], [place] [address]. All rights reserved. This information is not intended as a substitute for professional medical care. Always follow your healthcare professional's instructions.       When should I call my doctor?  If you are worsening or not improving, please, contact us or seek urgent care as noted below.     Who should I call with questions (adults)?  [facility] (adult and pediatric): [phone]  [facility] (adult): [phone]  For urgent needs outside of business hours call the [facility] at [phone] and ask for the dermatology resident on call to be paged  If this is a medical emergency and you are unable to reach an ER, Call [phone]    Who should I call with questions (pediatric)?  [facility]- Pediatric Dermatology  Dr. Ysabel Richards, Dr. Cuong Milligan, Dr. Jammie Velazquez, ASHLYN Vargas, Dr. Kira Perkins, Dr. Gregoria Calhoun & Dr. Adan Camacho  Non-urgent nurse triage line; [phone]- Whitney and Poonam WALDEN Care Coordinatorjaleel Merlos (/Complex ) [phone]    If you need a prescription refill, please contact your pharmacy. Refills are approved or denied by  our Physicians during normal business hours, Monday through Fridays  Per office policy, refills will not be granted if you have not been seen within the past year (or sooner depending on your child's condition)    Scheduling Information:  Pediatric Appointment Scheduling and Call Center [phone]  Radiology Scheduling- [phone]  Sedation Unit Scheduling- [phone]  [place] Scheduling- General [phone]; Pediatric Dermatology [phone]  Main  Services: [phone]  [place]ican: [phone]  Cymro: [phone]  Hmong/Spanish/Danish: [phone]  Preadmission Nursing Department Fax Number: [phone] (Fax all pre-operative paperwork to this number)    For urgent matters arising during evenings, weekends, or holidays that cannot wait for normal business hours please call [phone] and ask for the dermatology resident on call to be paged.

## 2022-09-23 NOTE — LETTER
9/23/2022         RE: Oumou Carolina  [address] [facility] [address]        Dear Colleague,    Thank you for referring your patient, Oumou Carolina, to the [facility]. Please see a copy of my visit note below.    [facility] Dermatology Note  Encounter Date: Sep 23, 2022  Office Visit     Dermatology Problem List:  Last skin check 9/23/22, due yearly  0. NUB - left forehead, s/p bx 9/23/22  1. Family history of melanoma (mother)  2. NMSC  - BCC left jaw line, s/p excision 8/27/2018  - BCC, central abdomen, s/p excision 11/25/2015  - BCC, nodular, left frontal scalp, s/p Mohs 2/10/15  - SCCIS, left chest, s/p excision 2/10/15  - BCC, nodular with fibrosis, right chest, s/p excision 11/4/14   3. Actinic Keratosis - s/p cryotherapy, PDT 2019 x 3, Efudex 3/2015  - HAK, left medial cheek, s/p bx 11/30/21, s/p cryo then efudex  - AK, left infraorbital, s/p bx 11/30/21, resolved  - Pigmented AK, central chest, s/p bx 11/30/21, s/p cryo 1/21/22  - Pigmented AK, left chest, s/p bx 8/10/18  4. Seborrheic dermatitis  - Lidex 8/2018  5. Folliculitis, central chest, s/p biopsy 8/10/2018  6. EIC vs dermatofibroma, mid central back  7. Epidermoid cyst, back, s/p excision 2/21/19     Social History -  past away 12/2021    ____________________________________________     Assessment & Plan:     # Personal history of NMSC and family history of Melanoma.  - ABCDEs: Counseled ABCDEs of melanoma: Asymmetry, Border (irregularity), Color (not uniform, changes in color), Diameter (greater than 6 mm which is about the size of a pencil eraser), and Evolving (any changes in preexisting moles).  - Sun protection: Counseled SPF30+ sunscreen, UPF clothing, sun avoidance, tanning bed avoidance.      # Neoplasm of unspecified behavior of the skin (D49.2) on the left forehead. The differential diagnosis includes SK vs excoriation vs prurigo nodule. Patient denies picking.  - Photograph  obtained.  - See procedure note.     # cyst with comedone  on the right lateral breast.  - Recheck on follow up with derm surgery.    # Hypopigmented macule on the left philtrum with no arborizing telangiectasias but definate change in skin texture here.   - Follow up with derm surgery in 3 months for spot check    # Biopsy proven HAK, left medial cheek-  S/p efudex     Procedures Performed:   - Shave biopsy procedure note, location(s): left forehead. After discussion of benefits and risks including but not limited to bleeding, infection, scar, incomplete removal, recurrence, and non-diagnostic biopsy, written consent and photographs were obtained. The area was cleaned with isopropyl alcohol. 0.5mL of 1% lidocaine with epinephrine was injected to obtain adequate anesthesia of lesion(s). Shave biopsy at site(s) performed. Hemostasis was achieved with aluminium chloride. Petrolatum ointment and a sterile dressing were applied. The patient tolerated the procedure and no complications were noted. The patient was provided with verbal and written post care instructions.       Follow-up: with Dr. Hyde/Fawad  in 3 month(s) for a spot check left philtrum (if lesion concerning consider biopsy) and right lateral breaks,  1 year(s) in-person for a skin check, or earlier for new or changing lesions    Staff and Scribe:     Scribe Disclosure:   I, Srinivasa Candelaria, am serving as a scribe to document services personally performed by this physician, Dr. Sonal Peters, based on data collection and the provider's statements to me.       Provider Disclosure:   The documentation recorded by the scribe accurately reflects the services I personally performed and the decisions made by me.    Sonal Peters MD    Department of Dermatology  [facility]: Phone: [phone], Fax:[phone]  [facility]: Phone:  [phone], Fax: [phone]      ____________________________________________    CC: Skin Check (Spot check after Efudex: HAK, left medial cheek.  Area of concern on right breast, left forehead and lip-would like FBSE)    HPI:  Ms. Oumou Carolina is a(n) 57 year old female who presents today as a return patient for a skin check.    Last seen virtually 3/3/22 for a follow up. At that time, patient was instructed to apply Efudex for 2-3 weeks on the cheeks until irritation occurs.    Today, she notes an area of concern on the left breast, left forehead, and lip. She notes that the spot on the left forehead is painful and has been present for most of the summer.  She would also like her left cheek rechecked after her Efudex treatment.  She has lost her voice Sunday.    Patient is otherwise feeling well, without additional skin concerns.    Labs Reviewed:  N/A    Physical Exam:  Vitals: LMP  (LMP Unknown)   SKIN: Total skin excluding the undergarment areas was performed. The exam included the head/face, neck, both arms, chest, back, abdomen, both legs, digits and/or nails. Declines genital exam.  - 3 mm crusted papule on the left forehead  - Hypopigmented macule on the left philtrum with telangiectasias, potentially BCC.  - Acneiform papule on the right breast.   - There is a tan to brown waxy stuck on papule with surrounding erythema on the left forehead. .   - No other lesions of concern on areas examined.     Medications:  Current Outpatient Medications   Medication     albuterol (PROAIR HFA) 108 (90 Base) MCG/ACT inhaler     FLUoxetine (PROZAC) 20 MG capsule     fluticasone (FLONASE) 50 MCG/ACT nasal spray     No current facility-administered medications for this visit.      Past Medical History:   Patient Active Problem List   Diagnosis     Persistent disorder of initiating or maintaining sleep     Hyperlipidemia LDL goal <160     Sinus bradycardia     Hammer toe     Hx of hysterectomy     Basal cell  carcinoma of anterior chest s/p excision 11-4-14     Squamous cell carcinoma in situ left chest s/p excision 2-10-15     Basal cell carcinoma of left frontal scalp (hairline) s/p mohs 2-10-15     Elevated blood pressure reading without diagnosis of hypertension     History of nonmelanoma skin cancer     Actinic keratosis     Past Medical History:   Diagnosis Date     Allergy, unspecified not elsewhere classified 2003    s/p desensitization     Anxiety      Atrial tachycardia (H)     ablation 3/24/11     Bartholin's abscess 7/09     Basal cell carcinoma of anterior chest s/p excision 11-4-14 11/4/2014     Bradycardia      Excessive or frequent menstruation 2001    s/p TVH     Transient disorder of initiating or maintaining sleep 2005        CC No referring provider defined for this encounter. on close of this encounter.       Again, thank you for allowing me to participate in the care of your patient.        Sincerely,        Sonal Peters MD

## 2022-10-09 ENCOUNTER — HEALTH MAINTENANCE LETTER (OUTPATIENT)
Age: 57
End: 2022-10-09

## 2022-10-10 ENCOUNTER — OFFICE VISIT (OUTPATIENT)
Dept: FAMILY MEDICINE | Facility: CLINIC | Age: 57
End: 2022-10-10
Payer: COMMERCIAL

## 2022-10-10 VITALS
TEMPERATURE: 98.5 F | RESPIRATION RATE: 12 BRPM | OXYGEN SATURATION: 98 % | BODY MASS INDEX: 31.5 KG/M2 | HEART RATE: 66 BPM | SYSTOLIC BLOOD PRESSURE: 120 MMHG | DIASTOLIC BLOOD PRESSURE: 80 MMHG | WEIGHT: 196 LBS | HEIGHT: 66 IN

## 2022-10-10 DIAGNOSIS — J45.20 MILD INTERMITTENT ASTHMA WITH ALLERGIC RHINITIS WITHOUT COMPLICATION: ICD-10-CM

## 2022-10-10 DIAGNOSIS — E78.5 HYPERLIPIDEMIA LDL GOAL <160: ICD-10-CM

## 2022-10-10 DIAGNOSIS — F33.1 MODERATE RECURRENT MAJOR DEPRESSION (H): ICD-10-CM

## 2022-10-10 DIAGNOSIS — Z13.220 SCREENING FOR HYPERLIPIDEMIA: ICD-10-CM

## 2022-10-10 DIAGNOSIS — Z12.11 SCREENING FOR COLON CANCER: ICD-10-CM

## 2022-10-10 DIAGNOSIS — J30.89 SEASONAL ALLERGIC RHINITIS DUE TO OTHER ALLERGIC TRIGGER: ICD-10-CM

## 2022-10-10 DIAGNOSIS — Z00.00 ROUTINE GENERAL MEDICAL EXAMINATION AT A HEALTH CARE FACILITY: Primary | ICD-10-CM

## 2022-10-10 DIAGNOSIS — E66.811 CLASS 1 OBESITY DUE TO EXCESS CALORIES WITH SERIOUS COMORBIDITY IN ADULT, UNSPECIFIED BMI: ICD-10-CM

## 2022-10-10 DIAGNOSIS — Z78.0 ASYMPTOMATIC POSTMENOPAUSAL STATUS: ICD-10-CM

## 2022-10-10 DIAGNOSIS — E66.09 CLASS 1 OBESITY DUE TO EXCESS CALORIES WITH SERIOUS COMORBIDITY IN ADULT, UNSPECIFIED BMI: ICD-10-CM

## 2022-10-10 LAB
BASOPHILS # BLD AUTO: 0 10E3/UL (ref 0–0.2)
BASOPHILS NFR BLD AUTO: 1 %
CHOLEST SERPL-MCNC: 234 MG/DL
EOSINOPHIL # BLD AUTO: 0.1 10E3/UL (ref 0–0.7)
EOSINOPHIL NFR BLD AUTO: 3 %
ERYTHROCYTE [DISTWIDTH] IN BLOOD BY AUTOMATED COUNT: 12.7 % (ref 10–15)
FASTING STATUS PATIENT QL REPORTED: NO
HBA1C MFR BLD: 5.3 % (ref 0–5.6)
HCT VFR BLD AUTO: 38.5 % (ref 35–47)
HDLC SERPL-MCNC: 93 MG/DL
HGB BLD-MCNC: 12.8 G/DL (ref 11.7–15.7)
IMM GRANULOCYTES # BLD: 0 10E3/UL
IMM GRANULOCYTES NFR BLD: 0 %
LDLC SERPL CALC-MCNC: 127 MG/DL
LYMPHOCYTES # BLD AUTO: 1.6 10E3/UL (ref 0.8–5.3)
LYMPHOCYTES NFR BLD AUTO: 35 %
MCH RBC QN AUTO: 29.1 PG (ref 26.5–33)
MCHC RBC AUTO-ENTMCNC: 33.2 G/DL (ref 31.5–36.5)
MCV RBC AUTO: 88 FL (ref 78–100)
MONOCYTES # BLD AUTO: 0.3 10E3/UL (ref 0–1.3)
MONOCYTES NFR BLD AUTO: 7 %
NEUTROPHILS # BLD AUTO: 2.5 10E3/UL (ref 1.6–8.3)
NEUTROPHILS NFR BLD AUTO: 55 %
NONHDLC SERPL-MCNC: 141 MG/DL
PLATELET # BLD AUTO: 170 10E3/UL (ref 150–450)
RBC # BLD AUTO: 4.4 10E6/UL (ref 3.8–5.2)
TRIGL SERPL-MCNC: 68 MG/DL
TSH SERPL DL<=0.005 MIU/L-ACNC: 1.26 MU/L (ref 0.4–4)
WBC # BLD AUTO: 4.6 10E3/UL (ref 4–11)

## 2022-10-10 PROCEDURE — 84443 ASSAY THYROID STIM HORMONE: CPT | Performed by: NURSE PRACTITIONER

## 2022-10-10 PROCEDURE — 99214 OFFICE O/P EST MOD 30 MIN: CPT | Mod: 25 | Performed by: NURSE PRACTITIONER

## 2022-10-10 PROCEDURE — 99396 PREV VISIT EST AGE 40-64: CPT | Mod: 25 | Performed by: NURSE PRACTITIONER

## 2022-10-10 PROCEDURE — 90472 IMMUNIZATION ADMIN EACH ADD: CPT | Performed by: NURSE PRACTITIONER

## 2022-10-10 PROCEDURE — 90682 RIV4 VACC RECOMBINANT DNA IM: CPT | Performed by: NURSE PRACTITIONER

## 2022-10-10 PROCEDURE — 90750 HZV VACC RECOMBINANT IM: CPT | Performed by: NURSE PRACTITIONER

## 2022-10-10 PROCEDURE — 83036 HEMOGLOBIN GLYCOSYLATED A1C: CPT | Performed by: NURSE PRACTITIONER

## 2022-10-10 PROCEDURE — 90471 IMMUNIZATION ADMIN: CPT | Performed by: NURSE PRACTITIONER

## 2022-10-10 PROCEDURE — 36415 COLL VENOUS BLD VENIPUNCTURE: CPT | Performed by: NURSE PRACTITIONER

## 2022-10-10 PROCEDURE — 80061 LIPID PANEL: CPT | Performed by: NURSE PRACTITIONER

## 2022-10-10 PROCEDURE — 85025 COMPLETE CBC W/AUTO DIFF WBC: CPT | Performed by: NURSE PRACTITIONER

## 2022-10-10 RX ORDER — FLUOXETINE 40 MG/1
40 CAPSULE ORAL DAILY
Qty: 90 CAPSULE | Refills: 0 | Status: SHIPPED | OUTPATIENT
Start: 2022-10-10 | End: 2022-11-10

## 2022-10-10 ASSESSMENT — ENCOUNTER SYMPTOMS
FREQUENCY: 0
HEADACHES: 0
MYALGIAS: 0
JOINT SWELLING: 0
ABDOMINAL PAIN: 0
BREAST MASS: 0
HEMATURIA: 0
DIARRHEA: 0
DIZZINESS: 0
CHILLS: 0
PARESTHESIAS: 0
EYE PAIN: 0
NAUSEA: 0
PALPITATIONS: 0
DYSURIA: 0
FEVER: 0
CONSTIPATION: 0
HEARTBURN: 0
NERVOUS/ANXIOUS: 1
HEMATOCHEZIA: 0
COUGH: 0
SHORTNESS OF BREATH: 0
ARTHRALGIAS: 0
SORE THROAT: 0
WEAKNESS: 0

## 2022-10-10 ASSESSMENT — ANXIETY QUESTIONNAIRES
7. FEELING AFRAID AS IF SOMETHING AWFUL MIGHT HAPPEN: SEVERAL DAYS
2. NOT BEING ABLE TO STOP OR CONTROL WORRYING: SEVERAL DAYS
GAD7 TOTAL SCORE: 10
8. IF YOU CHECKED OFF ANY PROBLEMS, HOW DIFFICULT HAVE THESE MADE IT FOR YOU TO DO YOUR WORK, TAKE CARE OF THINGS AT HOME, OR GET ALONG WITH OTHER PEOPLE?: SOMEWHAT DIFFICULT
GAD7 TOTAL SCORE: 10
GAD7 TOTAL SCORE: 10
5. BEING SO RESTLESS THAT IT IS HARD TO SIT STILL: SEVERAL DAYS
6. BECOMING EASILY ANNOYED OR IRRITABLE: SEVERAL DAYS
IF YOU CHECKED OFF ANY PROBLEMS ON THIS QUESTIONNAIRE, HOW DIFFICULT HAVE THESE PROBLEMS MADE IT FOR YOU TO DO YOUR WORK, TAKE CARE OF THINGS AT HOME, OR GET ALONG WITH OTHER PEOPLE: SOMEWHAT DIFFICULT
7. FEELING AFRAID AS IF SOMETHING AWFUL MIGHT HAPPEN: SEVERAL DAYS
1. FEELING NERVOUS, ANXIOUS, OR ON EDGE: MORE THAN HALF THE DAYS
4. TROUBLE RELAXING: MORE THAN HALF THE DAYS
3. WORRYING TOO MUCH ABOUT DIFFERENT THINGS: MORE THAN HALF THE DAYS

## 2022-10-10 ASSESSMENT — ASTHMA QUESTIONNAIRES
ACT_TOTALSCORE: 25
QUESTION_1 LAST FOUR WEEKS HOW MUCH OF THE TIME DID YOUR ASTHMA KEEP YOU FROM GETTING AS MUCH DONE AT WORK, SCHOOL OR AT HOME: NONE OF THE TIME
QUESTION_3 LAST FOUR WEEKS HOW OFTEN DID YOUR ASTHMA SYMPTOMS (WHEEZING, COUGHING, SHORTNESS OF BREATH, CHEST TIGHTNESS OR PAIN) WAKE YOU UP AT NIGHT OR EARLIER THAN USUAL IN THE MORNING: NOT AT ALL
ACT_TOTALSCORE: 25
QUESTION_5 LAST FOUR WEEKS HOW WOULD YOU RATE YOUR ASTHMA CONTROL: COMPLETELY CONTROLLED
QUESTION_4 LAST FOUR WEEKS HOW OFTEN HAVE YOU USED YOUR RESCUE INHALER OR NEBULIZER MEDICATION (SUCH AS ALBUTEROL): NOT AT ALL
QUESTION_2 LAST FOUR WEEKS HOW OFTEN HAVE YOU HAD SHORTNESS OF BREATH: NOT AT ALL

## 2022-10-10 ASSESSMENT — PATIENT HEALTH QUESTIONNAIRE - PHQ9
10. IF YOU CHECKED OFF ANY PROBLEMS, HOW DIFFICULT HAVE THESE PROBLEMS MADE IT FOR YOU TO DO YOUR WORK, TAKE CARE OF THINGS AT HOME, OR GET ALONG WITH OTHER PEOPLE: SOMEWHAT DIFFICULT
SUM OF ALL RESPONSES TO PHQ QUESTIONS 1-9: 10
SUM OF ALL RESPONSES TO PHQ QUESTIONS 1-9: 10

## 2022-10-10 ASSESSMENT — PAIN SCALES - GENERAL: PAINLEVEL: NO PAIN (0)

## 2022-10-10 NOTE — PROGRESS NOTES
SUBJECTIVE:   CC: Oumou is an 57 year old who presents for preventive health visit.       Patient has been advised of split billing requirements and indicates understanding: Yes  Healthy Habits:     Getting at least 3 servings of Calcium per day:  Yes    Bi-annual eye exam:  Yes    Dental care twice a year:  Yes    Sleep apnea or symptoms of sleep apnea:  None    Diet:  Regular (no restrictions)    Frequency of exercise:  4-5 days/week    Duration of exercise:  30-45 minutes    Taking medications regularly:  Yes    Medication side effects:  None    PHQ-2 Total Score: 2    Additional concerns today:  Yes      Mood- very difficult with grief and dealing with ex-partner's estate and family. High anxiety about this. Feeling cyclic as at a year imelda.   Is open to grief support group. Does not want to pay for counseling.   Due for med check.   Has a puppy- helping her mood.           Today's PHQ-2 Score:   PHQ-2 ( 1999 [employer]) 10/10/2022   Q1: Little interest or pleasure in doing things 1   Q2: Feeling down, depressed or hopeless 1   PHQ-2 Score 2   PHQ-2 Total Score (12-17 Years)- Positive if 3 or more points; Administer PHQ-A if positive -   Q1: Little interest or pleasure in doing things Several days   Q2: Feeling down, depressed or hopeless Several days   PHQ-2 Score 2       Abuse: Current or Past (Physical, Sexual or Emotional) - No  Do you feel safe in your environment? Yes        Social History     Tobacco Use     Smoking status: Never     Smokeless tobacco: Never   Substance Use Topics     Alcohol use: No     Comment: none at all          Alcohol Use 10/10/2022   Prescreen: >3 drinks/day or >7 drinks/week? Not Applicable   Prescreen: >3 drinks/day or >7 drinks/week? -       Reviewed orders with patient.  Reviewed health maintenance and updated orders accordingly - Yes  Lab work is in process    Breast Cancer Screening:    Breast CA Risk Assessment (FHS-7) 10/10/2022   Do you have a family history of breast,  "colon, or ovarian cancer? No / Unknown         Mammogram Screening: Recommended mammography every 1-2 years with patient discussion and risk factor consideration  Pertinent mammograms are reviewed under the imaging tab.    History of abnormal Pap smear: Status post benign hysterectomy. Health Maintenance and Surgical History updated.     Reviewed and updated as needed this visit by clinical staff   Tobacco  Allergies  Meds   Med Hx  Surg Hx  Fam Hx  Soc Hx        Reviewed and updated as needed this visit by Provider                 Granddaughter 13  Grandson 1    Review of Systems   Constitutional: Negative for chills and fever.   HENT: Negative for congestion, ear pain, hearing loss and sore throat.    Eyes: Negative for pain and visual disturbance.   Respiratory: Negative for cough and shortness of breath.    Cardiovascular: Negative for chest pain, palpitations and peripheral edema.   Gastrointestinal: Negative for abdominal pain, constipation, diarrhea, heartburn, hematochezia and nausea.   Breasts:  Negative for tenderness, breast mass and discharge.   Genitourinary: Negative for dysuria, frequency, genital sores, hematuria, pelvic pain, urgency, vaginal bleeding and vaginal discharge.   Musculoskeletal: Negative for arthralgias, joint swelling and myalgias.   Skin: Negative for rash.   Neurological: Negative for dizziness, weakness, headaches and paresthesias.   Psychiatric/Behavioral: Negative for mood changes. The patient is nervous/anxious.           OBJECTIVE:   /80   Pulse 66   Temp 98.5  F (36.9  C) (Temporal)   Resp 12   Ht 1.676 m (5' 6\")   Wt 88.9 kg (196 lb)   LMP  (LMP Unknown)   SpO2 98%   BMI 31.64 kg/m    Physical Exam  GENERAL: healthy, alert and no distress  EYES: Eyes grossly normal to inspection, PERRL and conjunctivae and sclerae normal  HENT: ear canals and TM's normal, nose and mouth without ulcers or lesions  NECK: no adenopathy, no asymmetry, masses, or scars and " thyroid normal to palpation  RESP: lungs clear to auscultation - no rales, rhonchi or wheezes  BREAST: normal without masses, tenderness or nipple discharge and no palpable axillary masses or adenopathy  CV: regular rate and rhythm, normal S1 S2, no S3 or S4, no murmur, click or rub, no peripheral edema and peripheral pulses strong, varicose veins noted.   ABDOMEN: soft, nontender, no hepatosplenomegaly, no masses and bowel sounds normal  MS: no gross musculoskeletal defects noted, no edema  SKIN: no suspicious lesions or rashes  NEURO: Normal strength and tone, mentation intact and speech normal  PSYCH: mentation appears normal, affect normal/bright    Diagnostic Test Results:  Labs reviewed in Epic  No results found for this or any previous visit (from the past 24 hour(s)).    ASSESSMENT/PLAN:       ICD-10-CM    1. Routine general medical examination at a health care facility  Z00.00 CBC with Platelets & Differential     Hemoglobin A1c     TSH with free T4 reflex     CBC with Platelets & Differential     Hemoglobin A1c     TSH with free T4 reflex      2. Hyperlipidemia LDL goal <160  E78.5 Lipid panel reflex to direct LDL Non-fasting     Lipid panel reflex to direct LDL Non-fasting      3. Mild intermittent asthma with allergic rhinitis without complication  J45.20       4. Moderate recurrent major depression (H)  F33.1 FLUoxetine (PROZAC) 40 MG capsule      5. Seasonal allergic rhinitis due to other allergic trigger  J30.89       6. Screening for colon cancer  Z12.11 Colonoscopy Screening  Referral      7. Screening for hyperlipidemia  Z13.220 Lipid panel reflex to direct LDL Non-fasting     Lipid panel reflex to direct LDL Non-fasting      8. Asymptomatic postmenopausal status  Z78.0 DX Hip/Pelvis/Spine      9. Class 1 obesity due to excess calories with serious comorbidity in adult, unspecified BMI  E66.09           Patient has been advised of split billing requirements and indicates understanding:  "Yes    COUNSELING:  Reviewed preventive health counseling, as reflected in patient instructions       Regular exercise       Healthy diet/nutrition       Immunizations    See orders             Osteoporosis prevention/bone health       Colorectal Cancer Screening       Mammogram  Dexa advised- calcium supplement.     Mood- a bit worse, increasing Prozac to 40 mg. counseling prn, looking at support/grief group.   High cost. Healthy habits.   Re-check in 4 weeks.     Asthma stable, has albuterol.     Estimated body mass index is 31.64 kg/m  as calculated from the following:    Height as of this encounter: 1.676 m (5' 6\").    Weight as of this encounter: 88.9 kg (196 lb).    Weight management plan: Discussed healthy diet and exercise guidelines and emotional health declined nutrition referral.     She reports that she has never smoked. She has never used smokeless tobacco.      Counseling Resources:  ATP IV Guidelines  Pooled Cohorts Equation Calculator  Breast Cancer Risk Calculator  BRCA-Related Cancer Risk Assessment: FHS-7 Tool  FRAX Risk Assessment  ICSI Preventive Guidelines  Dietary Guidelines for Americans, 2010  [employer]'s MyPlate  ASA Prophylaxis  Lung CA Screening    TORIBIO [facility] DONELL  Answers for HPI/ROS submitted by the patient on 10/10/2022  If you checked off any problems, how difficult have these problems made it for you to do your work, take care of things at home, or get along with other people?: Somewhat difficult  PHQ9 TOTAL SCORE: 10  MICHELLE 7 TOTAL SCORE: 10      "

## 2022-10-10 NOTE — RESULT ENCOUNTER NOTE
Oumou,  Your labs that have returned are normal. More labs are still processing. Florinda Eaton, DNP

## 2022-10-10 NOTE — RESULT ENCOUNTER NOTE
Milton Coello,   Labs are holding steady.   Keep working at healthy habits. Cholesterol is a bit better this year!   Sincerely,   Florinda Eaton, DNP

## 2022-11-05 ENCOUNTER — TELEPHONE (OUTPATIENT)
Dept: FAMILY MEDICINE | Facility: CLINIC | Age: 57
End: 2022-11-05

## 2022-11-05 NOTE — TELEPHONE ENCOUNTER
Patient Quality Outreach    Patient is due for the following:   Colon Cancer Screening  Depression  -  PHQ-9 needed      Topic Date Due     Hepatitis B Vaccine (1 of 3 - 3-dose series) Never done     COVID-19 Vaccine (3 - Booster for Moderna series) 07/16/2021       Next Steps:   Patient has upcoming appointment, these items will be addressed at that time.    Type of outreach:    Chart review performed, no outreach needed.      Questions for provider review:    None     Sandra Bundy, CMA

## 2022-11-10 ENCOUNTER — MYC REFILL (OUTPATIENT)
Dept: FAMILY MEDICINE | Facility: CLINIC | Age: 57
End: 2022-11-10

## 2022-11-10 DIAGNOSIS — F33.1 MODERATE RECURRENT MAJOR DEPRESSION (H): ICD-10-CM

## 2022-11-15 RX ORDER — FLUOXETINE 40 MG/1
40 CAPSULE ORAL DAILY
Qty: 30 CAPSULE | Refills: 0 | Status: SHIPPED | OUTPATIENT
Start: 2022-11-15 | End: 2022-12-01

## 2022-11-15 NOTE — TELEPHONE ENCOUNTER
"Pending Prescriptions:                       Disp   Refills    FLUoxetine (PROZAC) 40 MG capsule          90 cap*0        Sig: Take 1 capsule (40 mg) by mouth daily    Routing refill request to provider for review/approval because:  PHQ-9 score:    PHQ 10/10/2022   PHQ-9 Total Score 10   Q9: Thoughts of better off dead/self-harm past 2 weeks Not at all                 Requested Prescriptions   Pending Prescriptions Disp Refills    FLUoxetine (PROZAC) 40 MG capsule 90 capsule 0     Sig: Take 1 capsule (40 mg) by mouth daily       SSRIs Protocol Failed - 11/10/2022  7:18 AM        Failed - PHQ-9 score less than 5 in past 6 months     Please review last PHQ-9 score.           Passed - Medication is active on med list        Passed - Patient is age 18 or older        Passed - No active pregnancy on record        Passed - No positive pregnancy test in last 12 months        Passed - Recent (6 mo) or future (30 days) visit within the authorizing provider's specialty     Patient had office visit in the last 6 months or has a visit in the next 30 days with authorizing provider or within the authorizing provider's specialty.  See \"Patient Info\" tab in inbasket, or \"Choose Columns\" in Meds & Orders section of the refill encounter.                       "

## 2022-11-15 NOTE — TELEPHONE ENCOUNTER
1 month given, Needs to be seen for further refills. Virtual video- preferred.   Florinda Eaton, KRISSY

## 2022-12-01 ENCOUNTER — VIRTUAL VISIT (OUTPATIENT)
Dept: FAMILY MEDICINE | Facility: CLINIC | Age: 57
End: 2022-12-01
Payer: COMMERCIAL

## 2022-12-01 ENCOUNTER — MYC MEDICAL ADVICE (OUTPATIENT)
Dept: FAMILY MEDICINE | Facility: CLINIC | Age: 57
End: 2022-12-01

## 2022-12-01 DIAGNOSIS — F33.1 MODERATE RECURRENT MAJOR DEPRESSION (H): ICD-10-CM

## 2022-12-01 DIAGNOSIS — F43.10 POST-TRAUMATIC STRESS DISORDER, UNSPECIFIED: Primary | ICD-10-CM

## 2022-12-01 PROCEDURE — 99214 OFFICE O/P EST MOD 30 MIN: CPT | Mod: GT | Performed by: NURSE PRACTITIONER

## 2022-12-01 RX ORDER — FLUOXETINE 40 MG/1
40 CAPSULE ORAL DAILY
Qty: 90 CAPSULE | Refills: 1 | Status: SHIPPED | OUTPATIENT
Start: 2022-12-01 | End: 2023-07-31

## 2022-12-01 ASSESSMENT — PATIENT HEALTH QUESTIONNAIRE - PHQ9
SUM OF ALL RESPONSES TO PHQ QUESTIONS 1-9: 9
SUM OF ALL RESPONSES TO PHQ QUESTIONS 1-9: 9
10. IF YOU CHECKED OFF ANY PROBLEMS, HOW DIFFICULT HAVE THESE PROBLEMS MADE IT FOR YOU TO DO YOUR WORK, TAKE CARE OF THINGS AT HOME, OR GET ALONG WITH OTHER PEOPLE: SOMEWHAT DIFFICULT

## 2022-12-01 ASSESSMENT — ANXIETY QUESTIONNAIRES
1. FEELING NERVOUS, ANXIOUS, OR ON EDGE: SEVERAL DAYS
7. FEELING AFRAID AS IF SOMETHING AWFUL MIGHT HAPPEN: SEVERAL DAYS
IF YOU CHECKED OFF ANY PROBLEMS ON THIS QUESTIONNAIRE, HOW DIFFICULT HAVE THESE PROBLEMS MADE IT FOR YOU TO DO YOUR WORK, TAKE CARE OF THINGS AT HOME, OR GET ALONG WITH OTHER PEOPLE: SOMEWHAT DIFFICULT
3. WORRYING TOO MUCH ABOUT DIFFERENT THINGS: MORE THAN HALF THE DAYS
8. IF YOU CHECKED OFF ANY PROBLEMS, HOW DIFFICULT HAVE THESE MADE IT FOR YOU TO DO YOUR WORK, TAKE CARE OF THINGS AT HOME, OR GET ALONG WITH OTHER PEOPLE?: SOMEWHAT DIFFICULT
4. TROUBLE RELAXING: MORE THAN HALF THE DAYS
GAD7 TOTAL SCORE: 9
GAD7 TOTAL SCORE: 9
5. BEING SO RESTLESS THAT IT IS HARD TO SIT STILL: SEVERAL DAYS
6. BECOMING EASILY ANNOYED OR IRRITABLE: SEVERAL DAYS
GAD7 TOTAL SCORE: 9
7. FEELING AFRAID AS IF SOMETHING AWFUL MIGHT HAPPEN: SEVERAL DAYS
2. NOT BEING ABLE TO STOP OR CONTROL WORRYING: SEVERAL DAYS

## 2022-12-01 NOTE — LETTER
December 5, 2022      Oumou Carolina  [address] [address]              Hi Florinda Coello stated to have you schedule a video visit in 6 weeks with her to recheck your mood and medication. You can schedule this on [employer] or by calling us at [phone] opt 2.    She also wanted us to remind you to schedule your colonoscopy. You can schedule this by calling [phone].     Thank you and have a great night!

## 2022-12-01 NOTE — PROGRESS NOTES
Patient completed E-Check in. Questionnaires blown in and patient checked in without being called.     Oumou is a 57 year old who is being evaluated via a billable video visit.      How would you like to obtain your AVS?   If the video visit is dropped, the invitation should be resent by:   Will anyone else be joining your video visit?           Assessment & Plan     Moderate recurrent major depression (H)  Stable, not improving.   - FLUoxetine (PROZAC) 40 MG capsule; Take 1 capsule (40 mg) by mouth daily    Post-traumatic stress disorder, unspecified  Pt. not really wanting to increase medication. I suspect components of PTSD- advising EMDR therapy for healing. Pt. Will discuss with her counselor.   Re-check in 6 weeks.   Coping techniques discussed.   Return to clinic with any new or worsening symptoms, and as needed.                MEDICATIONS:  Continue current medications without change    Return in about 6 weeks (around 1/12/2023) for re-check office visit.    TORIBIO [facility] MARLEYBRIGIDO Coello is a 57 year old, presenting for the following health issues:  Recheck Medication      History of Present Illness       Mental Health Follow-up:  Patient presents to follow-up on Depression & Anxiety.Patient's depression since last visit has been:  No change  The patient is not having other symptoms associated with depression.  Patient's anxiety since last visit has been:  No change  The patient is not having other symptoms associated with anxiety.  Any significant life events: grief or loss  Patient is feeling anxious or having panic attacks.  Patient has no concerns about alcohol or drug use.    She eats 2-3 servings of fruits and vegetables daily.She consumes 0 sweetened beverage(s) daily.She exercises with enough effort to increase her heart rate 30 to 60 minutes per day.  She exercises with enough effort to increase her heart rate 5 days per week.   She is taking  medications regularly.    Today's PHQ-9         PHQ-9 Total Score: 9    PHQ-9 Q9 Thoughts of better off dead/self-harm past 2 weeks :   Not at all    How difficult have these problems made it for you to do your work, take care of things at home, or get along with other people: Somewhat difficult  Today's MICHELLE-7 Score: 9     Having hard time processing thoughts, difficulty concentrating. More tearful around time of her 's passing.   Is in counseling. If functioning but not thriving.   Not sure if medication is helpful.           Review of Systems   Constitutional, HEENT, cardiovascular, pulmonary, gi and gu systems are negative, except as otherwise noted.      Objective           Vitals:  No vitals were obtained today due to virtual visit.    Physical Exam   GENERAL: Healthy, alert and no distress  EYES: Eyes grossly normal to inspection.  No discharge or erythema, or obvious scleral/conjunctival abnormalities.  RESP: No audible wheeze, cough, or visible cyanosis.  No visible retractions or increased work of breathing.    SKIN: Visible skin clear. No significant rash, abnormal pigmentation or lesions.  NEURO: Cranial nerves grossly intact.  Mentation and speech appropriate for age.  PSYCH: mentation appears normal, affect flat, judgement and insight intact and appearance well groomed                Video-Visit Details    Video Start Time: 1605    Type of service:  Video Visit    Video End Time:1628    Originating Location (pt. Location): Home    Distant Location (provider location):  Off-site    Platform used for Video Visit: Lele

## 2022-12-01 NOTE — TELEPHONE ENCOUNTER
Per virtual visit checkout:    Visit Disposition    Check-out Note   Call pt. Advise video visit in 6 weeks, mood re-check.   Advise to schedule colonoscopy as well- reminder.    [employer] sent    Sandra Bundy CMA ([facility])

## 2022-12-12 ENCOUNTER — OFFICE VISIT (OUTPATIENT)
Dept: DERMATOLOGY | Facility: CLINIC | Age: 57
End: 2022-12-12
Payer: COMMERCIAL

## 2022-12-12 DIAGNOSIS — D48.5 NEOPLASM OF UNCERTAIN BEHAVIOR OF SKIN: ICD-10-CM

## 2022-12-12 PROCEDURE — 11104 PUNCH BX SKIN SINGLE LESION: CPT | Performed by: DERMATOLOGY

## 2022-12-12 PROCEDURE — 88305 TISSUE EXAM BY PATHOLOGIST: CPT | Performed by: PATHOLOGY

## 2022-12-12 ASSESSMENT — PAIN SCALES - GENERAL: PAINLEVEL: NO PAIN (0)

## 2022-12-12 NOTE — NURSING NOTE
Oumou Carolina's goals for this visit include:   Chief Complaint   Patient presents with     Derm Problem     Recheck of hypopigmented macule on left philtrum.  Bleeds occasionally.  Hasn't changed since she saw Dr. Peters.       She requests these members of her care team be copied on today's visit information: n/a    PCP: Florinda Eaton    Referring Provider:  No referring provider defined for this encounter.    There were no vitals taken for this visit.    Do you need any medication refills at today's visit? No  Paz Chaney RN

## 2022-12-12 NOTE — PATIENT INSTRUCTIONS
Wound Care After a Biopsy    What is a skin biopsy?  A skin biopsy allows the doctor to examine a very small piece of tissue under the microscope to determine the diagnosis and the best treatment for the skin condition. A local anesthetic (numbing medicine)  is injected with a very small needle into the skin area to be tested. A small piece of skin is taken from the area. Sometimes a suture (stitch) is used.     What are the risks of a skin biopsy?  I will experience scar, bleeding, swelling, pain, crusting and redness. I may experience incomplete removal or recurrence. Risks of this procedure are excessive bleeding, bruising, infection, nerve damage, numbness, thick (hypertrophic or keloidal) scar and non-diagnostic biopsy.    How should I care for my wound for the first 24 hours?  Keep the wound dry and covered for 24 hours  If it bleeds, hold direct pressure on the area for 15 minutes. If bleeding does not stop then go to the emergency room  Avoid strenuous exercise the first 1-2 days or as your doctor instructs you    How should I care for the wound after 24 hours?  After 24 hours, remove the bandage  You may bathe or shower as normal  If you had a scalp biopsy, you can shampoo as usual and can use shower water to clean the biopsy site daily  Clean the wound twice a day with gentle soap and water  Do not scrub, be gentle  Apply white petroleum/Vaseline after cleaning the wound with a cotton swab or a clean finger, and keep the site covered with a Bandaid /bandage. Bandages are not necessary with a scalp biopsy  If you are unable to cover the site with a Bandaid /bandage, re-apply ointment 2-3 times a day to keep the site moist. Moisture will help with healing  Avoid strenuous activity for first 1-2 days  Avoid lakes, rivers, pools, and oceans until the stitches are removed or the site is healed    How do I clean my wound?  Wash hands thoroughly with soap or use hand  before all wound care  Clean the  wound with gentle soap and water  Apply white petroleum/Vaseline  to wound after it is clean  Replace the Bandaid /bandage to keep the wound covered for the first few days or as instructed by your doctor  If you had a scalp biopsy, warm shower water to the area on a daily basis should suffice    What should I use to clean my wound?   Cotton-tipped applicators (Qtips )  White petroleum jelly (Vaseline ). Use a clean new container and use Q-tips to apply.  Bandaids   as needed  Gentle soap     How should I care for my wound long term?  Do not get your wound dirty  Keep up with wound care for one week or until the area is healed.  A small scab will form and fall off by itself when the area is completely healed. The area will be red and will become pink in color as it heals. Sun protection is very important for how your scar will turn out. Sunscreen with an SPF 30 or greater is recommended once the area is healed.  If you have stitches, they will dissolve over the next few weeks. You may return to our clinic for this or you may have it done locally at your doctor s office.  You should have some soreness but it should be mild and slowly go away over several days. Talk to your doctor about using tylenol for pain,    When should I call my doctor?  If you have increased:   Pain or swelling  Pus or drainage (clear or slightly yellow drainage is ok)  Temperature over 100F  Spreading redness or warmth around wound    When will I hear about my results?  The biopsy results can take 2 weeks to come back.  Your results will automatically release to [employer] before your provider has even reviewed them.  The clinic will call you with the results, send you a [employer] message, or have you schedule a follow-up clinic or phone time to discuss the results.  Contact our clinics if you do not hear from us in 2 weeks.    Who should I call with questions?  [facility]: [phone]  [facility]  [facility]: [phone]  For urgent needs outside of business hours call the [facility] at [phone] and ask for the dermatology resident on call

## 2022-12-12 NOTE — PROGRESS NOTES
[facility] Dermatology Note  Encounter Date: Dec 12, 2022  Office Visit     Dermatology Problem List:  Last skin check 9/23/22, due yearly  0. NUB L philtrum; s/p biopsy 12/12/22    NUB - left forehead, s/p bx 9/23/22 - superficial biopsy, asymptomatic and clinically consistent with biopsy scar 12/12/22    1. Family history of melanoma (mother)  2. NMSC  - BCC left jaw line, s/p excision 8/27/2018  - BCC, central abdomen, s/p excision 11/25/2015  - BCC, nodular, left frontal scalp, s/p Mohs 2/10/15  - SCCIS, left chest, s/p excision 2/10/15  - BCC, nodular with fibrosis, right chest, s/p excision 11/4/14   3. Actinic Keratosis - s/p cryotherapy, PDT 2019 x 3, Efudex 3/2015  - HAK, left medial cheek, s/p bx 11/30/21, s/p cryo then efudex  - AK, left infraorbital, s/p bx 11/30/21, resolved  - Pigmented AK, central chest, s/p bx 11/30/21, s/p cryo 1/21/22  - Pigmented AK, left chest, s/p bx 8/10/18  4. Seborrheic dermatitis  - Lidex 8/2018  5. Folliculitis, central chest, s/p biopsy 8/10/2018  6. EIC vs dermatofibroma, mid central back  7. Epidermoid cyst, back, s/p excision 2/21/19      Social History -  past away 12/2021     ____________________________________________     Assessment & Plan:     #  Neoplasm of unspecified behavior of the skin (D49.2) on the left forehead.   Biopsy was superficial but unrevealing for skin cancer.   This site is now asymptomatic.  Exam consistent with biopsy scar.   Patient elects to monitor for recurrence or symptoms.       # Neoplasm of uncertain behavior of skin, left philtrum   History may suggest ruptured follicle or small cyst, but recurrent bleeding with slow healing may suggest BCC. Punch biopsy today.     Punch biopsy:  After discussion of benefits and risks including but not limited to bleeding/bruising, pain/swelling, infection, scar, incomplete removal, nerve damage/numbness, recurrence, and non-diagnostic biopsy, written consent, verbal consent  and photographs were obtained. Time-out was performed. The area was cleaned with isopropyl alcohol. 0.5mL of 1% lidocaine with epinephrine was injected to obtain adequate anesthesia of the lesion. A 3 mm punch biopsy was performed.  5-0 fast absorbing gut sutures were utilized to approximate the epidermal edges.  White petroleum jelly/VaselineTM and a bandage was applied to the wound.  Explicit verbal and written wound care instructions were provided.  The patient left the Dermatology Clinic in good condition. The patient was counseled to follow up for suture removal PRN.     Follow-up: pending biopsy results.     Staff and Scribe:     Scribe Disclosure:   I, Srinivasa Candelaria, am serving as a scribe to document services personally performed by this physician, Dr. Will Queen, based on data collection and the provider's statements to me.       Provider Disclosure:   The documentation recorded by the scribe accurately reflects the services I personally performed and the decisions made by me.  I personally performed the procedures today.    Indra Hyde DO    Department of Dermatology  [facility]: Phone: [phone], Fax:[phone]  [facility]: Phone: [phone], Fax: [phone]    ____________________________________________    CC: Derm Problem (Recheck of hypopigmented macule on left philtrum.  Bleeds occasionally.  Hasn't changed since she saw Dr. Peters.)    HPI:  Ms. Oumou Carolina is a(n) 57 year old female who presents today as a return patient for evaluation of Neoplasm of uncertain behavior of skin.     Last seen 9/23/22 by Dr. Peters for a skin check.     Today, she reports a papule has been present on her left philtrum for at least 1 year.  Intermittently (every month or 2) it will become inflamed and start to bleed.  Bleeding it tends to persist for at least 1 week then  resolve.  At times she has been able to expressed a firm white material.  It has been relatively consistent in size. It is not painful.       Labs Reviewed:  N/A    Physical Exam:  Vitals: There were no vitals taken for this visit.  SKIN: Focused examination of upper lip was performed.  - L philtrum, inferior, ~3mm firm skin colored papule. No cyst capsule or punctum visible. No ulceration.   - No other lesions of concern on areas examined.     Medications:  Current Outpatient Medications   Medication     albuterol (PROAIR HFA) 108 (90 Base) MCG/ACT inhaler     FLUoxetine (PROZAC) 40 MG capsule     fluticasone (FLONASE) 50 MCG/ACT nasal spray     No current facility-administered medications for this visit.      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 2-10-15     Basal cell carcinoma of left frontal scalp (hairline) s/p mohs 2-10-15     Elevated blood pressure reading without diagnosis of hypertension     History of nonmelanoma skin cancer     Actinic keratosis     Past Medical History:   Diagnosis Date     Allergy, unspecified not elsewhere classified 2003    s/p desensitization     Anxiety      Atrial tachycardia (H)     ablation 3/24/11     Bartholin's abscess 7/09     Basal cell carcinoma of anterior chest s/p excision 11-4-14 11/4/2014     Bradycardia      Excessive or frequent menstruation 2001    s/p TVH     Transient disorder of initiating or maintaining sleep 2005

## 2022-12-12 NOTE — LETTER
12/12/2022         RE: Oumou Carolina  [address] [address]        Dear Colleague,    Thank you for referring your patient, Oumou Carolina, to the [facility]. Please see a copy of my visit note below.    [facility] Dermatology Note  Encounter Date: Dec 12, 2022  Office Visit     Dermatology Problem List:  Last skin check 9/23/22, due yearly  0. NUB L philtrum; s/p biopsy 12/12/22    NUB - left forehead, s/p bx 9/23/22 - superficial biopsy, asymptomatic and clinically consistent with biopsy scar 12/12/22    1. Family history of melanoma (mother)  2. NMSC  - BCC left jaw line, s/p excision 8/27/2018  - BCC, central abdomen, s/p excision 11/25/2015  - BCC, nodular, left frontal scalp, s/p Mohs 2/10/15  - SCCIS, left chest, s/p excision 2/10/15  - BCC, nodular with fibrosis, right chest, s/p excision 11/4/14   3. Actinic Keratosis - s/p cryotherapy, PDT 2019 x 3, Efudex 3/2015  - HAK, left medial cheek, s/p bx 11/30/21, s/p cryo then efudex  - AK, left infraorbital, s/p bx 11/30/21, resolved  - Pigmented AK, central chest, s/p bx 11/30/21, s/p cryo 1/21/22  - Pigmented AK, left chest, s/p bx 8/10/18  4. Seborrheic dermatitis  - Lidex 8/2018  5. Folliculitis, central chest, s/p biopsy 8/10/2018  6. EIC vs dermatofibroma, mid central back  7. Epidermoid cyst, back, s/p excision 2/21/19      Social History -  past away 12/2021     ____________________________________________     Assessment & Plan:     #  Neoplasm of unspecified behavior of the skin (D49.2) on the left forehead.   Biopsy was superficial but unrevealing for skin cancer.   This site is now asymptomatic.  Exam consistent with biopsy scar.   Patient elects to monitor for recurrence or symptoms.       # Neoplasm of uncertain behavior of skin, left philtrum   History may suggest ruptured follicle or small cyst, but recurrent bleeding with slow healing may suggest BCC. Punch biopsy today.      Punch biopsy:  After discussion of benefits and risks including but not limited to bleeding/bruising, pain/swelling, infection, scar, incomplete removal, nerve damage/numbness, recurrence, and non-diagnostic biopsy, written consent, verbal consent and photographs were obtained. Time-out was performed. The area was cleaned with isopropyl alcohol. 0.5mL of 1% lidocaine with epinephrine was injected to obtain adequate anesthesia of the lesion. A 3 mm punch biopsy was performed.  5-0 fast absorbing gut sutures were utilized to approximate the epidermal edges.  White petroleum jelly/VaselineTM and a bandage was applied to the wound.  Explicit verbal and written wound care instructions were provided.  The patient left the Dermatology Clinic in good condition. The patient was counseled to follow up for suture removal PRN.     Follow-up: pending biopsy results.     Staff and Scribe:     Scribe Disclosure:   I, Srinivasa Candelaria, am serving as a scribe to document services personally performed by this physician, Dr. Will Queen, based on data collection and the provider's statements to me.       Provider Disclosure:   The documentation recorded by the scribe accurately reflects the services I personally performed and the decisions made by me.  I personally performed the procedures today.    Indra Hyde DO    Department of Dermatology  [facility]: Phone: [phone], Fax:[phone]  [facility]: Phone: [phone], Fax: [phone]    ____________________________________________    CC: Derm Problem (Recheck of hypopigmented macule on left philtrum.  Bleeds occasionally.  Hasn't changed since she saw Dr. Peters.)    HPI:  Ms. Oumou Carolina is a(n) 57 year old female who presents today as a return patient for evaluation of Neoplasm of uncertain behavior of skin.     Last seen 9/23/22 by   Peters for a skin check.     Today, she reports a papule has been present on her left philtrum for at least 1 year.  Intermittently (every month or 2) it will become inflamed and start to bleed.  Bleeding it tends to persist for at least 1 week then resolve.  At times she has been able to expressed a firm white material.  It has been relatively consistent in size. It is not painful.       Labs Reviewed:  N/A    Physical Exam:  Vitals: There were no vitals taken for this visit.  SKIN: Focused examination of upper lip was performed.  - L philtrum, inferior, ~3mm firm skin colored papule. No cyst capsule or punctum visible. No ulceration.   - No other lesions of concern on areas examined.     Medications:  Current Outpatient Medications   Medication     albuterol (PROAIR HFA) 108 (90 Base) MCG/ACT inhaler     FLUoxetine (PROZAC) 40 MG capsule     fluticasone (FLONASE) 50 MCG/ACT nasal spray     No current facility-administered medications for this visit.      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 2-10-15     Basal cell carcinoma of left frontal scalp (hairline) s/p mohs 2-10-15     Elevated blood pressure reading without diagnosis of hypertension     History of nonmelanoma skin cancer     Actinic keratosis     Past Medical History:   Diagnosis Date     Allergy, unspecified not elsewhere classified 2003    s/p desensitization     Anxiety      Atrial tachycardia (H)     ablation 3/24/11     Bartholin's abscess 7/09     Basal cell carcinoma of anterior chest s/p excision 11-4-14 11/4/2014     Bradycardia      Excessive or frequent menstruation 2001    s/p TVH     Transient disorder of initiating or maintaining sleep 2005             Again, thank you for allowing me to participate in the care of your patient.        Sincerely,        Indra Hyde MD

## 2022-12-12 NOTE — NURSING NOTE
The following medication was given:     MEDICATION:  Lidocaine with epinephrine 1% [id number]  ROUTE: SQ  SITE: see procedure note  DOSE: 0.5cc  LOT #: [id number]  : tok tok tok  EXPIRATION DATE: 11/2023  NDC#: 10721-959-55  Was there drug waste? 1.5cc  Multi-dose vial: Yes    Nicole Mims LPN  December 12, 2022

## 2022-12-14 LAB
PATH REPORT.COMMENTS IMP SPEC: NORMAL
PATH REPORT.COMMENTS IMP SPEC: NORMAL
PATH REPORT.FINAL DX SPEC: NORMAL
PATH REPORT.GROSS SPEC: NORMAL
PATH REPORT.MICROSCOPIC SPEC OTHER STN: NORMAL
PATH REPORT.RELEVANT HX SPEC: NORMAL

## 2022-12-27 ENCOUNTER — TELEPHONE (OUTPATIENT)
Dept: DERMATOLOGY | Facility: CLINIC | Age: 57
End: 2022-12-27

## 2022-12-27 NOTE — TELEPHONE ENCOUNTER
"Component  Resulting Agency   Case Report   Surgical Pathology Report                         Case: [id number]                                   Authorizing Provider:  Indra Hyde MD         Collected:           12/12/2022 03:00 PM           Ordering Location:     [facility]   Received:            12/12/2022 04:17 PM                                  [place]                                                                   Pathologist:           Fortunato South MD                                                       Specimen:    Skin, L philtrum                                                                           Final Diagnosis   A(1). Skin, L philtrum, punch:  - Benign follicular tumor, most consistent with pilar sheath acanthoma - (see description)      Electronically signed by Fortunato South MD on 12/14/2022 at  5:59 PM   Clinical Information  [facility]   The patient is a 57 year old female   Gross Description  [facility]   A(1). Skin, L philtrum:  The specimen is received in formalin with proper patient identification, labeled \"L philtral\".  The specimen consists of a 0.3 cm in diameter by 0.2 cm in depth white-tan skin punch.  The subcutaneous surface is inked blue, and the specimen is entirely submitted in cassette A1.     Microscopic Description  [facility]   The specimen exhibits a proliferation of benign appearing squamous epithelium organized into islands and centered around the isthmic aspect of follicular units, with perivascular and interstitial lymphocytic inflammation and mild papillary dermal fibrosis. The lesion is in contiguity with the overlying epidermis.  The lesion extends to the lateral margin.  There is no evidence of malignancy on level sections.    Performing Labs  [facility]   The technical component of this testing was completed at [facility]       Danae Magana CMA   12/27/2022  2:29 PM CST Back to Top "      Spoke with patient to review pathology results.  She states the wound is healing well.  She has no further concerns.  An appointment for her annual skin check was scheduled for 9/23/2022.    Paz Chaney RN   12/20/2022  2:35 PM CST       I left a message for patient to call [facility].  IRAM Garcia MD   12/19/2022  4:16 PM CST       Dr. Peters -- [facility]     Staff, please call the patient with pathology results.     The pathologist was able to confirm the bump on her upper lip was a benign tumor. 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 It is not dangerous and doesn't require treatment if it is not bothersome. The pathologist thought the biopsy got a piece but not all of it. If it grows back or the remainder is bothersome, a little wider excision surgery might make sense.     As long as the wound is healing well, no additional surgery is necessary. Follow up with Dr. Peters for annual skin cancer screening.      Thank you.

## 2023-01-12 ENCOUNTER — VIRTUAL VISIT (OUTPATIENT)
Dept: FAMILY MEDICINE | Facility: CLINIC | Age: 58
End: 2023-01-12
Payer: COMMERCIAL

## 2023-01-12 DIAGNOSIS — Z12.11 SCREEN FOR COLON CANCER: ICD-10-CM

## 2023-01-12 DIAGNOSIS — F33.1 MODERATE RECURRENT MAJOR DEPRESSION (H): Primary | ICD-10-CM

## 2023-01-12 PROCEDURE — 99213 OFFICE O/P EST LOW 20 MIN: CPT | Mod: GT | Performed by: NURSE PRACTITIONER

## 2023-01-12 ASSESSMENT — ANXIETY QUESTIONNAIRES
GAD7 TOTAL SCORE: 9
6. BECOMING EASILY ANNOYED OR IRRITABLE: SEVERAL DAYS
8. IF YOU CHECKED OFF ANY PROBLEMS, HOW DIFFICULT HAVE THESE MADE IT FOR YOU TO DO YOUR WORK, TAKE CARE OF THINGS AT HOME, OR GET ALONG WITH OTHER PEOPLE?: SOMEWHAT DIFFICULT
GAD7 TOTAL SCORE: 9
4. TROUBLE RELAXING: SEVERAL DAYS
5. BEING SO RESTLESS THAT IT IS HARD TO SIT STILL: SEVERAL DAYS
7. FEELING AFRAID AS IF SOMETHING AWFUL MIGHT HAPPEN: SEVERAL DAYS
7. FEELING AFRAID AS IF SOMETHING AWFUL MIGHT HAPPEN: SEVERAL DAYS
2. NOT BEING ABLE TO STOP OR CONTROL WORRYING: SEVERAL DAYS
1. FEELING NERVOUS, ANXIOUS, OR ON EDGE: NEARLY EVERY DAY
IF YOU CHECKED OFF ANY PROBLEMS ON THIS QUESTIONNAIRE, HOW DIFFICULT HAVE THESE PROBLEMS MADE IT FOR YOU TO DO YOUR WORK, TAKE CARE OF THINGS AT HOME, OR GET ALONG WITH OTHER PEOPLE: SOMEWHAT DIFFICULT
3. WORRYING TOO MUCH ABOUT DIFFERENT THINGS: SEVERAL DAYS
GAD7 TOTAL SCORE: 9

## 2023-01-12 ASSESSMENT — PATIENT HEALTH QUESTIONNAIRE - PHQ9
10. IF YOU CHECKED OFF ANY PROBLEMS, HOW DIFFICULT HAVE THESE PROBLEMS MADE IT FOR YOU TO DO YOUR WORK, TAKE CARE OF THINGS AT HOME, OR GET ALONG WITH OTHER PEOPLE: SOMEWHAT DIFFICULT
SUM OF ALL RESPONSES TO PHQ QUESTIONS 1-9: 5
SUM OF ALL RESPONSES TO PHQ QUESTIONS 1-9: 5

## 2023-01-12 ASSESSMENT — ENCOUNTER SYMPTOMS: NERVOUS/ANXIOUS: 1

## 2023-01-12 NOTE — PROGRESS NOTES
Oumou is a 57 year old who is being evaluated via a billable video visit.      How would you like to obtain your AVS? MyChart  If the video visit is dropped, the invitation should be resent by: Text to cell phone: [phone]  Will anyone else be joining your video visit? No        Assessment & Plan     Screen for colon cancer  ordered  - Colonoscopy Screening  Referral; Future    Moderate recurrent major depression (H)  Improved, advise PTSD counseling and EMDR therapy. Pt. Will scheduled.   Continue medications.   Re-check in 6 months.   Return to clinic with any new or worsening symptoms, and as needed.                MEDICATIONS:  Continue current medications without change    No follow-ups on file.    TORIBIO Steen [facility] [place]IGIDO Coello is a 57 year old, presenting for the following health issues:  Depression and Anxiety      Anxiety    History of Present Illness       Mental Health Follow-up:  Patient presents to follow-up on Depression & Anxiety.Patient's depression since last visit has been:  Better  The patient is not having other symptoms associated with depression.  Patient's anxiety since last visit has been:  Medium  The patient is not having other symptoms associated with anxiety.  Any significant life events: grief or loss  Patient is not feeling anxious or having panic attacks.  Patient has no concerns about alcohol or drug use.    She eats 2-3 servings of fruits and vegetables daily.She consumes 0 sweetened beverage(s) daily.She exercises with enough effort to increase her heart rate 30 to 60 minutes per day.  She exercises with enough effort to increase her heart rate 5 days per week.   She is taking medications regularly.    Today's PHQ-9         PHQ-9 Total Score: 5    PHQ-9 Q9 Thoughts of better off dead/self-harm past 2 weeks :   Not at all    How difficult have these problems made it for you to do your work, take care of things at  home, or get along with other people: Somewhat difficult  Today's MICHELLE-7 Score: 9     Doing self-guided journal and grief therapy- considering a group.  Needs new counselor- insurance changes.   Is much calmer since the holidays.     No panic. Sleep is good.   Fairly good motivation  High work stress.   Relationships are strengthening.     Agrees trauma care/counseling is a good idea.         Review of Systems   Psychiatric/Behavioral: The patient is nervous/anxious.             Objective           Vitals:  No vitals were obtained today due to virtual visit.    Physical Exam   GENERAL: Healthy, alert and no distress  EYES: Eyes grossly normal to inspection.  No discharge or erythema, or obvious scleral/conjunctival abnormalities.  RESP: No audible wheeze, cough, or visible cyanosis.  No visible retractions or increased work of breathing.    SKIN: Visible skin clear. No significant rash, abnormal pigmentation or lesions.  NEURO: Cranial nerves grossly intact.  Mentation and speech appropriate for age.  PSYCH: Mentation appears normal, affect normal/bright, judgement and insight intact, normal speech and appearance well-groomed.                Video-Visit Details    Type of service:  Video Visit     Originating Location (pt. Location): Home  Distant Location (provider location):  Off-site  Platform used for Video Visit: Mariaelena

## 2023-04-10 ENCOUNTER — MYC MEDICAL ADVICE (OUTPATIENT)
Dept: FAMILY MEDICINE | Facility: CLINIC | Age: 58
End: 2023-04-10
Payer: COMMERCIAL

## 2023-04-10 NOTE — LETTER
My Asthma Action Plan    Name: Oumou Carolina   YOB: 1965  Date: 4/10/2023   My doctor: TORIBIO Steen CNP   My clinic: [facility]        My Rescue Medicine:   Albuterol inhaler (Proair/Ventolin/Proventil HFA)  2-4 puffs EVERY 4 HOURS as needed. Use a spacer if recommended by your provider.   My Asthma Severity:   Intermittent / Exercise Induced  Know your asthma triggers:  see attached             GREEN ZONE   Good Control  I feel good  No cough or wheeze  Can work, sleep and play without asthma symptoms       Take your asthma control medicine every day.     If exercise triggers your asthma, take your rescue medication  15 minutes before exercise or sports, and  During exercise if you have asthma symptoms  Spacer to use with inhaler: If you have a spacer, make sure to use it with your inhaler             YELLOW ZONE Getting Worse  I have ANY of these:  I do not feel good  Cough or wheeze  Chest feels tight  Wake up at night   Keep taking your Green Zone medications  Start taking your rescue medicine:  every 20 minutes for up to 1 hour. Then every 4 hours for 24-48 hours.  If you stay in the Yellow Zone for more than 12-24 hours, contact your doctor.  If you do not return to the Green Zone in 12-24 hours or you get worse, start taking your oral steroid medicine if prescribed by your provider.           RED ZONE Medical Alert - Get Help  I have ANY of these:  I feel awful  Medicine is not helping  Breathing getting harder  Trouble walking or talking  Nose opens wide to breathe       Take your rescue medicine NOW  If your provider has prescribed an oral steroid medicine, start taking it NOW  Call your doctor NOW  If you are still in the Red Zone after 20 minutes and you have not reached your doctor:  Take your rescue medicine again and  Call 911 or go to the emergency room right away    See your regular doctor within 2 weeks of an Emergency Room or Urgent Care visit for  follow-up treatment.          Annual Reminders:  Meet with Asthma Educator,  Flu Shot in the Fall, consider Pneumonia Vaccination for patients with asthma (aged 19 and older).    Pharmacy:    [id number] - [place] - [address] AT [facility] [address]  [id number] - [place], [place] - [address] AT [facility] OF  [address]    Electronically signed by TORIBIO Steen CNP   Date: 04/10/23                    Asthma Triggers  How To Control Things That Make Your Asthma Worse    Triggers are things that make your asthma worse.  Look at the list below to help you find your triggers and   what you can do about them. You can help prevent asthma flare-ups by staying away from your triggers.      Trigger                                                          What you can do   Cigarette Smoke  Tobacco smoke can make asthma worse. Do not allow smoking in your home, car or around you.  Be sure no one smokes at a child s day care or school.  If you smoke, ask your health care provider for ways to help you quit.  Ask family members to quit too.  Ask your health care provider for a referral to Quit Plan to help you quit smoking, or call 3-226-710-PLAN.     Colds, Flu, Bronchitis  These are common triggers of asthma. Wash your hands often.  Don t touch your eyes, nose or mouth.  Get a flu shot every year.     Dust Mites  These are tiny bugs that live in cloth or carpet. They are too small to see. Wash sheets and blankets in hot water every week.   Encase pillows and mattress in dust mite proof covers.  Avoid having carpet if you can. If you have carpet, vacuum weekly.   Use a dust mask and HEPA vacuum.   Pollen and Outdoor Mold  Some people are allergic to trees, grass, or weed pollen, or molds. Try to keep your windows closed.  Limit time out doors when pollen count is high.   Ask you health care provider about taking medicine during allergy season.     Animal  Dander  Some people are allergic to skin flakes, urine or saliva from pets with fur or feathers. Keep pets with fur or feathers out of your home.    If you can t keep the pet outdoors, then keep the pet out of your bedroom.  Keep the bedroom door closed.  Keep pets off cloth furniture and away from stuffed toys.     Mice, Rats, and Cockroaches  Some people are allergic to the waste from these pests.   Cover food and garbage.  Clean up spills and food crumbs.  Store grease in the refrigerator.   Keep food out of the bedroom.   Indoor Mold  This can be a trigger if your home has high moisture. Fix leaking faucets, pipes, or other sources of water.   Clean moldy surfaces.  Dehumidify basement if it is damp and smelly.   Smoke, Strong Odors, and Sprays  These can reduce air quality. Stay away from strong odors and sprays, such as perfume, powder, hair spray, paints, smoke incense, paint, cleaning products, candles and new carpet.   Exercise or Sports  Some people with asthma have this trigger. Be active!  Ask your doctor about taking medicine before sports or exercise to prevent symptoms.    Warm up for 5-10 minutes before and after sports or exercise.     Other Triggers of Asthma  Cold air:  Cover your nose and mouth with a scarf.  Sometimes laughing or crying can be a trigger.  Some medicines and food can trigger asthma.

## 2023-04-10 NOTE — LETTER
April 17, 2023      Oumou Carolina  [address] [address]              Dear Oumou,    We care about your health and have reviewed your health plan including your medical conditions, medications, and lab results.  Based on this review, it is recommended that you follow up regarding the following health topic(s):  -Asthma  -Depression  -Colon Cancer Screening  -Wellness (Physical) Visit      We recommend you take the following action(s):  -schedule a WELLNESS (Physical) APPOINTMENT.  We will perform the following labs: Lipids (fasting cholesterol - nothing to eat except water and/or meds for 8-10 hours).  -schedule a COLONOSCOPY to look for colon cancer (due every 10 years or 5 years in higher risk situations.)  Colonoscopies can prevent 90-95% of colon cancer deaths.  Problem lesions can be removed before they ever become cancer.  If you do not wish to do a colonoscopy or cannot afford to do one at this time, there is another option called a Fecal Immunochemical Occult Blood Test (FIT) a take home stool sample kit.  It does not replace the colonoscopy for colorectal cancer screening, but it can detect hidden bleeding in the lower colon.  It does need to be repeated every year and if a positive result is obtained, you would be referred for a colonoscopy.  If you have completed either one of these tests at another facility, please have the records sent to our clinic for our records.   -Complete and return the attached ASTHMA CONTROL TEST.  If your total score is 19 or less or you have been to the ER or urgent care for your asthma, then please schedule an asthma followup appointment.  -Complete and return the attached PHQ-9 Form.  If your total score is greater than 9, please schedule a followup appointment.  If you answer Yes to question 9, call your clinic between the hours of 8 to 5.  You may also call the Suicide Hotline at 2-092-581-ZVMG (4693) any time.     Please call us at the [facility]  Ronald Kebede [phone] (or use [employer]) to address the above recommendations.      Thank you for trusting [facility] and we appreciate the opportunity to serve you.  We look forward to supporting your healthcare needs in the future.     Healthy Regards,     Your Health Care Team at [facility]

## 2023-04-10 NOTE — TELEPHONE ENCOUNTER
Patient Quality Outreach    Patient is due for the following:   Asthma  -  ACT needed and AAP  Colon Cancer Screening  Depression  -  PHQ-9 needed  Physical Preventive Adult Physical,  - Due after 10/10/2023      Topic Date Due     Hepatitis B Vaccine (1 of 3 - 3-dose series) Never done     COVID-19 Vaccine (4 - Booster for Moderna series) 02/20/2022     Zoster (Shingles) Vaccine (2 of 2) 12/05/2022       Next Steps:   Schedule a Adult Preventative  Patient was assigned appropriate questionnaire to complete    Type of outreach:    Sent [employer] message.      Questions for provider review:    Update AAP     Sandra Bundy, [facility]  Chart routed to Care Team and provider.

## 2023-04-12 ENCOUNTER — TRANSFERRED RECORDS (OUTPATIENT)
Dept: HEALTH INFORMATION MANAGEMENT | Facility: CLINIC | Age: 58
End: 2023-04-12
Payer: COMMERCIAL

## 2023-07-31 DIAGNOSIS — F33.1 MODERATE RECURRENT MAJOR DEPRESSION (H): ICD-10-CM

## 2023-07-31 RX ORDER — FLUOXETINE 40 MG/1
CAPSULE ORAL
Qty: 14 CAPSULE | Refills: 0 | Status: SHIPPED | OUTPATIENT
Start: 2023-07-31 | End: 2023-09-05

## 2023-09-02 ENCOUNTER — MYC MEDICAL ADVICE (OUTPATIENT)
Dept: FAMILY MEDICINE | Facility: CLINIC | Age: 58
End: 2023-09-02
Payer: COMMERCIAL

## 2023-09-02 ENCOUNTER — MYC REFILL (OUTPATIENT)
Dept: FAMILY MEDICINE | Facility: CLINIC | Age: 58
End: 2023-09-02
Payer: COMMERCIAL

## 2023-09-02 DIAGNOSIS — F33.1 MODERATE RECURRENT MAJOR DEPRESSION (H): ICD-10-CM

## 2023-09-05 ENCOUNTER — TELEPHONE (OUTPATIENT)
Dept: FAMILY MEDICINE | Facility: CLINIC | Age: 58
End: 2023-09-05
Payer: COMMERCIAL

## 2023-09-05 RX ORDER — FLUOXETINE 40 MG/1
CAPSULE ORAL
Qty: 60 CAPSULE | Refills: 0 | Status: SHIPPED | OUTPATIENT
Start: 2023-09-05 | End: 2023-10-30

## 2023-09-05 RX ORDER — FLUOXETINE 40 MG/1
40 CAPSULE ORAL DAILY
Qty: 14 CAPSULE | Refills: 0 | OUTPATIENT
Start: 2023-09-05

## 2023-09-05 NOTE — TELEPHONE ENCOUNTER
12/1/2022    11:54 AM 1/12/2023     2:55 PM 9/5/2023    11:00 AM   PHQ   PHQ-9 Total Score 9 5 4   Q9: Thoughts of better off dead/self-harm past 2 weeks Not at all Not at all Not at all

## 2023-09-05 NOTE — TELEPHONE ENCOUNTER
Refill sent today to [facility] in [place]. #60 with 0 refills.     ENDER BeachN, RN  [facility] ~ Registered Nurse  Clinic Triage ~ [place] [facility] [place]  September 5, 2023

## 2023-09-11 ENCOUNTER — PATIENT OUTREACH (OUTPATIENT)
Dept: CARE COORDINATION | Facility: CLINIC | Age: 58
End: 2023-09-11
Payer: COMMERCIAL

## 2023-09-25 ENCOUNTER — PATIENT OUTREACH (OUTPATIENT)
Dept: CARE COORDINATION | Facility: CLINIC | Age: 58
End: 2023-09-25
Payer: COMMERCIAL

## 2023-10-26 ASSESSMENT — ANXIETY QUESTIONNAIRES
8. IF YOU CHECKED OFF ANY PROBLEMS, HOW DIFFICULT HAVE THESE MADE IT FOR YOU TO DO YOUR WORK, TAKE CARE OF THINGS AT HOME, OR GET ALONG WITH OTHER PEOPLE?: SOMEWHAT DIFFICULT
7. FEELING AFRAID AS IF SOMETHING AWFUL MIGHT HAPPEN: NOT AT ALL
7. FEELING AFRAID AS IF SOMETHING AWFUL MIGHT HAPPEN: NOT AT ALL
5. BEING SO RESTLESS THAT IT IS HARD TO SIT STILL: SEVERAL DAYS
6. BECOMING EASILY ANNOYED OR IRRITABLE: SEVERAL DAYS
GAD7 TOTAL SCORE: 6
IF YOU CHECKED OFF ANY PROBLEMS ON THIS QUESTIONNAIRE, HOW DIFFICULT HAVE THESE PROBLEMS MADE IT FOR YOU TO DO YOUR WORK, TAKE CARE OF THINGS AT HOME, OR GET ALONG WITH OTHER PEOPLE: SOMEWHAT DIFFICULT
3. WORRYING TOO MUCH ABOUT DIFFERENT THINGS: SEVERAL DAYS
4. TROUBLE RELAXING: SEVERAL DAYS
GAD7 TOTAL SCORE: 6
2. NOT BEING ABLE TO STOP OR CONTROL WORRYING: SEVERAL DAYS
1. FEELING NERVOUS, ANXIOUS, OR ON EDGE: SEVERAL DAYS

## 2023-10-26 ASSESSMENT — ASTHMA QUESTIONNAIRES
QUESTION_2 LAST FOUR WEEKS HOW OFTEN HAVE YOU HAD SHORTNESS OF BREATH: NOT AT ALL
QUESTION_5 LAST FOUR WEEKS HOW WOULD YOU RATE YOUR ASTHMA CONTROL: COMPLETELY CONTROLLED
QUESTION_1 LAST FOUR WEEKS HOW MUCH OF THE TIME DID YOUR ASTHMA KEEP YOU FROM GETTING AS MUCH DONE AT WORK, SCHOOL OR AT HOME: NONE OF THE TIME
QUESTION_3 LAST FOUR WEEKS HOW OFTEN DID YOUR ASTHMA SYMPTOMS (WHEEZING, COUGHING, SHORTNESS OF BREATH, CHEST TIGHTNESS OR PAIN) WAKE YOU UP AT NIGHT OR EARLIER THAN USUAL IN THE MORNING: NOT AT ALL
ACT_TOTALSCORE: 25
QUESTION_4 LAST FOUR WEEKS HOW OFTEN HAVE YOU USED YOUR RESCUE INHALER OR NEBULIZER MEDICATION (SUCH AS ALBUTEROL): NOT AT ALL
ACT_TOTALSCORE: 25

## 2023-10-30 ENCOUNTER — OFFICE VISIT (OUTPATIENT)
Dept: FAMILY MEDICINE | Facility: CLINIC | Age: 58
End: 2023-10-30
Payer: COMMERCIAL

## 2023-10-30 VITALS
BODY MASS INDEX: 34.72 KG/M2 | DIASTOLIC BLOOD PRESSURE: 80 MMHG | HEIGHT: 66 IN | SYSTOLIC BLOOD PRESSURE: 122 MMHG | OXYGEN SATURATION: 98 % | TEMPERATURE: 97.6 F | HEART RATE: 70 BPM | WEIGHT: 216 LBS

## 2023-10-30 DIAGNOSIS — Z12.31 ENCOUNTER FOR SCREENING MAMMOGRAM FOR BREAST CANCER: ICD-10-CM

## 2023-10-30 DIAGNOSIS — F33.1 MODERATE RECURRENT MAJOR DEPRESSION (H): ICD-10-CM

## 2023-10-30 DIAGNOSIS — Z12.11 SCREEN FOR COLON CANCER: ICD-10-CM

## 2023-10-30 DIAGNOSIS — Z13.820 SCREENING FOR OSTEOPOROSIS: ICD-10-CM

## 2023-10-30 DIAGNOSIS — E78.5 HYPERLIPIDEMIA LDL GOAL <160: ICD-10-CM

## 2023-10-30 DIAGNOSIS — Z00.00 ROUTINE MEDICAL EXAM: Primary | ICD-10-CM

## 2023-10-30 DIAGNOSIS — E66.811 CLASS 1 OBESITY DUE TO EXCESS CALORIES WITH SERIOUS COMORBIDITY IN ADULT, UNSPECIFIED BMI: ICD-10-CM

## 2023-10-30 DIAGNOSIS — E66.09 CLASS 1 OBESITY DUE TO EXCESS CALORIES WITH SERIOUS COMORBIDITY IN ADULT, UNSPECIFIED BMI: ICD-10-CM

## 2023-10-30 LAB
ALBUMIN SERPL BCG-MCNC: 4.2 G/DL (ref 3.5–5.2)
ALP SERPL-CCNC: 53 U/L (ref 35–104)
ALT SERPL W P-5'-P-CCNC: 15 U/L (ref 0–50)
ANION GAP SERPL CALCULATED.3IONS-SCNC: 11 MMOL/L (ref 7–15)
AST SERPL W P-5'-P-CCNC: 23 U/L (ref 0–45)
BASOPHILS # BLD AUTO: 0 10E3/UL (ref 0–0.2)
BASOPHILS NFR BLD AUTO: 1 %
BILIRUB SERPL-MCNC: 0.3 MG/DL
BUN SERPL-MCNC: 20 MG/DL (ref 6–20)
CALCIUM SERPL-MCNC: 9.9 MG/DL (ref 8.6–10)
CHLORIDE SERPL-SCNC: 103 MMOL/L (ref 98–107)
CHOLEST SERPL-MCNC: 234 MG/DL
CREAT SERPL-MCNC: 0.71 MG/DL (ref 0.51–0.95)
DEPRECATED HCO3 PLAS-SCNC: 26 MMOL/L (ref 22–29)
EGFRCR SERPLBLD CKD-EPI 2021: >90 ML/MIN/1.73M2
EOSINOPHIL # BLD AUTO: 0.1 10E3/UL (ref 0–0.7)
EOSINOPHIL NFR BLD AUTO: 2 %
ERYTHROCYTE [DISTWIDTH] IN BLOOD BY AUTOMATED COUNT: 12.7 % (ref 10–15)
GLUCOSE SERPL-MCNC: 76 MG/DL (ref 70–99)
HBA1C MFR BLD: 5.2 % (ref 0–5.6)
HCT VFR BLD AUTO: 39.3 % (ref 35–47)
HDLC SERPL-MCNC: 86 MG/DL
HGB BLD-MCNC: 12.8 G/DL (ref 11.7–15.7)
IMM GRANULOCYTES # BLD: 0 10E3/UL
IMM GRANULOCYTES NFR BLD: 0 %
LDLC SERPL CALC-MCNC: 134 MG/DL
LYMPHOCYTES # BLD AUTO: 2.2 10E3/UL (ref 0.8–5.3)
LYMPHOCYTES NFR BLD AUTO: 38 %
MCH RBC QN AUTO: 28.3 PG (ref 26.5–33)
MCHC RBC AUTO-ENTMCNC: 32.6 G/DL (ref 31.5–36.5)
MCV RBC AUTO: 87 FL (ref 78–100)
MONOCYTES # BLD AUTO: 0.4 10E3/UL (ref 0–1.3)
MONOCYTES NFR BLD AUTO: 6 %
NEUTROPHILS # BLD AUTO: 3.2 10E3/UL (ref 1.6–8.3)
NEUTROPHILS NFR BLD AUTO: 54 %
NONHDLC SERPL-MCNC: 148 MG/DL
PLATELET # BLD AUTO: 212 10E3/UL (ref 150–450)
POTASSIUM SERPL-SCNC: 4.4 MMOL/L (ref 3.4–5.3)
PROT SERPL-MCNC: 7.5 G/DL (ref 6.4–8.3)
RBC # BLD AUTO: 4.53 10E6/UL (ref 3.8–5.2)
SODIUM SERPL-SCNC: 140 MMOL/L (ref 135–145)
TRIGL SERPL-MCNC: 68 MG/DL
TSH SERPL DL<=0.005 MIU/L-ACNC: 1.26 UIU/ML (ref 0.3–4.2)
WBC # BLD AUTO: 5.9 10E3/UL (ref 4–11)

## 2023-10-30 PROCEDURE — 83036 HEMOGLOBIN GLYCOSYLATED A1C: CPT | Performed by: NURSE PRACTITIONER

## 2023-10-30 PROCEDURE — 80061 LIPID PANEL: CPT | Performed by: NURSE PRACTITIONER

## 2023-10-30 PROCEDURE — 36415 COLL VENOUS BLD VENIPUNCTURE: CPT | Performed by: NURSE PRACTITIONER

## 2023-10-30 PROCEDURE — 85025 COMPLETE CBC W/AUTO DIFF WBC: CPT | Performed by: NURSE PRACTITIONER

## 2023-10-30 PROCEDURE — 99396 PREV VISIT EST AGE 40-64: CPT | Mod: 25 | Performed by: NURSE PRACTITIONER

## 2023-10-30 PROCEDURE — 84443 ASSAY THYROID STIM HORMONE: CPT | Performed by: NURSE PRACTITIONER

## 2023-10-30 PROCEDURE — 99214 OFFICE O/P EST MOD 30 MIN: CPT | Mod: 25 | Performed by: NURSE PRACTITIONER

## 2023-10-30 PROCEDURE — 80053 COMPREHEN METABOLIC PANEL: CPT | Performed by: NURSE PRACTITIONER

## 2023-10-30 PROCEDURE — 90471 IMMUNIZATION ADMIN: CPT | Performed by: NURSE PRACTITIONER

## 2023-10-30 PROCEDURE — 90750 HZV VACC RECOMBINANT IM: CPT | Performed by: NURSE PRACTITIONER

## 2023-10-30 RX ORDER — FLUOXETINE 40 MG/1
40 CAPSULE ORAL DAILY
Qty: 90 CAPSULE | Refills: 1 | Status: SHIPPED | OUTPATIENT
Start: 2023-10-30 | End: 2024-05-07

## 2023-10-30 NOTE — PROGRESS NOTES
"  {PROVIDER CHARTING PREFERENCE:603539}    Subjective   Oumou is a 58 year old, presenting for the following health issues:  Recheck Medication      10/30/2023     8:09 AM   Additional Questions   Roomed by Adry HOUSTON CMA   Accompanied by self         10/30/2023     8:09 AM   Patient Reported Additional Medications   Patient reports taking the following new medications n/a       History of Present Illness       Mental Health Follow-up:  Patient presents to follow-up on Depression & Anxiety.Patient's depression since last visit has been:  Better  The patient is not having other symptoms associated with depression.  Patient's anxiety since last visit has been:  Better  The patient is not having other symptoms associated with anxiety.  Any significant life events: grief or loss  Patient is not feeling anxious or having panic attacks.  Patient has no concerns about alcohol or drug use.    She eats 2-3 servings of fruits and vegetables daily.She consumes 0 sweetened beverage(s) daily.She exercises with enough effort to increase her heart rate 60 or more minutes per day.  She exercises with enough effort to increase her heart rate 5 days per week.   She is taking medications regularly.       {MA/LPN/RN Pre-Provider Visit Orders- hCG/UA/Strep (Optional):610398}  {SUPERLIST (Optional):184763}  {additonal problems for provider to add (Optional):955070}      Review of Systems   {ROS COMP (Optional):927314}      Objective    /80   Pulse 70   Temp 97.6  F (36.4  C) (Temporal)   Ht 1.676 m (5' 6\")   Wt 98 kg (216 lb)   LMP  (LMP Unknown)   SpO2 98%   BMI 34.86 kg/m    Body mass index is 34.86 kg/m .  Physical Exam   {Exam List (Optional):222167}    {Diagnostic Test Results (Optional):941293}    {AMBULATORY ATTESTATION (Optional):638907}              "

## 2023-10-30 NOTE — PROGRESS NOTES
SUBJECTIVE:   CC: Oumou is an 58 year old who presents for preventive health visit.       10/30/2023     8:09 AM   Additional Questions   Roomed by Adry HOUSTON CMA   Accompanied by self         10/30/2023     8:09 AM   Patient Reported Additional Medications   Patient reports taking the following new medications n/a       History of Present Illness       Mental Health Follow-up:  Patient presents to follow-up on Depression & Anxiety.Patient's depression since last visit has been:  Better  The patient is not having other symptoms associated with depression.  Patient's anxiety since last visit has been:  Better  The patient is not having other symptoms associated with anxiety.  Any significant life events: grief or loss  Patient is not feeling anxious or having panic attacks.  Patient has no concerns about alcohol or drug use.    She eats 2-3 servings of fruits and vegetables daily.She consumes 0 sweetened beverage(s) daily.She exercises with enough effort to increase her heart rate 60 or more minutes per day.  She exercises with enough effort to increase her heart rate 5 days per week.   She is taking medications regularly.  :  Oumou is a 58 year old female with a history significant for BCC, atrial tachycardia (s/p ablation), and anxiety. She presents today for her annual wellness exam, and medication check. She reports a small reddened area on her neck right between her clavicles stating she had noticed a lump there this summer that she eventually manipulated causing it to pop and excrete purulent bloody drainage. No pain or fevers noted with this. Has been taking Prozac for her anxiety, reporting that this medication has been effective. In need of additional refills.     Mental Health Follow-up:  Patient presents to follow-up on Depression & Anxiety.Patient's depression since last visit has been:  Better  The patient is not having other symptoms associated with depression.  Patient's anxiety since last  visit has been:  Better  The patient is not having other symptoms associated with anxiety.  Any significant life events: grief or loss  Patient is not feeling anxious or having panic attacks.  Patient has no concerns about alcohol or drug use.  Patient is in monthly counseling.    Social History     Tobacco Use     Smoking status: Never     Smokeless tobacco: Never   Substance Use Topics     Alcohol use: No     Comment: none at all              10/10/2022     8:11 AM   Alcohol Use   Prescreen: >3 drinks/day or >7 drinks/week? Not Applicable     Reviewed orders with patient.  Reviewed health maintenance and updated orders accordingly - Yes  Lab work is in process    Breast Cancer Screening:        10/10/2022     8:12 AM   Breast CA Risk Assessment (FHS-7)   Do you have a family history of breast, colon, or ovarian cancer? No / Unknown       Mammogram Screening: Recommended annual mammography  Pertinent mammograms are reviewed under the imaging tab.    Pap not indicated, total vaginal hysterectomy completed for excessive menstruation.    Reviewed and updated as needed this visit by clinical staff   Tobacco  Allergies  Meds          MEDICATIONS:   Fluoxetine 40mg Oral Daily  Albuterol inhaler 1-2 puffs inhalation every 4 hours as needed    ALLERGIES:  NKDA    IMMUNIZATIONS:  Up to date.  Received shingles vaccine this visit.   Influenza and COVID vaccines received outside of the clinic.    Reviewed and updated as needed this visit by Provider     Past Medical History:   Diagnosis Date     Allergy, unspecified not elsewhere classified 2003    s/p desensitization     Anxiety      Atrial tachycardia     ablation 3/24/11     Bartholin's abscess 7/09     Basal cell carcinoma of anterior chest s/p excision 11-4-14 11/4/2014     Bradycardia      Excessive or frequent menstruation 2001    s/p TVH     Transient disorder of initiating or maintaining sleep 2005      SOCIAL:   Patient is .  Lives at home alone with her  "puppy. Working with puppy on obedience training, and recently obtained therapy dog status.  Daughter and two grand children live close by, active involvement.  Parents live in [place]. They visit occasionally.  Pt denies alcohol, tobacco, and substance use.  Exercises 3 days/week doing Cross Fit in [employer]. She walks several days a week as well.  She reports that she sleeps 6-8 hours a night, and feels rested most days.  Nutritionally she states she could make better choices, but tries to get in the recommended amount of fruits and vegetables.   She does report some stress associated with her job, and feels that this is where most of her anxiety stems from.     Review of Systems  CONSTITUTIONAL: NEGATIVE for fever, chills, change in weight  INTEGUMENTARY/SKIN: POSITIVE for small reddened area on neck. NEGATIVE for any ulcers, lesions, or open sores.  EYES: NEGATIVE for vision changes or irritation  ENT: NEGATIVE for ear, mouth and throat problems  RESP: NEGATIVE for significant cough or SOB  BREAST: NEGATIVE for masses, tenderness or discharge  CV: NEGATIVE for chest pain, palpitations or peripheral edema  GI: NEGATIVE for nausea, abdominal pain, heartburn, or change in bowel habits  : NEGATIVE for unusual urinary or vaginal symptoms. No vaginal bleeding.  MUSCULOSKELETAL: NEGATIVE for significant arthralgias or myalgia  NEURO: NEGATIVE for weakness, dizziness or paresthesias  PSYCHIATRIC: NEGATIVE for changes in mood or affect      OBJECTIVE:   /80   Pulse 70   Temp 97.6  F (36.4  C) (Temporal)   Ht 1.676 m (5' 6\")   Wt 98 kg (216 lb)   LMP  (LMP Unknown)   SpO2 98%   BMI 34.86 kg/m    Physical Exam  GENERAL APPEARANCE: Well appearing female in no distress.  EYES: Eyes grossly normal to inspection, PERRL and conjunctivae and sclerae normal  HENT: Bilateral ear canals clear, and TM's pearly grey. Bilateral nares patent. Oral mucous membranes moist, without sores or lesions. No tonsil enlargement.   NECK: " Trachea midline. No adenopathy.  RESP: Lungs clear to auscultation bilaterally - no rales, rhonchi or wheezes  BREAST: Normal without masses, tenderness or nipple discharge and no palpable axillary masses or adenopathy.  CV: Regular rate and rhythm, normal S1 S2, no S3 or S4, no murmur, click or rub.   ABDOMEN: Soft, nontender, no hepatosplenomegaly, no masses and bowel sounds present in all 4 quadrants.  MS: Some swelling to left ankle noted.   SKIN: Warm, dry, and intact. Small pimple appearing spot noted on neck between clavicles.  NEURO: Normal strength and tone, sensory exam grossly normal, mentation intact and speech normal  PSYCH: Appropriate affect, maintained eye contact throughout visit. Mentation normal.     Diagnostic Test Results:  Labs reviewed in Epic  Results for orders placed or performed in visit on 10/30/23   Lipid panel reflex to direct LDL Non-fasting     Status: Abnormal   Result Value Ref Range    Cholesterol 234 (H) <200 mg/dL    Triglycerides 68 <150 mg/dL    Direct Measure HDL 86 >=50 mg/dL    LDL Cholesterol Calculated 134 (H) <=100 mg/dL    Non HDL Cholesterol 148 (H) <130 mg/dL    Narrative    Cholesterol  Desirable:  <200 mg/dL    Triglycerides  Normal:  Less than 150 mg/dL  Borderline High:  150-199 mg/dL  High:  200-499 mg/dL  Very High:  Greater than or equal to 500 mg/dL    Direct Measure HDL  Female:  Greater than or equal to 50 mg/dL   Male:  Greater than or equal to 40 mg/dL    LDL Cholesterol  Desirable:  <100mg/dL  Above Desirable:  100-129 mg/dL   Borderline High:  130-159 mg/dL   High:  160-189 mg/dL   Very High:  >= 190 mg/dL    Non HDL Cholesterol  Desirable:  130 mg/dL  Above Desirable:  130-159 mg/dL  Borderline High:  160-189 mg/dL  High:  190-219 mg/dL  Very High:  Greater than or equal to 220 mg/dL   Hemoglobin A1c     Status: Normal   Result Value Ref Range    Hemoglobin A1C 5.2 0.0 - 5.6 %   TSH with free T4 reflex     Status: Normal   Result Value Ref Range    TSH  1.26 0.30 - 4.20 uIU/mL   Comprehensive metabolic panel     Status: Normal   Result Value Ref Range    Sodium 140 135 - 145 mmol/L    Potassium 4.4 3.4 - 5.3 mmol/L    Carbon Dioxide (CO2) 26 22 - 29 mmol/L    Anion Gap 11 7 - 15 mmol/L    Urea Nitrogen 20.0 6.0 - 20.0 mg/dL    Creatinine 0.71 0.51 - 0.95 mg/dL    GFR Estimate >90 >60 mL/min/1.73m2    Calcium 9.9 8.6 - 10.0 mg/dL    Chloride 103 98 - 107 mmol/L    Glucose 76 70 - 99 mg/dL    Alkaline Phosphatase 53 35 - 104 U/L    AST 23 0 - 45 U/L    ALT 15 0 - 50 U/L    Protein Total 7.5 6.4 - 8.3 g/dL    Albumin 4.2 3.5 - 5.2 g/dL    Bilirubin Total 0.3 <=1.2 mg/dL   CBC with platelets and differential     Status: None   Result Value Ref Range    WBC Count 5.9 4.0 - 11.0 10e3/uL    RBC Count 4.53 3.80 - 5.20 10e6/uL    Hemoglobin 12.8 11.7 - 15.7 g/dL    Hematocrit 39.3 35.0 - 47.0 %    MCV 87 78 - 100 fL    MCH 28.3 26.5 - 33.0 pg    MCHC 32.6 31.5 - 36.5 g/dL    RDW 12.7 10.0 - 15.0 %    Platelet Count 212 150 - 450 10e3/uL    % Neutrophils 54 %    % Lymphocytes 38 %    % Monocytes 6 %    % Eosinophils 2 %    % Basophils 1 %    % Immature Granulocytes 0 %    Absolute Neutrophils 3.2 1.6 - 8.3 10e3/uL    Absolute Lymphocytes 2.2 0.8 - 5.3 10e3/uL    Absolute Monocytes 0.4 0.0 - 1.3 10e3/uL    Absolute Eosinophils 0.1 0.0 - 0.7 10e3/uL    Absolute Basophils 0.0 0.0 - 0.2 10e3/uL    Absolute Immature Granulocytes 0.0 <=0.4 10e3/uL   CBC with Platelets & Differential     Status: None    Narrative    The following orders were created for panel order CBC with Platelets & Differential.  Procedure                               Abnormality         Status                     ---------                               -----------         ------                     CBC with platelets and d...[[id number]]                      Final result                 Please view results for these tests on the individual orders.       ASSESSMENT/PLAN:   (Z00.00) Routine medical exam   "(primary encounter diagnosis)  Plan: CBC with Platelets & Differential, Hemoglobin         A1c, TSH with free T4 reflex, Comprehensive         metabolic panel        Discussed nutrition and exercise for weight management. Offered nutrition consult.     (F33.1) Moderate recurrent major depression (H)  Comment: Controlled  Plan: FLUoxetine (PROZAC) 40 MG capsule        Patient reports feel more like herself and feeling that she is \"out of the muck\".        She has established an exercise routine, and also uses this as a social outlet.         She feels that symptoms are controlled on the current dose of fluoxetine.        She is also utilizing therapy once a month.        Continue current medication and therapy regimen.  Recheck virtual visit in 6 months.    (Z12.11) Screen for colon cancer  Plan: Colonoscopy Screening  Referral         Reports no family history of colon cancer.         Routine screening.     (E78.5) Hyperlipidemia LDL goal <160  Plan: Lipid panel reflex to direct LDL Non-fasting         Discussed diet and nutrition in managing lipid levels.  At goal without statin.  High HDL    (Z12.31) Encounter for screening mammogram for breast cancer  Plan: *MA Screening Digital Bilateral         Annual mammogram screening.    (Z13.820) Screening for osteoporosis  Plan: DX Hip/Pelvis/Spine         Baseline testing.              Encouraged dietary intake of calcium and vitamin D in addition to continued weight bearing/resistance training exercises to help maintain bone mass.  Patient checking cost.    Class I obesity due to excess calories with serious comorbidity in adult, unspecified BMI.  Nutrition and exercise counseling performed.  Nutrition referral offered.  Patient declined.  Continue to work on healthy habits and increasing gym time.    Patient has been advised of split billing requirements and indicates understanding: Yes      COUNSELING:  Reviewed preventive health counseling, as reflected in " "patient instructions       Regular exercise       Healthy diet/nutrition       Immunizations  Vaccinated for: Zoster    Received Influenza and Covid vaccines in the last couple of weeks [facility].         Osteoporosis prevention/bone health       Colorectal Cancer Screening-ordered      BMI:   Estimated body mass index is 34.86 kg/m  as calculated from the following:    Height as of this encounter: 1.676 m (5' 6\").    Weight as of this encounter: 98 kg (216 lb).   Weight management plan: Discussed healthy diet and exercise guidelines Offered nutrition consult.       She reports that she has never smoked. She has never used smokeless tobacco.        TORIBIO Steen [facility] DONELL    Patient was seen, examined and note reviewed by Florinda Eaton DNP.   Chi Gipson RN, DNP student  [employer]   "

## 2023-11-17 ENCOUNTER — HOSPITAL ENCOUNTER (OUTPATIENT)
Dept: MAMMOGRAPHY | Facility: CLINIC | Age: 58
Discharge: HOME | End: 2023-11-17
Attending: NURSE PRACTITIONER
Payer: COMMERCIAL

## 2023-11-17 ENCOUNTER — HOSPITAL ENCOUNTER (OUTPATIENT)
Dept: BONE DENSITY | Facility: CLINIC | Age: 58
Discharge: HOME | End: 2023-11-17
Attending: NURSE PRACTITIONER
Payer: COMMERCIAL

## 2023-11-17 DIAGNOSIS — Z13.820 SCREENING FOR OSTEOPOROSIS: ICD-10-CM

## 2023-11-17 DIAGNOSIS — Z12.31 ENCOUNTER FOR SCREENING MAMMOGRAM FOR BREAST CANCER: ICD-10-CM

## 2023-11-17 PROCEDURE — 77080 DXA BONE DENSITY AXIAL: CPT

## 2023-11-17 PROCEDURE — 77067 SCR MAMMO BI INCL CAD: CPT

## 2024-04-10 ENCOUNTER — TRANSFERRED RECORDS (OUTPATIENT)
Dept: HEALTH INFORMATION MANAGEMENT | Facility: CLINIC | Age: 59
End: 2024-04-10
Payer: COMMERCIAL

## 2024-04-22 ENCOUNTER — TRANSFERRED RECORDS (OUTPATIENT)
Dept: HEALTH INFORMATION MANAGEMENT | Facility: CLINIC | Age: 59
End: 2024-04-22
Payer: COMMERCIAL

## 2024-04-22 ENCOUNTER — NURSE TRIAGE (OUTPATIENT)
Dept: FAMILY MEDICINE | Facility: CLINIC | Age: 59
End: 2024-04-22
Payer: COMMERCIAL

## 2024-04-22 ENCOUNTER — MYC MEDICAL ADVICE (OUTPATIENT)
Dept: FAMILY MEDICINE | Facility: CLINIC | Age: 59
End: 2024-04-22
Payer: COMMERCIAL

## 2024-04-22 NOTE — TELEPHONE ENCOUNTER
Patient fell on her left knee.  She states the knee swelling.  She states it was difficult to walk after that.    She now has some bruising on the top of her knee and down her leg.    She has what looks like fluid on the top of her knee.  She has swelling during the day and the swelling goes down after elevating.    She has almost no pain with rest, and up to a 3 with walking. Having her knee touched it is worse.    Per protocol patient advised to go to the urgent care. Patient agrees with the plan.  Camelia Galloway RN on 4/22/2024 at 3:38 PM      Reason for Disposition   Large swelling or bruise and size > palm of person's hand    Additional Information   Negative: Major bleeding (actively dripping or spurting) that can't be stopped   Negative: Bullet, stabbed by knife or other serious penetrating wound   Negative: Looks like a dislocated joint (crooked or deformed)   Negative: Serious injury with multiple fractures (broken bones)   Negative: Sounds like a life-threatening emergency to the triager   Negative: Wound looks infected   Negative: Knee pain from overuse (e.g., sports, running, physical work)   Negative: Knee pain not from an injury   Negative: Can't stand (bear weight) or walk   Negative: Skin is split open or gaping (length > 1/2 inch or 12 mm)   Negative: Bleeding won't stop after 10 minutes of direct pressure (using correct technique)   Negative: Dirt in the wound and not removed after 15 minutes of scrubbing   Negative: Sounds like a serious injury to the triager   Negative: Looks infected (e.g., spreading redness, red streak, pus)   Negative: SEVERE pain (e.g., excruciating)   Negative: No prior tetanus shots (or is not fully vaccinated) and any wound (e.g., cut or scrape)   Negative: HIV positive or severe immunodeficiency (severely weak immune system) and DIRTY cut or scrape   Negative: Patient wants to be seen   Negative: Injury interferes with work or school   Negative: A 'snap' or 'pop' was  heard at the time of injury    Protocols used: Knee Injury-A-[place]

## 2024-04-26 ENCOUNTER — E-VISIT (OUTPATIENT)
Dept: FAMILY MEDICINE | Facility: CLINIC | Age: 59
End: 2024-04-26
Payer: COMMERCIAL

## 2024-04-26 DIAGNOSIS — H10.30 ACUTE BACTERIAL CONJUNCTIVITIS, UNSPECIFIED LATERALITY: Primary | ICD-10-CM

## 2024-04-26 PROCEDURE — 99421 OL DIG E/M SVC 5-10 MIN: CPT | Performed by: NURSE PRACTITIONER

## 2024-04-26 RX ORDER — POLYMYXIN B SULFATE AND TRIMETHOPRIM 1; 10000 MG/ML; [USP'U]/ML
SOLUTION OPHTHALMIC
Qty: 10 ML | Refills: 0 | Status: SHIPPED | OUTPATIENT
Start: 2024-04-26 | End: 2024-05-03

## 2024-04-26 NOTE — PATIENT INSTRUCTIONS
Thank you for choosing us for your care. I have placed an order for a prescription so that you can start treatment. View your full visit summary for details by clicking on the link below. Your pharmacist will able to address any questions you may have about the medication.     If you re not feeling better within 2-3 days, please schedule an appointment.  You can schedule an appointment right here in [facility], or call [phone]  If the visit is for the same symptoms as your eVisit, we ll refund the cost of your eVisit if seen within seven days.      Also, advise follow-up with virtual visit to re-check you mood.   Sincerely,   Florinda Eaton, DNP   Pinkeye: Care Instructions  Overview     Pinkeye is redness and swelling of the eye surface and the conjunctiva (the lining of the eyelid and the covering of the white part of the eye). Pinkeye is also called conjunctivitis. Pinkeye is often caused by infection with bacteria or a virus. Dry air, allergies, smoke, and chemicals are other common causes.  Pinkeye often gets better on its own in 7 to 10 days. Antibiotics only help if the pinkeye is caused by bacteria. Pinkeye caused by infection spreads easily. If an allergy or chemical is causing pinkeye, it will not go away unless you can avoid whatever is causing it.  Follow-up care is a key part of your treatment and safety. Be sure to make and go to all appointments, and call your doctor if you are having problems. It's also a good idea to know your test results and keep a list of the medicines you take.  How can you care for yourself at home?  Wash your hands often. Always wash them before and after you treat pinkeye or touch your eyes or face.  Use moist cotton or a clean, wet cloth to remove crust. Wipe from the inside corner of the eye to the outside. Use a clean part of the cloth for each wipe.  Put cold or warm wet cloths on your eye a few times a day if the eye hurts.  Do not wear contact lenses or eye makeup  "until the pinkeye is gone. Throw away any eye makeup you were using when you got pinkeye. Clean your contacts and storage case. If you wear disposable contacts, use a new pair when your eye has cleared and it is safe to wear contacts again.  If the doctor gave you antibiotic ointment or eyedrops, use them as directed. Use the medicine for as long as instructed, even if your eye starts looking better soon. Keep the bottle tip clean, and do not let it touch the eye area.  To put in eyedrops or ointment:  Tilt your head back, and pull your lower eyelid down with one finger.  Drop or squirt the medicine inside the lower lid.  Close your eye for 30 to 60 seconds to let the drops or ointment move around.  Do not touch the ointment or dropper tip to your eyelashes or any other surface.  Do not share towels, pillows, or washcloths while you have pinkeye.  When should you call for help?   Call your doctor now or seek immediate medical care if:    You have pain in your eye, not just irritation on the surface.     You have a change in vision or loss of vision.     You have an increase in discharge from the eye.     Your eye has not started to improve or begins to get worse within 48 hours after you start using antibiotics.     Pinkeye lasts longer than 7 days.   Watch closely for changes in your health, and be sure to contact your doctor if you have any problems.  Where can you learn more?  Go to https://[email or web address].net/patiented  Enter Y392 in the search box to learn more about \"Pinkeye: Care Instructions.\"  Current as of: June 5, 2023               Content Version: 14.0    2649-5133 [employer].   Care instructions adapted under license by your healthcare professional. If you have questions about a medical condition or this instruction, always ask your healthcare professional. [employer] disclaims any warranty or liability for your use of this information.      "

## 2024-05-01 ENCOUNTER — TRANSFERRED RECORDS (OUTPATIENT)
Dept: HEALTH INFORMATION MANAGEMENT | Facility: CLINIC | Age: 59
End: 2024-05-01
Payer: COMMERCIAL

## 2024-05-01 NOTE — PATIENT INSTRUCTIONS
Wound Care After a Biopsy    What is a skin biopsy?  A skin biopsy allows the doctor to examine a very small piece of tissue under the microscope to determine the diagnosis and the best treatment for the skin condition. A local anesthetic (numbing medicine) is injected with a very small needle into the skin area to be tested. A small piece of skin is taken from the area. Sometimes a suture (stitch) is used.     What are the risks of a skin biopsy?  I will experience scar, bleeding, swelling, pain, crusting and redness. I may experience incomplete removal or recurrence. Risks of this procedure are excessive bleeding, bruising, infection, nerve damage, numbness, thick (hypertrophic or keloidal) scar and non-diagnostic biopsy.    How should I care for my wound for the first 24 hours?  Keep the wound dry and covered for 24 hours  If it bleeds, hold direct pressure on the area for 15 minutes. If bleeding does not stop, call us or go to the emergency room  Avoid strenuous exercise the first 1-2 days or as your doctor instructs you    How should I care for the wound after 24 hours?  After 24 hours, remove the bandage  You may bathe or shower as normal  If you had a scalp biopsy, you can shampoo as usual and can use shower water to clean the biopsy site daily  Clean the wound once a day with gentle soap and water  Do not scrub, be gentle  Apply white petroleum/Vaseline after cleaning the wound with a cotton swab or a clean finger, and keep the site covered with a Bandaid /bandage. Bandages are not necessary with a scalp biopsy  If you are unable to cover the site with a Bandaid /bandage, re-apply ointment 2-3 times a day to keep the site moist. Moisture will help with healing  Avoid strenuous activity for first 1-2 days  Avoid lakes, rivers, pools, and oceans until the stitches are removed or the site is healed    How do I clean my wound?  Wash hands thoroughly with soap or use hand  before all wound  care  Clean the wound with gentle soap and water  Apply white petroleum/Vaseline  to wound after it is clean  Replace the Bandaid /bandage to keep the wound covered for the first few days or as instructed by your doctor  If you had a scalp biopsy, warm shower water to the area on a daily basis should suffice    What should I use to clean my wound?   Cotton-tipped applicators (Qtips )  White petroleum jelly (Vaseline ). Use a clean new container and use Q-tips to apply.  Bandaids  as needed  Gentle soap     How should I care for my wound long term?  Do not get your wound dirty  Keep up with wound care for one week or until the area is healed.  If you have stitches, stitches need to be removed in 11-14 days. You may return to our clinic for this or you may have it done locally at your doctor s office.  A small scab will form and fall off by itself when the area is completely healed. The area will be red and will become pink in color as it heals. Sun protection is very important for how your scar will turn out. Sunscreen with an SPF 30 or greater is recommended once the area is healed.  You should have some soreness but it should be mild and slowly go away over several days. Talk to your doctor about using tylenol for pain,    When should I call my doctor?  If you have increased:   Pain or swelling  Pus or drainage (clear or slightly yellow drainage is ok)  Temperature over 100F  Spreading redness or warmth around wound    When will I hear about my results?  The biopsy results can take 2 weeks to come back.  Your results will automatically release to [employer] before your provider has even reviewed them.  The clinic will call you with the results, send you a [employer] message, or have you schedule a follow-up clinic or phone time to discuss the results.  Contact our clinics if you do not hear from us in 2 weeks.    Who should I call with questions?  [facility]: [phone]  [facility]  [facility]: [phone]  For urgent needs outside of business hours call the [facility] [[email or web address]] at [phone] and ask for the dermatology resident on call       Checking for Skin Cancer  You can help find cancer early by checking your skin each month. There are 3 main kinds of skin cancer: melanoma, basal cell carcinoma, and squamous cell carcinoma. Doing monthly skin checks is the best way to find new marks, sores, or skin changes. Follow these instructions for checking your skin.   The ABCDEs of checking moles for melanoma   Check your moles or growths for signs of melanoma using ABCDE:   Asymmetry: The sides of the mole or growth don t match.  Border: The edges are ragged, notched, or blurred.  Color: The color within the mole or growth varies. It could be black, brown, tan, white, or shades of red, gray, or blue.  Diameter: The mole or growth is larger than   inch or 6 mm (size of a pencil eraser).  Evolving: The size, shape, texture, or color of the mole or growth is changing.     ABCDE's of moles on light skin.        ABCDE's of moles on dark skin may be harder to identify.     Checking for other types of skin cancer  Basal cell carcinoma or squamous cell carcinoma cause symptoms like:     A spot or mole that looks different from all other marks on your skin  Changes in how an area feels, such as itching, tenderness, or pain  Changes in the skin's surface, such as oozing, bleeding, or scaliness  A sore that doesn't heal  New swelling, redness, or spread of color beyond the border of a mole    Who s at risk?  Anyone of any skin color can get skin cancer. But you're at greater risk if you have:   Fair skin that freckles easily and burns instead of tanning  Light-colored or red hair  Light-colored eyes  Many moles or abnormal moles on your skin  A long history of unprotected exposure to sunlight or tanning beds  A history of many blistering sunburns as a child or teen  A family history of skin  cancer  Been exposed to radiation or chemicals  A weakened immune system  Been exposed to arsenic  If you've had skin cancer in the past, you're at high risk of having it again.   How to check your skin  Do your monthly skin checkups in front of a full-length mirror. Use a room with good lighting so it's easier to see. Use a hand mirror to look at hard-to-see places like your buttocks and back. You can also have a trusted friend or family member help you with these checks. Check every part of your body, including your:   Head (ears, face, neck, and scalp)  Torso (front, back, sides, and under breasts)  Arms (tops, undersides, and armpits)  Hands (palms, backs, and fingers, including under the nails)  Lower back, buttocks, and genitals  Legs (front, back, and sides)  Feet (tops, soles, toes, including under the nails, and between toes)  Watch for new spots on your skin or a spot that's changing in color, shape, size.   If you have a lot of moles, take digital photos of them each month. Make sure to take photos both up close and from a distance. These can help you see if any moles change over time.   Know your skin  Most skin changes aren't cancer. But if you see any changes in your skin, call your healthcare provider right away. Only they can tell you if a change is a problem. If you have skin cancer, seeing your provider can be the first step to getting the treatment that could save your life.   [employer] last reviewed this educational content on 10/1/2021    5945-3468 [employer]. All rights reserved. This information is not intended as a substitute for professional medical care. Always follow your healthcare professional's instructions.

## 2024-05-01 NOTE — PROGRESS NOTES
[facility] Dermatology Note  Encounter Date: May 3, 2024  Office Visit     Dermatology Problem List:  Last skin check 05/03/24, due yearly  0. NUB, upper abdomen, s/p bx 05/03/24  0. NUB, central chest, s/pb bx 05/03/24  1. Family history of melanoma (mother)  2. NMSC  - BCC left jaw line, s/p excision 8/27/2018  - BCC, central abdomen, s/p excision 11/25/2015  - BCC, nodular, left frontal scalp, s/p Mohs 2/10/15  - SCCIS, left chest, s/p excision 2/10/15  - BCC, nodular with fibrosis, right chest, s/p excision 11/4/14   3. Actinic Keratosis - s/p cryotherapy, PDT 2019 x 3, Efudex 3/2015  - HAK, left medial cheek, s/p bx 11/30/21, s/p cryo then efudex  - AK, left infraorbital, s/p bx 11/30/21, resolved  - Pigmented AK, central chest, s/p bx 11/30/21, s/p cryo 1/21/22  - Pigmented AK, left chest, s/p bx 8/10/18  4. Seborrheic dermatitis  - Lidex 8/2018  5. Folliculitis, central chest, s/p biopsy 8/10/2018  6. EIC vs dermatofibroma, mid central back  7. Epidermoid cyst, back, s/p excision 2/21/19   8.. Benign biopsies  - Benign follicular tumor, most consistent with pilar sheath acanthoma, s/p bx 12/12/2022  - Scale crust and mild epidermal hyperplasia, L forehead s/p bx 9/23/22, asymptomatic and clinically consistent with biopsy scar 12/12/22  Social History -  passed away 12/2021    ____________________________________________    Assessment & Plan:    # Family History of melanoma in a first degree relative (mother)  - ABCDEs: Counseled ABCDEs of melanoma: Asymmetry, Border (irregularity), Color (not uniform, changes in color), Diameter (greater than 6 mm which is about the size of a pencil eraser), and Evolving (any changes in preexisting moles).  - Sun protection: Counseled SPF30+ sunscreen, UPF clothing, sun avoidance, tanning bed avoidance.   - Recommended regular skin checks     # History of nonmelanoma skin cancer, no clincial evidence of recurrence.   - Plan as above    #  Seborrheic keratosis, symptomatic. L lower lid, R temple   - No treatment necessary unless lesions become symptomatic.      # Neoplasm of unspecified behavior of the skin (D49.2) on the upper abdomen. The differential diagnosis includes cyst. .    - red papule with central pore, present for months   - 4 mm punch biopsy today.  See procedure section.    # Neoplasm of unspecified behavior of the skin (D49.2) on the central chest. The differential diagnosis includes scar or BCC.    - 5 mm Erythematous macule    - Shave biopsy today. See procedure section.       Procedures Performed:   - Punch biopsy procedure note, location(s): central abdomen. After discussion of benefits and risks including but not limited to bleeding, infection, scar, incomplete removal, recurrence, and non-diagnostic biopsy, written consent and photographs were obtained. The area was cleaned with isopropyl alcohol. 0.5mL of 1% lidocaine with epinephrine was injected to obtain adequate anesthesia and a 4 mm punch biopsy was performed at site(s). 3-0 Prolene sutures were utilized to approximate the epidermal edges. White petrolatum ointment and a bandage was applied to the wound. Explicit verbal and written wound care instructions were provided. The patient left the dermatology clinic in good condition.     - Shave biopsy procedure note, location(s): central chest. After discussion of benefits and risks including but not limited to bleeding, infection, scar, incomplete removal, recurrence, and non-diagnostic biopsy, written consent and photographs were obtained. The area was cleaned with isopropyl alcohol. 0.5mL of 1% lidocaine with epinephrine was injected to obtain adequate anesthesia of lesion(s). Shave biopsy at site(s) performed. Hemostasis was achieved with aluminium chloride. Petrolatum ointment and a sterile dressing were applied. The patient tolerated the procedure and no complications were noted. The patient was provided with verbal and  written post care instructions.     Follow-up: 1 ravi dias as needed    Staff and Scribe:     Scribe Disclosure:   I, Erin Varner, am serving as a scribe to document services personally performed by Sonal Peters MD based on data collection and the provider's statements to me.     Provider Disclosure:   The documentation recorded by the scribe accurately reflects the services I personally performed and the decisions made by me.    Sonal Peters MD    Department of Dermatology  [facility] [place]: Phone: [phone], Fax:[phone]  [facility]: Phone: [phone], Fax: [phone]   ____________________________________________    CC: Skin Check (Pt is here for a FBSC. R temple, L lower eye, L flank and middle of abdomen. )    HPI:  Ms. Oumou Carolina is a(n) 59 year old female who presents today as a return patient for skin check.    Today, patient reports spots she would like checked on her left lower eye, R temple, middle abdomen and L flank. Spot on abdomen has been present for months. Sometimes goes away but then returns. Is hard.      Patient is otherwise feeling well, without additional skin concerns.    Labs Reviewed:  Last Derm Path 12/12/22  Final Diagnosis   A(1). Skin, L philtrum, punch:  - Benign follicular tumor, most consistent with pilar sheath acanthoma       Physical Exam:  Vitals: LMP  (LMP Unknown)   SKIN: Total skin including areas was performed. The exam included the head/face, neck, both arms, chest, back, abdomen, both legs, digits and/or nails.     - There is a red papule with central pore on the upper abdomen..   - There are waxy stuck on tan to brown papules on the R temple and L lower eye lid.  - There is a 5 mm erythematous macule on the central chest.     - No other lesions of concern on areas examined.     Medications:  Current Outpatient Medications    Medication Sig Dispense Refill    albuterol (PROAIR HFA) 108 (90 Base) MCG/ACT inhaler Inhale 1-2 puffs into the lungs every 4 hours as needed for shortness of breath / dyspnea or wheezing 18 g 0    FLUoxetine (PROZAC) 40 MG capsule Take 1 capsule (40 mg) by mouth daily 90 capsule 1    polymixin b-trimethoprim (POLYTRIM) 71946-0.1 UNIT/ML-% ophthalmic solution 1 drop in affected eye(s) every 3 hrs while awake for 5-7 days until resolved - max 6 doses per day 10 mL 0     No current facility-administered medications for this visit.      Past Medical History:   Patient Active Problem List   Diagnosis    Persistent disorder of initiating or maintaining sleep    Hyperlipidemia LDL goal <160    Sinus bradycardia    Hammer toe    Hx of hysterectomy    Basal cell carcinoma of anterior chest s/p excision 11-4-14    Squamous cell carcinoma in situ left chest s/p excision 2-10-15    Basal cell carcinoma of left frontal scalp (hairline) s/p mohs 2-10-15    Elevated blood pressure reading without diagnosis of hypertension    History of nonmelanoma skin cancer    Actinic keratosis    Moderate recurrent major depression (H)     Past Medical History:   Diagnosis Date    Allergy, unspecified not elsewhere classified 2003    s/p desensitization    Anxiety     Atrial tachycardia (H24)     ablation 3/24/11    Bartholin's abscess 7/09    Basal cell carcinoma of anterior chest s/p excision 11-4-14 11/4/2014    Bradycardia     Excessive or frequent menstruation 2001    s/p TVH    Transient disorder of initiating or maintaining sleep 2005        CC No referring provider defined for this encounter. on close of this encounter.

## 2024-05-03 ENCOUNTER — OFFICE VISIT (OUTPATIENT)
Dept: DERMATOLOGY | Facility: CLINIC | Age: 59
End: 2024-05-03
Payer: COMMERCIAL

## 2024-05-03 DIAGNOSIS — D48.9 NEOPLASM OF UNCERTAIN BEHAVIOR: ICD-10-CM

## 2024-05-03 DIAGNOSIS — D49.2 NEOPLASM OF UNSPECIFIED BEHAVIOR OF BONE, SOFT TISSUE, AND SKIN: Primary | ICD-10-CM

## 2024-05-03 PROCEDURE — 11103 TANGNTL BX SKIN EA SEP/ADDL: CPT | Performed by: DERMATOLOGY

## 2024-05-03 PROCEDURE — 99213 OFFICE O/P EST LOW 20 MIN: CPT | Mod: 25 | Performed by: DERMATOLOGY

## 2024-05-03 PROCEDURE — 11104 PUNCH BX SKIN SINGLE LESION: CPT | Performed by: DERMATOLOGY

## 2024-05-03 PROCEDURE — 88305 TISSUE EXAM BY PATHOLOGIST: CPT | Performed by: PATHOLOGY

## 2024-05-03 NOTE — NURSING NOTE
Oumou Carolina's goals for this visit include:   Chief Complaint   Patient presents with    Skin Check     Pt is here for a FBSC. R temple, L lower eye, L flank and middle of abdomen.        She requests these members of her care team be copied on today's visit information:     PCP: Florinda Eaton    Referring Provider:  No referring provider defined for this encounter.    LMP  (LMP Unknown)     Do you need any medication refills at today's visit?     Yesika Hernadez LPN on 5/3/2024 at 8:23 AM

## 2024-05-03 NOTE — LETTER
5/3/2024         RE: Oumou Carolina  [address] [facility] [address]        Dear Colleague,    Thank you for referring your patient, Oumou Carolina, to the [facility]. Please see a copy of my visit note below.      [facility] Dermatology Note  Encounter Date: May 3, 2024  Office Visit     Dermatology Problem List:  Last skin check 05/03/24, due yearly  0. NUB, upper abdomen, s/p bx 05/03/24  0. NUB, central chest, s/pb bx 05/03/24  1. Family history of melanoma (mother)  2. NMSC  - BCC left jaw line, s/p excision 8/27/2018  - BCC, central abdomen, s/p excision 11/25/2015  - BCC, nodular, left frontal scalp, s/p Mohs 2/10/15  - SCCIS, left chest, s/p excision 2/10/15  - BCC, nodular with fibrosis, right chest, s/p excision 11/4/14   3. Actinic Keratosis - s/p cryotherapy, PDT 2019 x 3, Efudex 3/2015  - HAK, left medial cheek, s/p bx 11/30/21, s/p cryo then efudex  - AK, left infraorbital, s/p bx 11/30/21, resolved  - Pigmented AK, central chest, s/p bx 11/30/21, s/p cryo 1/21/22  - Pigmented AK, left chest, s/p bx 8/10/18  4. Seborrheic dermatitis  - Lidex 8/2018  5. Folliculitis, central chest, s/p biopsy 8/10/2018  6. EIC vs dermatofibroma, mid central back  7. Epidermoid cyst, back, s/p excision 2/21/19   8.. Benign biopsies  - Benign follicular tumor, most consistent with pilar sheath acanthoma, s/p bx 12/12/2022  - Scale crust and mild epidermal hyperplasia, L forehead s/p bx 9/23/22, asymptomatic and clinically consistent with biopsy scar 12/12/22  Social History -  passed away 12/2021    ____________________________________________    Assessment & Plan:    # Family History of melanoma in a first degree relative (mother)  - ABCDEs: Counseled ABCDEs of melanoma: Asymmetry, Border (irregularity), Color (not uniform, changes in color), Diameter (greater than 6 mm which is about the size of a pencil eraser), and Evolving (any changes in preexisting  moles).  - Sun protection: Counseled SPF30+ sunscreen, UPF clothing, sun avoidance, tanning bed avoidance.   - Recommended regular skin checks     # History of nonmelanoma skin cancer, no clincial evidence of recurrence.   - Plan as above    # Seborrheic keratosis, symptomatic. L lower lid, R temple   - No treatment necessary unless lesions become symptomatic.      # Neoplasm of unspecified behavior of the skin (D49.2) on the upper abdomen. The differential diagnosis includes cyst. .    - red papule with central pore, present for months   - 4 mm punch biopsy today.  See procedure section.    # Neoplasm of unspecified behavior of the skin (D49.2) on the central chest. The differential diagnosis includes scar or BCC.    - 5 mm Erythematous macule    - Shave biopsy today. See procedure section.       Procedures Performed:   - Punch biopsy procedure note, location(s): central abdomen. After discussion of benefits and risks including but not limited to bleeding, infection, scar, incomplete removal, recurrence, and non-diagnostic biopsy, written consent and photographs were obtained. The area was cleaned with isopropyl alcohol. 0.5mL of 1% lidocaine with epinephrine was injected to obtain adequate anesthesia and a 4 mm punch biopsy was performed at site(s). 3-0 Prolene sutures were utilized to approximate the epidermal edges. White petrolatum ointment and a bandage was applied to the wound. Explicit verbal and written wound care instructions were provided. The patient left the dermatology clinic in good condition.     - Shave biopsy procedure note, location(s): central chest. After discussion of benefits and risks including but not limited to bleeding, infection, scar, incomplete removal, recurrence, and non-diagnostic biopsy, written consent and photographs were obtained. The area was cleaned with isopropyl alcohol. 0.5mL of 1% lidocaine with epinephrine was injected to obtain adequate anesthesia of lesion(s). Shave  biopsy at site(s) performed. Hemostasis was achieved with aluminium chloride. Petrolatum ointment and a sterile dressing were applied. The patient tolerated the procedure and no complications were noted. The patient was provided with verbal and written post care instructions.     Follow-up: 1 ravi dias earlier as needed    Staff and Scribe:     Scribe Disclosure:   I, Erin Varner, am serving as a scribe to document services personally performed by Sonal Peters MD based on data collection and the provider's statements to me.     Provider Disclosure:   The documentation recorded by the scribe accurately reflects the services I personally performed and the decisions made by me.    Sonal Peters MD    Department of Dermatology  [facility]: Phone: [phone], Fax:[phone]  [facility]: Phone: [phone], Fax: [phone]   ____________________________________________    CC: Skin Check (Pt is here for a FBSC. R temple, L lower eye, L flank and middle of abdomen. )    HPI:  Ms. Oumou Carolina is a(n) 59 year old female who presents today as a return patient for skin check.    Today, patient reports spots she would like checked on her left lower eye, R temple, middle abdomen and L flank. Spot on abdomen has been present for months. Sometimes goes away but then returns. Is hard.      Patient is otherwise feeling well, without additional skin concerns.    Labs Reviewed:  Last Derm Path 12/12/22  Final Diagnosis   A(1). Skin, L philtrum, punch:  - Benign follicular tumor, most consistent with pilar sheath acanthoma       Physical Exam:  Vitals: LMP  (LMP Unknown)   SKIN: Total skin including areas was performed. The exam included the head/face, neck, both arms, chest, back, abdomen, both legs, digits and/or nails.     - There is a red papule with central pore on the upper abdomen..   - There are  waxy stuck on tan to brown papules on the R temple and L lower eye lid.  - There is a 5 mm erythematous macule on the central chest.     - No other lesions of concern on areas examined.     Medications:  Current Outpatient Medications   Medication Sig Dispense Refill     albuterol (PROAIR HFA) 108 (90 Base) MCG/ACT inhaler Inhale 1-2 puffs into the lungs every 4 hours as needed for shortness of breath / dyspnea or wheezing 18 g 0     FLUoxetine (PROZAC) 40 MG capsule Take 1 capsule (40 mg) by mouth daily 90 capsule 1     polymixin b-trimethoprim (POLYTRIM) 75739-2.1 UNIT/ML-% ophthalmic solution 1 drop in affected eye(s) every 3 hrs while awake for 5-7 days until resolved - max 6 doses per day 10 mL 0     No current facility-administered medications for this visit.      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 2-10-15     Basal cell carcinoma of left frontal scalp (hairline) s/p mohs 2-10-15     Elevated blood pressure reading without diagnosis of hypertension     History of nonmelanoma skin cancer     Actinic keratosis     Moderate recurrent major depression (H)     Past Medical History:   Diagnosis Date     Allergy, unspecified not elsewhere classified 2003    s/p desensitization     Anxiety      Atrial tachycardia (H24)     ablation 3/24/11     Bartholin's abscess 7/09     Basal cell carcinoma of anterior chest s/p excision 11-4-14 11/4/2014     Bradycardia      Excessive or frequent menstruation 2001    s/p TVH     Transient disorder of initiating or maintaining sleep 2005        CC No referring provider defined for this encounter. on close of this encounter.      Again, thank you for allowing me to participate in the  care of your patient.        Sincerely,        Sonal Peters MD

## 2024-05-06 ASSESSMENT — ASTHMA QUESTIONNAIRES
QUESTION_1 LAST FOUR WEEKS HOW MUCH OF THE TIME DID YOUR ASTHMA KEEP YOU FROM GETTING AS MUCH DONE AT WORK, SCHOOL OR AT HOME: NONE OF THE TIME
ACT_TOTALSCORE: 25
QUESTION_4 LAST FOUR WEEKS HOW OFTEN HAVE YOU USED YOUR RESCUE INHALER OR NEBULIZER MEDICATION (SUCH AS ALBUTEROL): NOT AT ALL
QUESTION_5 LAST FOUR WEEKS HOW WOULD YOU RATE YOUR ASTHMA CONTROL: COMPLETELY CONTROLLED
ACT_TOTALSCORE: 25
QUESTION_3 LAST FOUR WEEKS HOW OFTEN DID YOUR ASTHMA SYMPTOMS (WHEEZING, COUGHING, SHORTNESS OF BREATH, CHEST TIGHTNESS OR PAIN) WAKE YOU UP AT NIGHT OR EARLIER THAN USUAL IN THE MORNING: NOT AT ALL
QUESTION_2 LAST FOUR WEEKS HOW OFTEN HAVE YOU HAD SHORTNESS OF BREATH: NOT AT ALL

## 2024-05-06 ASSESSMENT — ANXIETY QUESTIONNAIRES
GAD7 TOTAL SCORE: 8
IF YOU CHECKED OFF ANY PROBLEMS ON THIS QUESTIONNAIRE, HOW DIFFICULT HAVE THESE PROBLEMS MADE IT FOR YOU TO DO YOUR WORK, TAKE CARE OF THINGS AT HOME, OR GET ALONG WITH OTHER PEOPLE: SOMEWHAT DIFFICULT
GAD7 TOTAL SCORE: 8
1. FEELING NERVOUS, ANXIOUS, OR ON EDGE: SEVERAL DAYS
GAD7 TOTAL SCORE: 8
3. WORRYING TOO MUCH ABOUT DIFFERENT THINGS: SEVERAL DAYS
2. NOT BEING ABLE TO STOP OR CONTROL WORRYING: SEVERAL DAYS
4. TROUBLE RELAXING: MORE THAN HALF THE DAYS
6. BECOMING EASILY ANNOYED OR IRRITABLE: SEVERAL DAYS
7. FEELING AFRAID AS IF SOMETHING AWFUL MIGHT HAPPEN: SEVERAL DAYS
5. BEING SO RESTLESS THAT IT IS HARD TO SIT STILL: SEVERAL DAYS
7. FEELING AFRAID AS IF SOMETHING AWFUL MIGHT HAPPEN: SEVERAL DAYS
8. IF YOU CHECKED OFF ANY PROBLEMS, HOW DIFFICULT HAVE THESE MADE IT FOR YOU TO DO YOUR WORK, TAKE CARE OF THINGS AT HOME, OR GET ALONG WITH OTHER PEOPLE?: SOMEWHAT DIFFICULT

## 2024-05-06 ASSESSMENT — PATIENT HEALTH QUESTIONNAIRE - PHQ9
SUM OF ALL RESPONSES TO PHQ QUESTIONS 1-9: 6
SUM OF ALL RESPONSES TO PHQ QUESTIONS 1-9: 6
10. IF YOU CHECKED OFF ANY PROBLEMS, HOW DIFFICULT HAVE THESE PROBLEMS MADE IT FOR YOU TO DO YOUR WORK, TAKE CARE OF THINGS AT HOME, OR GET ALONG WITH OTHER PEOPLE: SOMEWHAT DIFFICULT

## 2024-05-07 ENCOUNTER — VIRTUAL VISIT (OUTPATIENT)
Dept: FAMILY MEDICINE | Facility: CLINIC | Age: 59
End: 2024-05-07
Payer: COMMERCIAL

## 2024-05-07 ENCOUNTER — TELEPHONE (OUTPATIENT)
Dept: FAMILY MEDICINE | Facility: CLINIC | Age: 59
End: 2024-05-07

## 2024-05-07 DIAGNOSIS — J45.20 MILD INTERMITTENT ASTHMA WITHOUT COMPLICATION: ICD-10-CM

## 2024-05-07 DIAGNOSIS — Z12.11 SCREEN FOR COLON CANCER: ICD-10-CM

## 2024-05-07 DIAGNOSIS — F33.1 MODERATE RECURRENT MAJOR DEPRESSION (H): Primary | ICD-10-CM

## 2024-05-07 PROCEDURE — 99214 OFFICE O/P EST MOD 30 MIN: CPT | Mod: 95 | Performed by: NURSE PRACTITIONER

## 2024-05-07 RX ORDER — FLUOXETINE 40 MG/1
40 CAPSULE ORAL DAILY
Qty: 90 CAPSULE | Refills: 1 | Status: SHIPPED | OUTPATIENT
Start: 2024-05-07

## 2024-05-07 NOTE — PROGRESS NOTES
"    Instructions Relayed to Patient by Virtual Roomer:     Patient is active on [employer]:   Relayed following to patient: \"It looks like you are active on [employer], are you able to join the visit this way? If not, do you need us to send you a link now or would you like your provider to send a link via text or email when they are ready to initiate the visit?\"      Patient Confirmed they will join visit via: [employer]  Reminded patient to ensure they were logged on to virtual visit by arrival time listed.   Asked if patient has flexibility to initiate visit sooner than arrival time: patient stated yes, documented in appointment notes availability to initiate visit earlier than arrival time     If pediatric virtual visit, ensured pediatric patient along with parent/guardian will be present for video visit.     Patient offered the website [email or web address] and/or phone number to [employer] Help line: [phone]  Oumou is a 59 year old who is being evaluated via a billable video visit.    How would you like to obtain your AVS? [employer]  If the video visit is dropped, the invitation should be resent by: Text to cell phone: [phone]  Will anyone else be joining your video visit? No      Assessment & Plan     Screen for colon cancer    - Colonoscopy Screening  Referral; Future    Moderate recurrent major depression (H)  Mood symptoms addressed. Symptoms controlled/stable.  Continue plan.  Healthy self cares.  Will refill as necessary in between visits.  Routine follow-up is every 6 months.  Counseling, as needed.  Patient advised to follow-up if worsening symptoms.  - FLUoxetine (PROZAC) 40 MG capsule; Take 1 capsule (40 mg) by mouth daily    Asthma, mild intermittent - controlled, stable, update AAP            BMI  Estimated body mass index is 34.86 kg/m  as calculated from the following:    Height as of 10/30/23: 1.676 m (5' 6\").    Weight as of 10/30/23: 98 kg (216 lb).   Weight management " plan: continue exercise and healthy habits.       MEDICATIONS:  Continue current medications without change    Subjective   Oumou is a 59 year old, presenting for the following health issues:  No chief complaint on file.        5/7/2024     7:08 AM   Additional Questions   Roomed by Davis     History of Present Illness       Mental Health Follow-up:  Patient presents to follow-up on Depression & Anxiety.Patient's depression since last visit has been:  Better  The patient is not having other symptoms associated with depression.  Patient's anxiety since last visit has been:  Medium  The patient is not having other symptoms associated with anxiety.  Any significant life events: grief or loss  Patient is feeling anxious or having panic attacks.  Patient has no concerns about alcohol or drug use.    She eats 2-3 servings of fruits and vegetables daily.She consumes 0 sweetened beverage(s) daily.She exercises with enough effort to increase her heart rate 30 to 60 minutes per day.  She exercises with enough effort to increase her heart rate 4 days per week.   She is taking medications regularly.     Went back to the gym last fall, [employer], going to early class.   Joined dog groups, doing activity with her dog- training.     Family stressors- daughter's job change did not go smooth.   Her granddaughter's father got involved with a alcohol relapse with driving.   Parents will be visiting.     Work is busy. Works at [employer].     Motivation is good.   In Therapy.   Concentration is work- not mood related.       Asthma- controlled, not needing inhaler.             Review of Systems  Constitutional, HEENT, cardiovascular, pulmonary, gi and gu systems are negative, except as otherwise noted.      Objective           Vitals:  No vitals were obtained today due to virtual visit.    Physical Exam   GENERAL: alert and no distress  EYES: Eyes grossly normal to inspection.  No discharge or erythema, or obvious  scleral/conjunctival abnormalities.  RESP: No audible wheeze, cough, or visible cyanosis.    SKIN: Visible skin clear. No significant rash, abnormal pigmentation or lesions.  NEURO: Cranial nerves grossly intact.  Mentation and speech appropriate for age.  PSYCH: Appropriate affect, tone, and pace of words          Video-Visit Details    Type of service:  Video Visit   Originating Location (pt. Location): Home    Distant Location (provider location):  Off-site  Platform used for Video Visit: Mariaelena  Signed Electronically by: TORIBIO Steen CNP    30 min visit.       Answers submitted by the patient for this visit:  Patient Health Questionnaire (Submitted on 5/6/2024)  If you checked off any problems, how difficult have these problems made it for you to do your work, take care of things at home, or get along with other people?: Somewhat difficult  PHQ9 TOTAL SCORE: 6  MICHELLE-7 (Submitted on 5/6/2024)  MICHELLE 7 TOTAL SCORE: 8  Depression / Anxiety Questionnaire (Submitted on 5/6/2024)  Chief Complaint: Chronic problems general questions HPI Form  Depression/Anxiety: Depression & Anxiety  Depression & Anxiety (Submitted on 5/6/2024)  Chief Complaint: Chronic problems general questions HPI Form  Status since last visit:: better  Anxiety since last: : medium  Other associated symptoms of depression:: No  Other associated symotome: : No  Significant life event: : grief or loss  Anxious:: Yes  Current substance use:: No  General Questionnaire (Submitted on 5/6/2024)  Chief Complaint: Chronic problems general questions HPI Form  How many servings of fruits and vegetables do you eat daily?: 2-3  On average, how many sweetened beverages do you drink each day (Examples: soda, juice, sweet tea, etc.  Do NOT count diet or artificially sweetened beverages)?: 0  How many minutes a day do you exercise enough to make your heart beat faster?: 30 to 60  How many days a week do you exercise enough to make your heart beat faster?: 4  How  many days per week do you miss taking your medication?: 0

## 2024-05-07 NOTE — LETTER
My Asthma Action Plan    Name: Oumou Carolina   YOB: 1965  Date: 5/7/2024   My doctor: TORIBIO Steen CNP   My clinic: [facility]        My Rescue Medicine:   Albuterol inhaler (Proair/Ventolin/Proventil HFA)  2-4 puffs EVERY 4 HOURS as needed. Use a spacer if recommended by your provider.   My Asthma Severity:   Intermittent / Exercise Induced  Know your asthma triggers:  see attached             GREEN ZONE   Good Control  I feel good  No cough or wheeze  Can work, sleep and play without asthma symptoms       Take your asthma control medicine every day.     If exercise triggers your asthma, take your rescue medication  15 minutes before exercise or sports, and  During exercise if you have asthma symptoms  Spacer to use with inhaler: If you have a spacer, make sure to use it with your inhaler             YELLOW ZONE Getting Worse  I have ANY of these:  I do not feel good  Cough or wheeze  Chest feels tight  Wake up at night   Keep taking your Green Zone medications  Start taking your rescue medicine:  every 20 minutes for up to 1 hour. Then every 4 hours for 24-48 hours.  If you stay in the Yellow Zone for more than 12-24 hours, contact your doctor.  If you do not return to the Green Zone in 12-24 hours or you get worse, start taking your oral steroid medicine if prescribed by your provider.           RED ZONE Medical Alert - Get Help  I have ANY of these:  I feel awful  Medicine is not helping  Breathing getting harder  Trouble walking or talking  Nose opens wide to breathe       Take your rescue medicine NOW  If your provider has prescribed an oral steroid medicine, start taking it NOW  Call your doctor NOW  If you are still in the Red Zone after 20 minutes and you have not reached your doctor:  Take your rescue medicine again and  Call 911 or go to the emergency room right away    See your regular doctor within 2 weeks of an Emergency Room or Urgent Care visit for  Patient calling to discuss issues she is having with new medication for seizures. Patient describes tingling in the head as well as a headache that will not go away, along with some nausea. Patient transferred to nurse to discuss further.    follow-up treatment.          Annual Reminders:  Meet with Asthma Educator,  Flu Shot in the Fall, consider Pneumonia Vaccination for patients with asthma (aged 19 and older).    Pharmacy:    [id number] - [place] - [address] [facility] AT [facility] [address]  [facility][id number] - [place] - [address] AT [facility] [address]    Electronically signed by TORIBIO Steen CNP   Date: 05/07/24                    Asthma Triggers  How To Control Things That Make Your Asthma Worse    Triggers are things that make your asthma worse.  Look at the list below to help you find your triggers and   what you can do about them. You can help prevent asthma flare-ups by staying away from your triggers.      Trigger                                                          What you can do   Cigarette Smoke  Tobacco smoke can make asthma worse. Do not allow smoking in your home, car or around you.  Be sure no one smokes at a child s day care or school.  If you smoke, ask your health care provider for ways to help you quit.  Ask family members to quit too.  Ask your health care provider for a referral to Quit Plan to help you quit smoking, or call 2-411-364-PLAN.     Colds, Flu, Bronchitis  These are common triggers of asthma. Wash your hands often.  Don t touch your eyes, nose or mouth.  Get a flu shot every year.     Dust Mites  These are tiny bugs that live in cloth or carpet. They are too small to see. Wash sheets and blankets in hot water every week.   Encase pillows and mattress in dust mite proof covers.  Avoid having carpet if you can. If you have carpet, vacuum weekly.   Use a dust mask and HEPA vacuum.   Pollen and Outdoor Mold  Some people are allergic to trees, grass, or weed pollen, or molds. Try to keep your windows closed.  Limit time out doors when pollen count is high.   Ask you health care provider about taking medicine during allergy season.     Animal  Dander  Some people are allergic to skin flakes, urine or saliva from pets with fur or feathers. Keep pets with fur or feathers out of your home.    If you can t keep the pet outdoors, then keep the pet out of your bedroom.  Keep the bedroom door closed.  Keep pets off cloth furniture and away from stuffed toys.     Mice, Rats, and Cockroaches  Some people are allergic to the waste from these pests.   Cover food and garbage.  Clean up spills and food crumbs.  Store grease in the refrigerator.   Keep food out of the bedroom.   Indoor Mold  This can be a trigger if your home has high moisture. Fix leaking faucets, pipes, or other sources of water.   Clean moldy surfaces.  Dehumidify basement if it is damp and smelly.   Smoke, Strong Odors, and Sprays  These can reduce air quality. Stay away from strong odors and sprays, such as perfume, powder, hair spray, paints, smoke incense, paint, cleaning products, candles and new carpet.   Exercise or Sports  Some people with asthma have this trigger. Be active!  Ask your doctor about taking medicine before sports or exercise to prevent symptoms.    Warm up for 5-10 minutes before and after sports or exercise.     Other Triggers of Asthma  Cold air:  Cover your nose and mouth with a scarf.  Sometimes laughing or crying can be a trigger.  Some medicines and food can trigger asthma.

## 2024-05-12 NOTE — RESULT ENCOUNTER NOTE
nal Diagnosis  A(1). Skin, upper abdomen, punch:  - Epidermal inclusion cyst - harmless cyst  B(2). Skin, central chest, shave:  - Actinic keratosis - precancer, recheck within 6 months in person

## 2024-05-15 ENCOUNTER — TELEPHONE (OUTPATIENT)
Dept: DERMATOLOGY | Facility: CLINIC | Age: 59
End: 2024-05-15
Payer: COMMERCIAL

## 2024-05-15 NOTE — TELEPHONE ENCOUNTER
Writer called pt to discuss pathology results. Pt stated that the wound is healing well with no signs of infection. Pt denied having any questions or concerns at this time. Pt scheduled for 6 month follow up to recheck AK.    Alejandra Donovan RN on 5/15/2024 at 4:24 PM       Final Diagnosis  A(1). Skin, upper abdomen, punch:  - Epidermal inclusion cyst - (see description)     B(2). Skin, central chest, shave:  - Actinic keratosis - (see description)       Electronically signed by Fortunato South MD on 5/7/2024 at  6:58 PM          Result Notes     Sola Nixon LPN  5/13/2024  2:09 PM CDT Back to Top     requesting call back to discuss results.  DALE Knight MD  5/12/2024  5:56 PM CDT     nal Diagnosis  A(1). Skin, upper abdomen, punch:  - Epidermal inclusion cyst - harmless cyst  B(2). Skin, central chest, shave:  - Actinic keratosis - precancer, recheck within 6 months in person

## 2024-06-05 ENCOUNTER — TELEPHONE (OUTPATIENT)
Dept: GASTROENTEROLOGY | Facility: CLINIC | Age: 59
End: 2024-06-05
Payer: COMMERCIAL

## 2024-06-05 ENCOUNTER — HOSPITAL ENCOUNTER (OUTPATIENT)
Facility: AMBULATORY SURGERY CENTER | Age: 59
End: 2024-06-05
Attending: COLON & RECTAL SURGERY
Payer: COMMERCIAL

## 2024-06-05 NOTE — TELEPHONE ENCOUNTER
"Endoscopy Scheduling Screen    Have you had a positive Covid test in the last 14 days?  No    What is your communication preference for Instructions and/or Bowel Prep?   MyChart    What insurance is in the chart?  Other:  [facility]    Ordering/Referring Provider:     MARTIN COLEMAN      (If ordering provider performs procedure, schedule with ordering provider unless otherwise instructed. )    BMI: Estimated body mass index is 34.86 kg/m  as calculated from the following:    Height as of 10/30/23: 1.676 m (5' 6\").    Weight as of 10/30/23: 98 kg (216 lb).     Sedation Ordered  moderate sedation.   If patient BMI > 50 do not schedule in ASC.    If patient BMI > 45 do not schedule at [facility].    Are you taking methadone or Suboxone?  No    Have you had difficulties, pain, or discomfort during past endoscopy procedures?  No    Are you taking any prescription medications for pain 3 or more times per week?   NO, No RN review required.    Do you have a history of malignant hyperthermia?  No    (Females) Are you currently pregnant?        Have you been diagnosed or told you have pulmonary hypertension?   No    Do you have an LVAD?  No    Have you been told you have moderate to severe sleep apnea?  No    Have you been told you have COPD, asthma, or any other lung disease?  No    Do you have any heart conditions?  No     Have you ever had or are you waiting for an organ transplant?  No. Continue scheduling, no site restrictions.    Have you had a stroke or transient ischemic attack (TIA aka \"mini stroke\" in the last 6 months?   No    Have you been diagnosed with or been told you have cirrhosis of the liver?   No    Are you currently on dialysis?   No    Do you need assistance transferring?   No    BMI: Estimated body mass index is 34.86 kg/m  as calculated from the following:    Height as of 10/30/23: 1.676 m (5' 6\").    Weight as of 10/30/23: 98 kg (216 lb).     Is patients BMI > 40 and scheduling location [facility]?  No    Do you take an " injectable medication for weight loss or diabetes (excluding insulin)?  No    Do you take the medication Naltrexone?  No    Do you take blood thinners?  No       Prep   Are you currently on dialysis or do you have chronic kidney disease?  No    Do you have a diagnosis of diabetes?  No    Do you have a diagnosis of cystic fibrosis (CF)?  No    On a regular basis do you go 3 -5 days between bowel movements?  No    BMI > 40?  No    Preferred Pharmacy:    [facility][id number] [place] [address] [facility] AT [address]  [address] [facility] [address]  Phone: [phone] Fax: [phone]    Final Scheduling Details     Procedure scheduled  Colonoscopy    Surgeon:  HARVEY     Date of procedure:  7/30     Pre-OP / PAC:   No - Not required for this site.    Location  [facility] - [facility] - Per order.    Sedation   Moderate Sedation - Per order.      Patient Reminders:   You will receive a call from a Nurse to review instructions and health history.  This assessment must be completed prior to your procedure.  Failure to complete the Nurse assessment may result in the procedure being cancelled.      On the day of your procedure, please designate an adult(s) who can drive you home stay with you for the next 24 hours. The medicines used in the exam will make you sleepy. You will not be able to drive.      You cannot take public transportation, ride share services, or non-medical taxi service without a responsible caregiver.  Medical transport services are allowed with the requirement that a responsible caregiver will receive you at your destination.  We require that drivers and caregivers are confirmed prior to your procedure.

## 2024-07-23 ENCOUNTER — TELEPHONE (OUTPATIENT)
Dept: GASTROENTEROLOGY | Facility: CLINIC | Age: 59
End: 2024-07-23
Payer: COMMERCIAL

## 2024-07-23 NOTE — TELEPHONE ENCOUNTER
Pre visit planning completed.      Procedure details:    Patient scheduled for Colonoscopy on 7/30/24.     Arrival time: 0630. Procedure time 0700    Facility location: [facility]; [address], [place] [address]. Check in location: 2nd Floor at Surgery desk.    Sedation type: Conscious sedation     Pre op exam needed? No.    Indication for procedure: Screening       Chart review:     Electronic implanted devices? No    Recent diagnosis of diverticulitis within the last 6 weeks? No    Diabetic? No      Medication review:    Anticoagulants? No    NSAIDS? No NSAID medications per patient's medication list.  RN will verify with pre-assessment call.    Other medication HOLDING recommendations:  N/A      Prep for procedure:     Bowel prep recommendation: Standard Miralax  Due to: standard bowel prep.    Prep instructions sent via [employer]         Olga Aquino RN  Endoscopy Procedure Pre Assessment RN  [phone] option 4

## 2024-07-23 NOTE — TELEPHONE ENCOUNTER
Attempted to contact patient in order to complete pre assessment questions.     No answer. Left message to return call to [phone] option 4    Callback required communication sent via [facility].      Jessenia Moore RN  Endoscopy Procedure Pre Assessment

## 2024-07-25 NOTE — TELEPHONE ENCOUNTER
Second call attempt to complete pre assessment.     No answer.  Left message to return call to [phone] #4 by next business day prior to 4PM or procedure will be sent to cancel.     Callback required communication sent via [employer].      Olga Aquino RN  Endoscopy Procedure Pre Assessment

## 2024-07-29 ENCOUNTER — TELEPHONE (OUTPATIENT)
Dept: GASTROENTEROLOGY | Facility: CLINIC | Age: 59
End: 2024-07-29
Payer: COMMERCIAL

## 2024-07-29 RX ORDER — LIDOCAINE 40 MG/G
CREAM TOPICAL
Status: CANCELLED | OUTPATIENT
Start: 2024-07-29

## 2024-07-29 RX ORDER — SODIUM CHLORIDE, SODIUM LACTATE, POTASSIUM CHLORIDE, CALCIUM CHLORIDE 600; 310; 30; 20 MG/100ML; MG/100ML; MG/100ML; MG/100ML
INJECTION, SOLUTION INTRAVENOUS CONTINUOUS
Status: CANCELLED | OUTPATIENT
Start: 2024-07-29

## 2024-07-29 RX ORDER — ONDANSETRON 2 MG/ML
4 INJECTION INTRAMUSCULAR; INTRAVENOUS
Status: CANCELLED | OUTPATIENT
Start: 2024-07-29

## 2024-07-29 NOTE — TELEPHONE ENCOUNTER
Caller: Writer called ( 3rd Attempt)    Reason for Reschedule/Cancellation   (please be detailed, any staff messages or encounters to note?):     ----- Message from Olga MENDOZA sent at 7/29/2024  9:49 AM CDT -----  Regarding: cancel  Please cancel colonoscopy on 7/30 due to no return call for PA. Thanks.     Olga      Prior to reschedule please review:  Ordering Provider: Florinda Eaton APRN CNP   Sedation Determined: Moderate  Does patient have any ASC Exclusions, please identify?: N      Notes on Cancelled Procedure:  Procedure: Lower Endoscopy [Colonoscopy]   Date: 07/30  Location: [facility]; [address], [place] [address]   Surgeon: James      Rescheduled: No, LVM to return call to reschedule or complete PA if spot still available.  Removed from Schedule

## 2024-07-29 NOTE — TELEPHONE ENCOUNTER
Staff message sent to cancel per policy.     Olga Aquino, RN   Endoscopy Procedure Pre Assessment RN

## 2024-10-03 ENCOUNTER — MYC MEDICAL ADVICE (OUTPATIENT)
Dept: DERMATOLOGY | Facility: CLINIC | Age: 59
End: 2024-10-03
Payer: COMMERCIAL

## 2024-10-08 NOTE — TELEPHONE ENCOUNTER
Patient returned call to clinic.  She has been rescheduled to see Shona Nicole in [place] on 11/20/2024.

## 2024-10-26 SDOH — HEALTH STABILITY: PHYSICAL HEALTH: ON AVERAGE, HOW MANY DAYS PER WEEK DO YOU ENGAGE IN MODERATE TO STRENUOUS EXERCISE (LIKE A BRISK WALK)?: 5 DAYS

## 2024-10-26 SDOH — HEALTH STABILITY: PHYSICAL HEALTH: ON AVERAGE, HOW MANY MINUTES DO YOU ENGAGE IN EXERCISE AT THIS LEVEL?: 40 MIN

## 2024-10-26 ASSESSMENT — SOCIAL DETERMINANTS OF HEALTH (SDOH): HOW OFTEN DO YOU GET TOGETHER WITH FRIENDS OR RELATIVES?: THREE TIMES A WEEK

## 2024-10-29 NOTE — PROGRESS NOTES
[facility] Dermatology Note  Encounter Date: Nov 20, 2024  Office Visit     Reviewed patients past medical history and pertinent chart review prior to patients visit today.     Dermatology Problem List:    1. Family history of melanoma (mother)  2. NMSC  - BCC left jaw line, s/p excision 8/27/2018  - BCC, central abdomen, s/p excision 11/25/2015  - BCC, nodular, left frontal scalp, s/p Mohs 2/10/15  - SCCIS, left chest, s/p excision 2/10/15  - BCC, nodular with fibrosis, right chest, s/p excision 11/4/14   3. Actinic Keratosis   - AK, left cheek, cryo 11/20/2024  -AK, central chest, s.p bx 05/03/2024, no evidence of recurrence on 11/20/2024  - HAK, left medial cheek, s/p bx 11/30/21, s/p cryo then efudex  - AK, left infraorbital, s/p bx 11/30/21, resolved  - Pigmented AK, central chest, s/p bx 11/30/21, s/p cryo 1/21/22  - Pigmented AK, left chest, s/p bx 8/10/18  - s/p cryotherapy, PDT 2019 x 3, Efudex 3/2015  4. Seborrheic dermatitis  - Lidex 8/2018  5. Folliculitis, central chest, s/p biopsy 8/10/2018  6. EIC vs dermatofibroma, mid central back  - EIC, upper abdomen, s/p punch biopsy 05/03/2024  7. Epidermoid cyst, back, s/p excision 2/21/19   8.. 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   ____________________________________________    Assessment & Plan:     # Neoplasm of uncertain behavior:  upper abdomen  DDx includes Keloid vs Cyst. Punch biopsy today.    Punch biopsy:  After discussion of benefits and risks including but not limited to bleeding/bruising, pain/swelling, infection, scar, incomplete removal, nerve damage/numbness, recurrence, and non-diagnostic biopsy. verbal consent and photographs were obtained. Time-out was performed.  The area was cleaned with isopropyl alcohol. 0.5mL of 1% lidocaine with epinephrine was injected to obtain adequate anesthesia of the lesion. A 10mm punch biopsy was performed.  4-0 monocryl sutures were utilized to approximate the epidermal edges. 5-0 fast absorbing gut used in a running fashion on top.  White petroleum jelly/VaselineTM and a bandage was applied to the wound.  Patient to continue with Vaseline to biopsy site once daily until sutures dissolved.  Explicit verbal and written wound care instructions were provided.  The patient left the Dermatology Clinic in good condition. The patient was counseled that sutures should dissolve on their own.       # actinic keratoses  No evidence of actinic keratosis present on the central chest from area biopsied on 5/3/2024. Actinic keratoses are pre-cancerous skin growths caused by sun exposure. Treatment is recommended and medically indicated. Treated with cryotherapy as outlined below.     Procedures performed:   - Cryotherapy procedure note, location(s): left cheek. After verbal consent and discussion of risks and benefits including, but not limited to, dyspigmentation/scar, blister, and pain, one lesion(s) was(were) treated with 1-2 mm freeze border for 1-2 cycles with liquid nitrogen. Post cryotherapy instructions were provided.       Follow up: [place] 2025 [place] [facility] sooner if needed     Shona Nicole PA-C  [facility]  Dermatology    _______________________________________    CC: No chief complaint on file.    HPI:  Ms. Oumou Carolina is a(n) 59 year old female who presents today as a return patient for follow up for actinic keratosis of the central chest. This was biopsied at her visit with us on 5/3/2024. She is here today to have this rechecked to ensure AK has resolved. Additionally at that visit, a biopsy was obtained from the upper abdomen. This resulted as an epidermal cyst. She believes it is still present and she is wondering about further  treatment as it can be very bothersome to her. Finally, she has a lesion of concern on the left cheek. This lesion has been present for several months and is rough in nature.      Patient is otherwise feeling well, without additional skin concerns.      Physical Exam:  SKIN: Focused examination of left cheek, upper abdomen, central chest was performed.  - NUB, upper abdomen, pink nodule with overlying punctum   -left cheek, pink macule(s) with overlying adherent scale consistent with an actinic keratosis   -central chest, atrophic hypopigmented macule consistent with a biopsy site scar      - No other lesions of concern on areas examined.     Medications:  Current Outpatient Medications   Medication Sig Dispense Refill    albuterol (PROAIR HFA) 108 (90 Base) MCG/ACT inhaler Inhale 1-2 puffs into the lungs every 4 hours as needed for shortness of breath / dyspnea or wheezing 18 g 0    FLUoxetine (PROZAC) 40 MG capsule Take 1 capsule (40 mg) by mouth daily 90 capsule 1     No current facility-administered medications for this visit.      Past Medical History:   Patient Active Problem List   Diagnosis    Persistent disorder of initiating or maintaining sleep    Hyperlipidemia LDL goal <160    Sinus bradycardia    Hammer toe    Hx of hysterectomy    Basal cell carcinoma of anterior chest s/p excision 11-4-14    Squamous cell carcinoma in situ left chest s/p excision 2-10-15    Basal cell carcinoma of left frontal scalp (hairline) s/p mohs 2-10-15    Elevated blood pressure reading without diagnosis of hypertension    History of nonmelanoma skin cancer    Actinic keratosis    Moderate recurrent major depression (H)     Past Medical History:   Diagnosis Date    Allergy, unspecified not elsewhere classified 2003    s/p desensitization    Anxiety     Atrial tachycardia (H)     ablation 3/24/11    Bartholin's abscess 7/09    Basal cell carcinoma of anterior chest s/p excision 11-4-14 11/4/2014    Bradycardia     Excessive or  frequent menstruation 2001    s/p TVH    Transient disorder of initiating or maintaining sleep 2005       CC Sonal Peters MD  [address]  [place] [address] on close of this encounter.

## 2024-11-05 ASSESSMENT — PATIENT HEALTH QUESTIONNAIRE - PHQ9: SUM OF ALL RESPONSES TO PHQ QUESTIONS 1-9: 22

## 2024-11-20 ENCOUNTER — OFFICE VISIT (OUTPATIENT)
Dept: DERMATOLOGY | Facility: CLINIC | Age: 59
End: 2024-11-20
Payer: COMMERCIAL

## 2024-11-20 DIAGNOSIS — L72.0 EIC (EPIDERMAL INCLUSION CYST): ICD-10-CM

## 2024-11-20 DIAGNOSIS — D49.2 NEOPLASM OF UNSPECIFIED BEHAVIOR OF BONE, SOFT TISSUE, AND SKIN: Primary | ICD-10-CM

## 2024-11-20 DIAGNOSIS — L57.0 ACTINIC KERATOSIS: ICD-10-CM

## 2024-11-20 NOTE — LETTER
11/20/2024      Oumou Carolina  [address] [place] [address]      Dear Colleague,    Thank you for referring your patient, Oumou Carolina, to the [facility]. Please see a copy of my visit note below.    [facility] Dermatology Note  Encounter Date: Nov 20, 2024  Office Visit     Reviewed patients past medical history and pertinent chart review prior to patients visit today.     Dermatology Problem List:    1. Family history of melanoma (mother)  2. NMSC  - BCC left jaw line, s/p excision 8/27/2018  - BCC, central abdomen, s/p excision 11/25/2015  - BCC, nodular, left frontal scalp, s/p Mohs 2/10/15  - SCCIS, left chest, s/p excision 2/10/15  - BCC, nodular with fibrosis, right chest, s/p excision 11/4/14   3. Actinic Keratosis   - AK, left cheek, cryo 11/20/2024  -AK, central chest, s.p bx 05/03/2024, no evidence of recurrence on 11/20/2024  - HAK, left medial cheek, s/p bx 11/30/21, s/p cryo then efudex  - AK, left infraorbital, s/p bx 11/30/21, resolved  - Pigmented AK, central chest, s/p bx 11/30/21, s/p cryo 1/21/22  - Pigmented AK, left chest, s/p bx 8/10/18  - s/p cryotherapy, PDT 2019 x 3, Efudex 3/2015  4. Seborrheic dermatitis  - Lidex 8/2018  5. Folliculitis, central chest, s/p biopsy 8/10/2018  6. EIC vs dermatofibroma, mid central back  - EIC, upper abdomen, s/p punch biopsy 05/03/2024  7. Epidermoid cyst, back, s/p excision 2/21/19   8.. 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   ____________________________________________    Assessment & Plan:     # Neoplasm of uncertain behavior:  upper abdomen  DDx includes Keloid vs Cyst. Punch biopsy today.    Punch biopsy:  After discussion of benefits and  risks including but not limited to bleeding/bruising, pain/swelling, infection, scar, incomplete removal, nerve damage/numbness, recurrence, and non-diagnostic biopsy. verbal consent and photographs were obtained. Time-out was performed. The area was cleaned with isopropyl alcohol. 0.5mL of 1% lidocaine with epinephrine was injected to obtain adequate anesthesia of the lesion. A 10mm punch biopsy was performed.  4-0 monocryl sutures were utilized to approximate the epidermal edges. 5-0 fast absorbing gut used in a running fashion on top.  White petroleum jelly/VaselineTM and a bandage was applied to the wound.  Patient to continue with Vaseline to biopsy site once daily until sutures dissolved.  Explicit verbal and written wound care instructions were provided.  The patient left the Dermatology Clinic in good condition. The patient was counseled that sutures should dissolve on their own.       # actinic keratoses  No evidence of actinic keratosis present on the central chest from area biopsied on 5/3/2024. Actinic keratoses are pre-cancerous skin growths caused by sun exposure. Treatment is recommended and medically indicated. Treated with cryotherapy as outlined below.     Procedures performed:   - Cryotherapy procedure note, location(s): left cheek. After verbal consent and discussion of risks and benefits including, but not limited to, dyspigmentation/scar, blister, and pain, one lesion(s) was(were) treated with 1-2 mm freeze border for 1-2 cycles with liquid nitrogen. Post cryotherapy instructions were provided.       Follow up: May 2025 for [facility], sooner if needed     Shona Nicole PA-C  [facility]  Dermatology    _______________________________________    CC: No chief complaint on file.    HPI:  Ms. Oumou Carolina is a(n) 59 year old female who presents today as a return patient for follow up for actinic keratosis of the central chest. This was biopsied at her visit with us on 5/3/2024. She is  here today to have this rechecked to ensure AK has resolved. Additionally at that visit, a biopsy was obtained from the upper abdomen. This resulted as an epidermal cyst. She believes it is still present and she is wondering about further treatment as it can be very bothersome to her. Finally, she has a lesion of concern on the left cheek. This lesion has been present for several months and is rough in nature.      Patient is otherwise feeling well, without additional skin concerns.      Physical Exam:  SKIN: Focused examination of left cheek, upper abdomen, central chest was performed.  - NUB, upper abdomen, pink nodule with overlying punctum   -left cheek, pink macule(s) with overlying adherent scale consistent with an actinic keratosis   -central chest, atrophic hypopigmented macule consistent with a biopsy site scar      - No other lesions of concern on areas examined.     Medications:  Current Outpatient Medications   Medication Sig Dispense Refill     albuterol (PROAIR HFA) 108 (90 Base) MCG/ACT inhaler Inhale 1-2 puffs into the lungs every 4 hours as needed for shortness of breath / dyspnea or wheezing 18 g 0     FLUoxetine (PROZAC) 40 MG capsule Take 1 capsule (40 mg) by mouth daily 90 capsule 1     No current facility-administered medications for this visit.      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 2-10-15     Basal cell carcinoma of left frontal scalp (hairline) s/p mohs 2-10-15     Elevated blood pressure reading without diagnosis of hypertension     History of nonmelanoma skin cancer     Actinic keratosis     Moderate recurrent major depression (H)     Past Medical History:   Diagnosis Date     Allergy,  unspecified not elsewhere classified 2003    s/p desensitization     Anxiety      Atrial tachycardia (H)     ablation 3/24/11     Bartholin's abscess 7/09     Basal cell carcinoma of anterior chest s/p excision 11-4-14 11/4/2014     Bradycardia      Excessive or frequent menstruation 2001    s/p TVH     Transient disorder of initiating or maintaining sleep 2005       CC Sonal Peters MD  [address]  [place] [address] on close of this encounter.       Again, thank you for allowing me to participate in the care of your patient.        Sincerely,        Shona Nicole PA-C

## 2024-11-20 NOTE — NURSING NOTE
Oumou Carolina's goals for this visit include:   Chief Complaint   Patient presents with    Derm Problem     Patient is here for a spot check on the central chest,        She requests these members of her care team be copied on today's visit information:     PCP: Florinda Eaton    Referring Provider:  Sonal Peters MD  [address] [place] [address] [address]  [place] [address]    LMP  (LMP Unknown)     Do you need any medication refills at today's visit?      Gema Lee EMT    The following medication was given:     MEDICATION:  Lidocaine with epinephrine 1% [id number]  ROUTE: SQ  SITE: see procedure note  DOSE: 1ml  LOT #: [id number]  : [employer]  EXPIRATION DATE: 6/30/2026  NDC#: 84712-330-82  Was there drug waste? no  Multi-dose vial: Yes    Gema Lee  November 20, 2024

## 2024-11-25 ENCOUNTER — TELEPHONE (OUTPATIENT)
Dept: DERMATOLOGY | Facility: CLINIC | Age: 59
End: 2024-11-25
Payer: COMMERCIAL

## 2024-11-25 NOTE — TELEPHONE ENCOUNTER
Pt read [employer] message and will call the clinic with any questions or concerns.   Alejandra Donovan RN on 11/25/2024 at 12:42 PM        Final Diagnosis  A. Upper abdomen:  - Hypertrophic scar  - (see comment and description)  Electronically signed by Heena De La Garza MD on 11/25/2024 at 10:39 AM    Cathygaye Oumou,     Your biopsy results from the upper abdomen showed a benign, hypertrophic scar. No further treatment is needed at site. Continue the wound care until the biopsy site is fully healed. If you have any further questions or concerns please send me a message.     Shona Nciole PA-C  [facility]  Dermatology  Written by Shona Nicole PA-C on 11/25/2024 12:07 PM CST  Seen by patient Oumou M Caity on 11/25/2024 12:08 PM

## 2024-12-09 DIAGNOSIS — F33.1 MODERATE RECURRENT MAJOR DEPRESSION (H): ICD-10-CM

## 2024-12-09 RX ORDER — FLUOXETINE 40 MG/1
40 CAPSULE ORAL DAILY
Qty: 30 CAPSULE | Refills: 0 | Status: SHIPPED | OUTPATIENT
Start: 2024-12-09

## 2025-01-01 SDOH — HEALTH STABILITY: PHYSICAL HEALTH: ON AVERAGE, HOW MANY DAYS PER WEEK DO YOU ENGAGE IN MODERATE TO STRENUOUS EXERCISE (LIKE A BRISK WALK)?: 4 DAYS

## 2025-01-01 SDOH — HEALTH STABILITY: PHYSICAL HEALTH: ON AVERAGE, HOW MANY MINUTES DO YOU ENGAGE IN EXERCISE AT THIS LEVEL?: 40 MIN

## 2025-01-01 ASSESSMENT — PATIENT HEALTH QUESTIONNAIRE - PHQ9
SUM OF ALL RESPONSES TO PHQ QUESTIONS 1-9: 8
10. IF YOU CHECKED OFF ANY PROBLEMS, HOW DIFFICULT HAVE THESE PROBLEMS MADE IT FOR YOU TO DO YOUR WORK, TAKE CARE OF THINGS AT HOME, OR GET ALONG WITH OTHER PEOPLE: SOMEWHAT DIFFICULT
SUM OF ALL RESPONSES TO PHQ QUESTIONS 1-9: 8

## 2025-01-01 ASSESSMENT — ASTHMA QUESTIONNAIRES
QUESTION_3 LAST FOUR WEEKS HOW OFTEN DID YOUR ASTHMA SYMPTOMS (WHEEZING, COUGHING, SHORTNESS OF BREATH, CHEST TIGHTNESS OR PAIN) WAKE YOU UP AT NIGHT OR EARLIER THAN USUAL IN THE MORNING: NOT AT ALL
ACT_TOTALSCORE: 25
QUESTION_2 LAST FOUR WEEKS HOW OFTEN HAVE YOU HAD SHORTNESS OF BREATH: NOT AT ALL
ACT_TOTALSCORE: 25
QUESTION_1 LAST FOUR WEEKS HOW MUCH OF THE TIME DID YOUR ASTHMA KEEP YOU FROM GETTING AS MUCH DONE AT WORK, SCHOOL OR AT HOME: NONE OF THE TIME
QUESTION_5 LAST FOUR WEEKS HOW WOULD YOU RATE YOUR ASTHMA CONTROL: COMPLETELY CONTROLLED
QUESTION_4 LAST FOUR WEEKS HOW OFTEN HAVE YOU USED YOUR RESCUE INHALER OR NEBULIZER MEDICATION (SUCH AS ALBUTEROL): NOT AT ALL

## 2025-01-01 ASSESSMENT — SOCIAL DETERMINANTS OF HEALTH (SDOH): HOW OFTEN DO YOU GET TOGETHER WITH FRIENDS OR RELATIVES?: MORE THAN THREE TIMES A WEEK

## 2025-01-02 ENCOUNTER — ORDERS ONLY (AUTO-RELEASED) (OUTPATIENT)
Dept: FAMILY MEDICINE | Facility: CLINIC | Age: 60
End: 2025-01-02

## 2025-01-02 ENCOUNTER — OFFICE VISIT (OUTPATIENT)
Dept: FAMILY MEDICINE | Facility: CLINIC | Age: 60
End: 2025-01-02
Payer: COMMERCIAL

## 2025-01-02 VITALS
OXYGEN SATURATION: 98 % | TEMPERATURE: 98.8 F | RESPIRATION RATE: 12 BRPM | DIASTOLIC BLOOD PRESSURE: 84 MMHG | HEIGHT: 66 IN | HEART RATE: 65 BPM | WEIGHT: 217.1 LBS | SYSTOLIC BLOOD PRESSURE: 130 MMHG | BODY MASS INDEX: 34.89 KG/M2

## 2025-01-02 DIAGNOSIS — E78.5 HYPERLIPIDEMIA LDL GOAL <160: ICD-10-CM

## 2025-01-02 DIAGNOSIS — Z12.31 ENCOUNTER FOR SCREENING MAMMOGRAM FOR BREAST CANCER: ICD-10-CM

## 2025-01-02 DIAGNOSIS — F33.1 MODERATE RECURRENT MAJOR DEPRESSION (H): ICD-10-CM

## 2025-01-02 DIAGNOSIS — Z71.89 ADVANCED DIRECTIVES, COUNSELING/DISCUSSION: ICD-10-CM

## 2025-01-02 DIAGNOSIS — Z12.11 COLON CANCER SCREENING: ICD-10-CM

## 2025-01-02 DIAGNOSIS — Z00.00 ROUTINE GENERAL MEDICAL EXAMINATION AT A HEALTH CARE FACILITY: Primary | ICD-10-CM

## 2025-01-02 LAB
BASOPHILS # BLD AUTO: 0 10E3/UL (ref 0–0.2)
BASOPHILS NFR BLD AUTO: 0 %
CHOLEST SERPL-MCNC: 241 MG/DL
EOSINOPHIL # BLD AUTO: 0.1 10E3/UL (ref 0–0.7)
EOSINOPHIL NFR BLD AUTO: 3 %
ERYTHROCYTE [DISTWIDTH] IN BLOOD BY AUTOMATED COUNT: 12.4 % (ref 10–15)
EST. AVERAGE GLUCOSE BLD GHB EST-MCNC: 103 MG/DL
FASTING STATUS PATIENT QL REPORTED: YES
HBA1C MFR BLD: 5.2 % (ref 0–5.6)
HCT VFR BLD AUTO: 40 % (ref 35–47)
HDLC SERPL-MCNC: 93 MG/DL
HGB BLD-MCNC: 13.5 G/DL (ref 11.7–15.7)
IMM GRANULOCYTES # BLD: 0 10E3/UL
IMM GRANULOCYTES NFR BLD: 0 %
LDLC SERPL CALC-MCNC: 132 MG/DL
LYMPHOCYTES # BLD AUTO: 1.9 10E3/UL (ref 0.8–5.3)
LYMPHOCYTES NFR BLD AUTO: 39 %
MCH RBC QN AUTO: 29.2 PG (ref 26.5–33)
MCHC RBC AUTO-ENTMCNC: 33.8 G/DL (ref 31.5–36.5)
MCV RBC AUTO: 86 FL (ref 78–100)
MONOCYTES # BLD AUTO: 0.4 10E3/UL (ref 0–1.3)
MONOCYTES NFR BLD AUTO: 8 %
NEUTROPHILS # BLD AUTO: 2.5 10E3/UL (ref 1.6–8.3)
NEUTROPHILS NFR BLD AUTO: 50 %
NONHDLC SERPL-MCNC: 148 MG/DL
PLATELET # BLD AUTO: 201 10E3/UL (ref 150–450)
RBC # BLD AUTO: 4.63 10E6/UL (ref 3.8–5.2)
TRIGL SERPL-MCNC: 80 MG/DL
TSH SERPL DL<=0.005 MIU/L-ACNC: 1.57 UIU/ML (ref 0.3–4.2)
WBC # BLD AUTO: 4.9 10E3/UL (ref 4–11)

## 2025-01-02 RX ORDER — BUPROPION HYDROCHLORIDE 150 MG/1
150 TABLET ORAL EVERY MORNING
Qty: 90 TABLET | Refills: 0 | Status: SHIPPED | OUTPATIENT
Start: 2025-01-02

## 2025-01-02 RX ORDER — FLUOXETINE 40 MG/1
40 CAPSULE ORAL DAILY
Qty: 90 CAPSULE | Refills: 0 | Status: SHIPPED | OUTPATIENT
Start: 2025-01-02

## 2025-01-02 ASSESSMENT — PAIN SCALES - GENERAL: PAINLEVEL_OUTOF10: MILD PAIN (3)

## 2025-01-02 NOTE — RESULT ENCOUNTER NOTE
Oumou,  Your labs that have returned are normal. More labs are still processing.     Sincerely,  Florinda Eaton DNP

## 2025-01-02 NOTE — PROGRESS NOTES
Preventive Care Visit  [facility] TORIBIO Box CNP, Family Medicine  Jan 2, 2025      Assessment & Plan     Routine general medical examination at a health care facility  Immunization recommendations reviewed and ordered per discussion and patient agreement.    Discussed medication coverage for vaccines, and if appropriate to have done at pharmacy.    Discussed options and rationale.  Ordered HCM testing per our discussion and patient decision based on USPSTF and Cancer screening guidelines.      Reinforced healthy habits with lifestyle, exercise and nutrition.    - CBC with Platelets & Differential; Future  - Lipid panel reflex to direct LDL Fasting; Future  - Hemoglobin A1c; Future  - TSH with free T4 reflex; Future  - CBC with Platelets & Differential  - Lipid panel reflex to direct LDL Fasting  - Hemoglobin A1c  - TSH with free T4 reflex    Hyperlipidemia LDL goal <160  Healthy HDL- monitoring,   Advised exercise and hip eval.   - Lipid panel reflex to direct LDL Non-fasting; Future  - OFFICE/OUTPT VISIT,EST,LEVL IV    Moderate recurrent major depression (H)  Mood, uncontrolled.    Adding Wellbutrin.   Discussed medication adjustments, side affects and ordered as above.    Recheck in 4 to 6 weeks.  Discussed counseling, as needed.  Continue healthy habits.  Call if worse  - OFFICE/OUTPT VISIT,EST,LEVL IV  - FLUoxetine (PROZAC) 40 MG capsule; Take 1 capsule (40 mg) by mouth daily.  - buPROPion (WELLBUTRIN XL) 150 MG 24 hr tablet; Take 1 tablet (150 mg) by mouth every morning.    Colon cancer screening  Low risk  - COLOGELVIN(EXACT SCIENCES); Future    Encounter for screening mammogram for breast cancer    - MA Screen Bilateral w/Alec; Future    Advanced directives, counseling/discussion  - forms given.         Patient has been advised of split billing requirements and indicates understanding: Yes    Skin tag consult- future scheduling.   Continue to ortho for hip. Pt. Declined PT  "order.     BMI  Estimated body mass index is 34.89 kg/m  as calculated from the following:    Height as of this encounter: 1.68 m (5' 6.14\").    Weight as of this encounter: 98.5 kg (217 lb 1.6 oz).   Weight management plan: Discussed healthy diet and exercise guidelines    Counseling  Appropriate preventive services were addressed with this patient via screening, questionnaire, or discussion as appropriate for fall prevention, nutrition, physical activity, Tobacco-use cessation, social engagement, weight loss and cognition.  Checklist reviewing preventive services available has been given to the patient.  Reviewed patient's diet, addressing concerns and/or questions.   She is at risk for psychosocial distress and has been provided with information to reduce risk.   The patient's PHQ-9 score is consistent with mild depression. She was provided with information regarding depression.       MEDICATIONS:        - Continue other medications without change    Subjective   Oumou is a 59 year old, presenting for the following:  Physical        1/2/2025     6:49 AM   Additional Questions   Roomed by Julia BOWSER   Accompanied by None         1/2/2025     6:49 AM   Patient Reported Additional Medications   Patient reports taking the following new medications NA        HPI  Depression, life stressors.   Caring for her grandson after school.   Right hip pain- has ortho appt set up  Not exercising.   - had some social isolation as not exercising.   In counseling monthly.         Pain History:  When did you first notice your pain? End of October    Have you seen this provider for your pain in the past? No   Where in your body do your have pain? Right Hip   Are you seeing anyone else for your pain? No  What makes your pain better? Rest   What makes your pain worse? Getting up in the morning,   How has pain affected your ability to work? 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 Self         9/5/2023    11:00 AM 5/6/2024     7:19 AM 1/1/2025     4:03 PM   PHQ-9 SCORE   PHQ-9 Total Score MyChart 4 (Minimal depression) 6 (Mild depression) 8 (Mild depression)   PHQ-9 Total Score 4 6 8        Patient-reported           1/12/2023     2:55 PM 10/26/2023     7:27 PM 5/6/2024     7:20 AM   MICHELLE-7 SCORE   Total Score 9 (mild anxiety) 6 (mild anxiety) 8 (mild anxiety)   Total Score 9 6 8                               Lipids- monitoring.       Health Care Directive  Patient does not have a Health Care Directive: Discussed advance care planning with patient; information given to patient to review.      1/1/2025   General Health   How would you rate your overall physical health? Good   Feel stress (tense, anxious, or unable to sleep) Rather much   (!) STRESS CONCERN      1/1/2025   Nutrition   Three or more servings of calcium each day? Yes   Diet: Regular (no restrictions)    Carbohydrate counting   How many servings of fruit and vegetables per day? (!) 0-1   How many sweetened beverages each day? 0-1       Multiple values from one day are sorted in reverse-chronological order         1/1/2025   Exercise   Days per week of moderate/strenous exercise 4 days   Average minutes spent exercising at this level 40 min         1/1/2025   Social Factors   Frequency of gathering with friends or relatives More than three times a week   Worry food won't last until get money to buy more No   Food not last or not have enough money for food? No   Do you have housing? (Housing is defined as stable permanent housing and does not include staying ouside in a car, in a tent, in an abandoned building, in an overnight shelter, or couch-surfing.) Yes   Are you worried about losing your housing? No   Lack of transportation? No   Unable to get utilities (heat,electricity)? No         1/2/2025   Fall Risk   Gait Speed Test (Document in seconds) 3.6   Gait Speed Test Interpretation Less than or equal to  5.00 seconds - PASS          1/1/2025   Dental   Dentist two times every year? Yes         10/26/2024   TB Screening   Were you born outside of the [place]? No       Today's PHQ-9 Score:       1/1/2025     4:03 PM   PHQ-9 SCORE   PHQ-9 Total Score MyChart 8 (Mild depression)   PHQ-9 Total Score 8        Patient-reported         1/1/2025   Substance Use   Alcohol more than 3/day or more than 7/wk Not Applicable   Do you use any other substances recreationally? No     Social History     Tobacco Use    Smoking status: Never    Smokeless tobacco: Never   Vaping Use    Vaping status: Never Used   Substance Use Topics    Alcohol use: No     Comment: none at all     Drug use: Never           11/17/2023   LAST FHS-7 RESULTS   1st degree relative breast or ovarian cancer No   Any relative bilateral breast cancer No   Any male have breast cancer No   Any ONE woman have BOTH breast AND ovarian cancer No   Any woman with breast cancer before 50yrs No   2 or more relatives with breast AND/OR ovarian cancer No   2 or more relatives with breast AND/OR bowel cancer No        Mammogram Screening - Mammogram every 1-2 years updated in Health Maintenance based on mutual decision making        1/1/2025   STI Screening   New sexual partner(s) since last STI/HIV test? No     History of abnormal Pap smear: Status post hysterectomy with removal of cervix and no history of CIN2 or greater or cervical cancer. Health Maintenance and Surgical History updated.       ASCVD Risk   The 10-year ASCVD risk score (Liset HOWARD, et al., 2019) is: 2.2%    Values used to calculate the score:      Age: 59 years      Sex: Female      Is Non- : No      Diabetic: No      Tobacco smoker: No      Systolic Blood Pressure: 130 mmHg      Is BP treated: No      HDL Cholesterol: 86 mg/dL      Total Cholesterol: 234 mg/dL           Reviewed and updated as needed this visit by Provider                          Review of  "Systems  Constitutional, neuro, ENT, endocrine, pulmonary, cardiac, gastrointestinal, genitourinary, musculoskeletal, integument and psychiatric systems are negative, except as otherwise noted.     Objective    Exam  /84   Pulse 65   Temp 98.8  F (37.1  C) (Temporal)   Resp 12   Ht 1.68 m (5' 6.14\")   Wt 98.5 kg (217 lb 1.6 oz)   LMP  (LMP Unknown)   SpO2 98%   BMI 34.89 kg/m     Estimated body mass index is 34.89 kg/m  as calculated from the following:    Height as of this encounter: 1.68 m (5' 6.14\").    Weight as of this encounter: 98.5 kg (217 lb 1.6 oz).    Physical Exam  GENERAL: alert and no distress  EYES: Eyes grossly normal to inspection, PERRL and conjunctivae and sclerae normal  HENT: ear canals and TM's normal, nose and mouth without ulcers or lesions  NECK: no adenopathy, no asymmetry, masses, or scars  RESP: lungs clear to auscultation - no rales, rhonchi or wheezes  BREAST: normal without masses, tenderness or nipple discharge and no palpable axillary masses or adenopathy  CV: regular rate and rhythm, normal S1 S2, no S3 or S4, no murmur, click or rub, no peripheral edema  ABDOMEN: soft, nontender, no hepatosplenomegaly, no masses and bowel sounds normal  MS: no gross musculoskeletal defects noted, no edema, difficult with right hip flexion, ext. Rotation- suspect poss. Labral involvement.   SKIN: no suspicious lesions or rashes, bengn protrusive skin tag/nevi on right mid back.   NEURO: Normal strength and tone, mentation intact and speech normal  PSYCH: mentation appears normal, affect normal/bright, judgement and insight intact, appearance well groomed, and tearful when discussing mood/stressors.         Signed Electronically by: TORIBIO Steen CNP    "

## 2025-01-02 NOTE — PATIENT INSTRUCTIONS
Patient Education   Preventive Care Advice   This is general advice given by our system to help you stay healthy. However, your care team may have specific advice just for you. Please talk to your care team about your preventive care needs.  Nutrition  Eat 5 or more servings of fruits and vegetables each day.  Try wheat bread, brown rice and whole grain pasta (instead of white bread, rice, and pasta).  Get enough calcium and vitamin D. Check the label on foods and aim for 100% of the RDA (recommended daily allowance).  Lifestyle  Exercise at least 150 minutes each week  (30 minutes a day, 5 days a week).  Do muscle strengthening activities 2 days a week. These help control your weight and prevent disease.  No smoking.  Wear sunscreen to prevent skin cancer.  Have a dental exam and cleaning every 6 months.  Yearly exams  See your health care team every year to talk about:  Any changes in your health.  Any medicines your care team has prescribed.  Preventive care, family planning, and ways to prevent chronic diseases.  Shots (vaccines)   HPV shots (up to age 26), if you've never had them before.  Hepatitis B shots (up to age 59), if you've never had them before.  COVID-19 shot: Get this shot when it's due.  Flu shot: Get a flu shot every year.  Tetanus shot: Get a tetanus shot every 10 years.  Pneumococcal, hepatitis A, and RSV shots: Ask your care team if you need these based on your risk.  Shingles shot (for age 50 and up)  General health tests  Diabetes screening:  Starting at age 35, Get screened for diabetes at least every 3 years.  If you are younger than age 35, ask your care team if you should be screened for diabetes.  Cholesterol test: At age 39, start having a cholesterol test every 5 years, or more often if advised.  Bone density scan (DEXA): At age 50, ask your care team if you should have this scan for osteoporosis (brittle bones).  Hepatitis C: Get tested at least once in your life.  STIs (sexually  transmitted infections)  Before age 24: Ask your care team if you should be screened for STIs.  After age 24: Get screened for STIs if you're at risk. You are at risk for STIs (including HIV) if:  You are sexually active with more than one person.  You don't use condoms every time.  You or a partner was diagnosed with a sexually transmitted infection.  If you are at risk for HIV, ask about PrEP medicine to prevent HIV.  Get tested for HIV at least once in your life, whether you are at risk for HIV or not.  Cancer screening tests  Cervical cancer screening: If you have a cervix, begin getting regular cervical cancer screening tests starting at age 21.  Breast cancer scan (mammogram): If you've ever had breasts, begin having regular mammograms starting at age 40. This is a scan to check for breast cancer.  Colon cancer screening: It is important to start screening for colon cancer at age 45.  Have a colonoscopy test every 10 years (or more often if you're at risk) Or, ask your provider about stool tests like a FIT test every year or Cologuard test every 3 years.  To learn more about your testing options, visit:   .  For help making a decision, visit:   https://bit.ly/hh56935.  Prostate cancer screening test: If you have a prostate, ask your care team if a prostate cancer screening test (PSA) at age 55 is right for you.  Lung cancer screening: If you are a current or former smoker ages 50 to 80, ask your care team if ongoing lung cancer screenings are right for you.  For informational purposes only. Not to replace the advice of your health care provider. Copyright   2023 [facility]. All rights reserved. Clinically reviewed by the [facility]. Par[employer] 28 [id number] - REV 01/24.  Preventing Falls: Care Instructions  Injuries and health problems such as trouble walking or poor eyesight can increase your risk of falling. So can some medicines. But there are things you can do to help  "prevent falls. You can exercise to get stronger. You can also arrange your home to make it safer.    Talk to your doctor about the medicines you take. Ask if any of them increase the risk of falls and whether they can be changed or stopped.   Try to exercise regularly. It can help improve your strength and balance. This can help lower your risk of falling.         Practice fall safety and prevention.   Wear low-heeled shoes that fit well and give your feet good support. Talk to your doctor if you have foot problems that make this hard.  Carry a cellphone or wear a medical alert device that you can use to call for help.  Use stepladders instead of chairs to reach high objects. Don't climb if you're at risk for falls. Ask for help, if needed.  Wear the correct eyeglasses, if you need them.        Make your home safer.   Remove rugs, cords, clutter, and furniture from walkways.  Keep your house well lit. Use night-lights in hallways and bathrooms.  Install and use sturdy handrails on stairways.  Wear nonskid footwear, even inside. Don't walk barefoot or in socks without shoes.        Be safe outside.   Use handrails, curb cuts, and ramps whenever possible.  Keep your hands free by using a shoulder bag or backpack.  Try to walk in well-lit areas. Watch out for uneven ground, changes in pavement, and debris.  Be careful in the winter. Walk on the grass or gravel when sidewalks are slippery. Use de-icer on steps and walkways. Add non-slip devices to shoes.    Put grab bars and nonskid mats in your shower or tub and near the toilet. Try to use a shower chair or bath bench when bathing.   Get into a tub or shower by putting in your weaker leg first. Get out with your strong side first. Have a phone or medical alert device in the bathroom with you.   Where can you learn more?  Go to https://www.Outskiwise.net/patiented  Enter G117 in the search box to learn more about \"Preventing Falls: Care Instructions.\"  Current as of: " July 31, 2024  Content Version: 14.3    2024 [employer].   Care instructions adapted under license by your healthcare professional. If you have questions about a medical condition or this instruction, always ask your healthcare professional. [employer] disclaims any warranty or liability for your use of this information.    Learning About Stress  What is stress?     Stress is your body's response to a hard situation. Your body can have a physical, emotional, or mental response. Stress is a fact of life for most people, and it affects everyone differently. What causes stress for you may not be stressful for someone else.  A lot of things can cause stress. You may feel stress when you go on a job interview, take a test, or run a race. This kind of short-term stress is normal and even useful. It can help you if you need to work hard or react quickly. For example, stress can help you finish an important job on time.  Long-term stress is caused by ongoing stressful situations or events. Examples of long-term stress include long-term health problems, ongoing problems at work, or conflicts in your family. Long-term stress can harm your health.  How does stress affect your health?  When you are stressed, your body responds as though you are in danger. It makes hormones that speed up your heart, make you breathe faster, and give you a burst of energy. This is called the fight-or-flight stress response. If the stress is over quickly, your body goes back to normal and no harm is done.  But if stress happens too often or lasts too long, it can have bad effects. Long-term stress can make you more likely to get sick, and it can make symptoms of some diseases worse. If you tense up when you are stressed, you may develop neck, shoulder, or low back pain. Stress is linked to high blood pressure and heart disease.  Stress also harms your emotional health. It can make you morris, tense, or depressed. Your  relationships may suffer, and you may not do well at work or school.  What can you do to manage stress?  You can try these things to help manage stress:   Do something active. Exercise or activity can help reduce stress. Walking is a great way to get started. Even everyday activities such as housecleaning or yard work can help.  Try yoga or mike chi. These techniques combine exercise and meditation. You may need some training at first to learn them.  Do something you enjoy. For example, listen to music or go to a movie. Practice your hobby or do volunteer work.  Meditate. This can help you relax, because you are not worrying about what happened before or what may happen in the future.  Do guided imagery. Imagine yourself in any setting that helps you feel calm. You can use online videos, books, or a teacher to guide you.  Do breathing exercises. For example:  From a standing position, bend forward from the waist with your knees slightly bent. Let your arms dangle close to the floor.  Breathe in slowly and deeply as you return to a standing position. Roll up slowly and lift your head last.  Hold your breath for just a few seconds in the standing position.  Breathe out slowly and bend forward from the waist.  Let your feelings out. Talk, laugh, cry, and express anger when you need to. Talking with supportive friends or family, a counselor, or a daksha leader about your feelings is a healthy way to relieve stress. Avoid discussing your feelings with people who make you feel worse.  Write. It may help to write about things that are bothering you. This helps you find out how much stress you feel and what is causing it. When you know this, you can find better ways to cope.  What can you do to prevent stress?  You might try some of these things to help prevent stress:  Manage your time. This helps you find time to do the things you want and need to do.  Get enough sleep. Your body recovers from the stresses of the day while  "you are sleeping.  Get support. Your family, friends, and community can make a difference in how you experience stress.  Limit your news feed. Avoid or limit time on social media or news that may make you feel stressed.  Do something active. Exercise or activity can help reduce stress. Walking is a great way to get started.  Where can you learn more?  Go to https://www.Indicee.net/patiented  Enter N032 in the search box to learn more about \"Learning About Stress.\"  Current as of: October 24, 2023  Content Version: 14.3    2024 [employer].   Care instructions adapted under license by your healthcare professional. If you have questions about a medical condition or this instruction, always ask your healthcare professional. [employer] disclaims any warranty or liability for your use of this information.    Learning About Depression Screening  What is depression screening?  Depression screening is a way to see if you have depression symptoms. It may be done by a doctor or counselor. It's often part of a routine checkup. That's because your mental health is just as important as your physical health.  Depression is a mental health condition that affects how you feel, think, and act. You may:  Have less energy.  Lose interest in your daily activities.  Feel sad and grouchy for a long time.  Depression is very common. It affects people of all ages.  Many things can lead to depression. Some people become depressed after they have a stroke or find out they have a major illness like cancer or heart disease. The death of a loved one or a breakup may lead to depression. It can run in families. Most experts believe that a combination of inherited genes and stressful life events can cause it.  What happens during screening?  You may be asked to fill out a form about your depression symptoms. You and the doctor will discuss your answers. The doctor may ask you more questions to learn more about how you " "think, act, and feel.  What happens after screening?  If you have symptoms of depression, your doctor will talk to you about your options.  Doctors usually treat depression with medicines or counseling. Often, combining the two works best. Many people don't get help because they think that they'll get over the depression on their own. But people with depression may not get better unless they get treatment.  The cause of depression is not well understood. There may be many factors involved. But if you have depression, it's not your fault.  A serious symptom of depression is thinking about death or suicide. If you or someone you care about talks about this or about feeling hopeless, get help right away.  It's important to know that depression can be treated. Medicine, counseling, and self-care may help.  Where can you learn more?  Go to https://www.[employer] Technology.net/patiented  Enter T185 in the search box to learn more about \"Learning About Depression Screening.\"  Current as of: July 31, 2024  Content Version: 14.3    2024 [employer].   Care instructions adapted under license by your healthcare professional. If you have questions about a medical condition or this instruction, always ask your healthcare professional. [employer] disclaims any warranty or liability for your use of this information.       "

## 2025-01-06 ENCOUNTER — ANCILLARY PROCEDURE (OUTPATIENT)
Dept: GENERAL RADIOLOGY | Facility: CLINIC | Age: 60
End: 2025-01-06
Attending: PHYSICIAN ASSISTANT
Payer: COMMERCIAL

## 2025-01-06 ENCOUNTER — OFFICE VISIT (OUTPATIENT)
Dept: ORTHOPEDICS | Facility: CLINIC | Age: 60
End: 2025-01-06
Payer: COMMERCIAL

## 2025-01-06 VITALS
SYSTOLIC BLOOD PRESSURE: 140 MMHG | HEIGHT: 65 IN | WEIGHT: 217 LBS | DIASTOLIC BLOOD PRESSURE: 86 MMHG | TEMPERATURE: 97.9 F | BODY MASS INDEX: 36.15 KG/M2 | HEART RATE: 75 BPM

## 2025-01-06 DIAGNOSIS — M16.11 PRIMARY OSTEOARTHRITIS OF RIGHT HIP: Primary | ICD-10-CM

## 2025-01-06 DIAGNOSIS — M25.551 RIGHT HIP PAIN: ICD-10-CM

## 2025-01-06 DIAGNOSIS — M25.851 RIGHT HIP IMPINGEMENT SYNDROME: ICD-10-CM

## 2025-01-06 PROCEDURE — 99204 OFFICE O/P NEW MOD 45 MIN: CPT | Performed by: PHYSICIAN ASSISTANT

## 2025-01-06 PROCEDURE — 73502 X-RAY EXAM HIP UNI 2-3 VIEWS: CPT | Mod: TC | Performed by: STUDENT IN AN ORGANIZED HEALTH CARE EDUCATION/TRAINING PROGRAM

## 2025-01-06 ASSESSMENT — PAIN SCALES - GENERAL: PAINLEVEL_OUTOF10: MILD PAIN (3)

## 2025-01-06 NOTE — LETTER
1/6/2025      Oumou Carolina  [address] [facility] [address]      Dear Colleague,    Thank you for referring your patient, Oumou Carolina, to the [facility]. Please see a copy of my visit note below.    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  She enjoys doing CrossFit 4 days a week and also doing dog training agility with her dog who is a coccapoo.    She is being seen for   Chief Complaint   Patient presents with     Musculoskeletal Problem     Right hip     Consult         History of Present Illness:   Mechanism of Injury: No injury fall or trauma  That she can remember other than around the last week of October she was on a sticky mat and felt a pop in her hip when she made a step but is unsure if this is really the cause of the pain.  Location: Right groin pain  Duration of Pain: 3 months  Rating of Pain: 3-10 out of 10  Pain Quality: Achy, she denies sharp pain  Pain is better with: Topical Aspercreme   Pain is worse with: Increased activity  Treatment so far consists of: Rest where she took the month of December off of the gym which did not seem to help.  She does not take medication such as Tylenol or ibuprofen.  She has tried Aspercreme which has helped.  No formal physical therapy no injections.  Associated Features: Denies numbness or tingling shooting burning or electric pain.  Denies any radicular symptoms or any back pain.  Prior history of related problems: No previous surgery or trauma to the right hip  Pain is Limiting: Comfortable use of the right hip with deep squatting and activities such as her gym activities for CrossFit.  Here to: Orthopedic consultation  The Pain Has: Been about the same  Additional History: Patient has had some pain with using the box step with CrossFit.  Patient has stiffness in the mornings which does get better  with time and also stiffness after sitting for a while and going to a standing position.  Patient has had some pain with internal range of motion.  Patient denies any lateral pain of the hip.  Patient has noticed the pain is an aching sensation and also that with more activity equals more pain.      Patient's past medical, surgical, social and family histories reviewed.     Past Medical History:   Diagnosis Date     Allergy, unspecified not elsewhere classified 2003    s/p desensitization     Anxiety      Atrial tachycardia (H)     ablation 3/24/11     Bartholin's abscess 7/09     Basal cell carcinoma of anterior chest s/p excision 11-4-14 11/4/2014     Bradycardia      Excessive or frequent menstruation 2001    s/p TVH     Transient disorder of initiating or maintaining sleep 2005        Past Surgical History:   Procedure Laterality Date     ABDOMEN SURGERY  2001    Hysterectomy     ARTHROPLASTY TOE(S)  12/02/2013    Procedure: ARTHROPLASTY TOE(S);;  Surgeon: Vinayak Palacios DPM;  Location:  OR     BIOPSY  2014, 2016, 2017    skin cancer biopsies, multiple     BUNIONECTOMY  12/02/2013    Procedure: BUNIONECTOMY;  Bunionectomy, resection of arthroplasty of PIP joint right foot;  Surgeon: Vinayak Palacios DPM;  Location: [facility] OR     CARDIAC SURGERY  ablation for abnormal rhythym    2013     COSMETIC SURGERY  abdomiplasty, lipo    6/2016, 4/2017     EYE SURGERY  lasik    11/2007     HC REVISE MEDIAN N/CARPAL TUNNEL SURG      bilateral carpal tunnel repair     HYSTERECTOMY, PAP NO LONGER INDICATED  2001     HYSTERECTOMY, PAP NO LONGER INDICATED  2001     HYSTERECTOMY, VAGINAL  01/30/2001    TVH for menorraghia     REPAIR HAMMER TOE  12/19/2011    Procedure:REPAIR HAMMER TOE; Arthroplasty PIPJ 5th toe, left; Surgeon:VINAYAK PALACIOS; Location:[facility] OR     SURGICAL HISTORY OF -   08/2009    Bilat LE Laser ablation, varicose veins     SURGICAL HISTORY OF -   03/24/2011    atrial ablation     ZZC LIGATE FALLOPIAN TUBE          Medications:  Current Outpatient Medications   Medication Sig Dispense Refill     buPROPion (WELLBUTRIN XL) 150 MG 24 hr tablet Take 1 tablet (150 mg) by mouth every morning. 90 tablet 0     FLUoxetine (PROZAC) 40 MG capsule Take 1 capsule (40 mg) by mouth daily. 90 capsule 0     albuterol (PROAIR HFA) 108 (90 Base) MCG/ACT inhaler Inhale 1-2 puffs into the lungs every 4 hours as needed for shortness of breath / dyspnea or wheezing (Patient not taking: Reported on 1/6/2025) 18 g 0     No current facility-administered medications for this visit.       Allergies   Allergen Reactions     No Known Drug Allergy        Social History     Occupational History     Employer: [employer]   Tobacco Use     Smoking status: Never     Smokeless tobacco: Never   Vaping Use     Vaping status: Never Used   Substance and Sexual Activity     Alcohol use: No     Comment: none at all      Drug use: Never     Sexual activity: Not Currently     Partners: Male     Birth control/protection: Female Surgical     Comment: hysterectomy       Family History   Problem Relation Age of Onset     Obesity Father      Cancer Mother         Skin cancer on lip     Other Cancer Mother         including melonoma     Other Cancer Maternal Grandmother      Other Cancer Paternal Grandfather         Pancreatic cancer     Hypertension No family hx of      Coronary Artery Disease No family hx of      Diabetes No family hx of      Hyperlipidemia No family hx of      Breast Cancer No family hx of      Cancer - colorectal No family hx of      Depression/Anxiety No family hx of      Cerebrovascular Disease No family hx of      Thyroid Disease No family hx of      Asthma No family hx of      Chemical Addiction No family hx of      Known Genetic Syndrome No family hx of      Osteoporosis No family hx of      Anesthesia Reaction No family hx of      Prostate Cancer No family hx of      Ovarian Cancer No family hx of        REVIEW OF SYSTEMS  10 point  "review systems performed otherwise negative as noted as per history of present illness.    Physical Exam:  Vitals: BP (!) 140/86 (BP Location: Right arm, Cuff Size: Adult Regular)   Pulse 75   Temp 97.9  F (36.6  C) (Temporal)   Ht 1.638 m (5' 4.5\")   Wt 98.4 kg (217 lb)   LMP  (LMP Unknown)   BMI 36.67 kg/m    BMI= Body mass index is 36.67 kg/m .    Constitutional: healthy, alert and no acute distress   Psychiatric: mentation appears normal and affect normal/bright  NEURO: no focal deficits, CMS intact right lower extremity   RESP: Normal with easy respirations and no use of accessory muscles to breathe, no audible wheezing or retractions  CV: Calf soft and nontender to palpation, leg warm   SKIN: No erythema, rashes, excoriation, or breakdown. No evidence of infection.   MUSCULOSKELETAL:  INSPECTION of right hip: No gross deformities, erythema, edema, ecchymosis, atrophy or fasciculations.   PALPATION: no tenderness over the lateral hip and greater trochanteric region, thigh or lower leg.   ROM: Passive: Flexion to 100 compared to 120 on the contralateral side, internal rotation to 15 degrees compared to 35 on the contralateral side, external rotation to 50 degrees compared to 70 on the contralateral side.  All range of motion without catching locking but there is pain in all directions especially external ring internal rotation.   STRENGTH: 5 out of 5 hip flexion, quad and hamstring without pain.   SPECIAL TEST: Negative Andrade's maneuver for SI joint pain, positive FADIR maneuver, positive scour maneuver, did not do Edilma's test.   SPECIAL TEST SPINE: Patient has no tenderness on palpation of cervical/thoracic/lumbar spine or SI joints. Negative straight leg raise to 90 . Negative Andrade's maneuver.   GAIT: non-antalgic  Lymph: no palpable lymph nodes    Diagnostic Modalities:  X-rays done today showing mild joint space narrowing seen in the inferior portion of the acetabulum as well as a pincer deformity " on the lateral side of the acetabulum.  No fracture no dislocation no tumor.  No cam deformity noted.  Alignment appropriate.    Independent visualization of the images was performed.    Impression: 1.  Right Hip Primary Osteoarthritis, mild  2.  Right hip impingement      Plan:  All of the above pertinent physical exam and imaging modalities findings was reviewed with Oumou.    FOCUSED PLAN:  Patient's dealt with 3 months of right groin pain that is getting worse and history similar to primary osteoarthritis and exam showing stiffness and pain in the groin area.  We we will try and treat and diagnosis with ultrasound-guided hip steroid injection done by Dr. Parker.  If  the injection gives her good relief then I recommended utilizing NSAIDs if needed although she does not like to take medications and the other treatment could be formal physical therapy which I can put in for her at that time.  Follow-up as needed.    Re-x-ray on return: No      This note was dictated with [employer].    Shaheen Billings PA-C        Again, thank you for allowing me to participate in the care of your patient.        Sincerely,        Shaheen Billings PA-C    Electronically signed

## 2025-01-06 NOTE — PROGRESS NOTES
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  She enjoys doing CrossFit 4 days a week and also doing dog training agility with her dog who is a coccapoo.    She is being seen for   Chief Complaint   Patient presents with    Musculoskeletal Problem     Right hip    Consult         History of Present Illness:   Mechanism of Injury: No injury fall or trauma  That she can remember other than around the last week of October she was on a sticky mat and felt a pop in her hip when she made a step but is unsure if this is really the cause of the pain.  Location: Right groin pain  Duration of Pain: 3 months  Rating of Pain: 3-10 out of 10  Pain Quality: Achy, she denies sharp pain  Pain is better with: Topical Aspercreme   Pain is worse with: Increased activity  Treatment so far consists of: Rest where she took the month of December off of the gym which did not seem to help.  She does not take medication such as Tylenol or ibuprofen.  She has tried Aspercreme which has helped.  No formal physical therapy no injections.  Associated Features: Denies numbness or tingling shooting burning or electric pain.  Denies any radicular symptoms or any back pain.  Prior history of related problems: No previous surgery or trauma to the right hip  Pain is Limiting: Comfortable use of the right hip with deep squatting and activities such as her gym activities for CrossFit.  Here to: Orthopedic consultation  The Pain Has: Been about the same  Additional History: Patient has had some pain with using the box step with CrossFit.  Patient has stiffness in the mornings which does get better with time and also stiffness after sitting for a while and going to a standing position.  Patient has had some pain with internal range of motion.  Patient denies any lateral pain of the hip.  Patient has noticed the pain is an aching  sensation and also that with more activity equals more pain.      Patient's past medical, surgical, social and family histories reviewed.     Past Medical History:   Diagnosis Date    Allergy, unspecified not elsewhere classified 2003    s/p desensitization    Anxiety     Atrial tachycardia (H)     ablation 3/24/11    Bartholin's abscess 7/09    Basal cell carcinoma of anterior chest s/p excision 11-4-14 11/4/2014    Bradycardia     Excessive or frequent menstruation 2001    s/p TVH    Transient disorder of initiating or maintaining sleep 2005        Past Surgical History:   Procedure Laterality Date    ABDOMEN SURGERY  2001    Hysterectomy    ARTHROPLASTY TOE(S)  12/02/2013    Procedure: ARTHROPLASTY TOE(S);;  Surgeon: Vinayak Palacios DPM;  Location: [facility] OR    BIOPSY  2014, 2016, 2017    skin cancer biopsies, multiple    BUNIONECTOMY  12/02/2013    Procedure: BUNIONECTOMY;  Bunionectomy, resection of arthroplasty of PIP joint right foot;  Surgeon: Vinayak Palacios DPM;  Location: [facility] OR    CARDIAC SURGERY  ablation for abnormal rhythym    2013    COSMETIC SURGERY  abdomiplasty, lipo    6/2016, 4/2017    EYE SURGERY  lasik    11/2007    HC REVISE MEDIAN N/CARPAL TUNNEL SURG      bilateral carpal tunnel repair    HYSTERECTOMY, PAP NO LONGER INDICATED  2001    HYSTERECTOMY, PAP NO LONGER INDICATED  2001    HYSTERECTOMY, VAGINAL  01/30/2001    TVH for menorraghia    REPAIR HAMMER TOE  12/19/2011    Procedure:REPAIR HAMMER TOE; Arthroplasty PIPJ 5th toe, left; Surgeon:VINAYAK PALACIOS; Location:[facility] OR    SURGICAL HISTORY OF -   08/2009    Bilat LE Laser ablation, varicose veins    SURGICAL HISTORY OF -   03/24/2011    atrial ablation    ZZC LIGATE FALLOPIAN TUBE         Medications:  Current Outpatient Medications   Medication Sig Dispense Refill    buPROPion (WELLBUTRIN XL) 150 MG 24 hr tablet Take 1 tablet (150 mg) by mouth every morning. 90 tablet 0    FLUoxetine (PROZAC) 40 MG capsule Take 1 capsule (40 mg) by mouth  "daily. 90 capsule 0    albuterol (PROAIR HFA) 108 (90 Base) MCG/ACT inhaler Inhale 1-2 puffs into the lungs every 4 hours as needed for shortness of breath / dyspnea or wheezing (Patient not taking: Reported on 1/6/2025) 18 g 0     No current facility-administered medications for this visit.       Allergies   Allergen Reactions    No Known Drug Allergy        Social History     Occupational History     Employer: [employer]   Tobacco Use    Smoking status: Never    Smokeless tobacco: Never   Vaping Use    Vaping status: Never Used   Substance and Sexual Activity    Alcohol use: No     Comment: none at all     Drug use: Never    Sexual activity: Not Currently     Partners: Male     Birth control/protection: Female Surgical     Comment: hysterectomy       Family History   Problem Relation Age of Onset    Obesity Father     Cancer Mother         Skin cancer on lip    Other Cancer Mother         including melonoma    Other Cancer Maternal Grandmother     Other Cancer Paternal Grandfather         Pancreatic cancer    Hypertension No family hx of     Coronary Artery Disease No family hx of     Diabetes No family hx of     Hyperlipidemia No family hx of     Breast Cancer No family hx of     Cancer - colorectal No family hx of     Depression/Anxiety No family hx of     Cerebrovascular Disease No family hx of     Thyroid Disease No family hx of     Asthma No family hx of     Chemical Addiction No family hx of     Known Genetic Syndrome No family hx of     Osteoporosis No family hx of     Anesthesia Reaction No family hx of     Prostate Cancer No family hx of     Ovarian Cancer No family hx of        REVIEW OF SYSTEMS  10 point review systems performed otherwise negative as noted as per history of present illness.    Physical Exam:  Vitals: BP (!) 140/86 (BP Location: Right arm, Cuff Size: Adult Regular)   Pulse 75   Temp 97.9  F (36.6  C) (Temporal)   Ht 1.638 m (5' 4.5\")   Wt 98.4 kg (217 lb)   LMP  (LMP " Unknown)   BMI 36.67 kg/m    BMI= Body mass index is 36.67 kg/m .    Constitutional: healthy, alert and no acute distress   Psychiatric: mentation appears normal and affect normal/bright  NEURO: no focal deficits, CMS intact right lower extremity   RESP: Normal with easy respirations and no use of accessory muscles to breathe, no audible wheezing or retractions  CV: Calf soft and nontender to palpation, leg warm   SKIN: No erythema, rashes, excoriation, or breakdown. No evidence of infection.   MUSCULOSKELETAL:  INSPECTION of right hip: No gross deformities, erythema, edema, ecchymosis, atrophy or fasciculations.   PALPATION: no tenderness over the lateral hip and greater trochanteric region, thigh or lower leg.   ROM: Passive: Flexion to 100 compared to 120 on the contralateral side, internal rotation to 15 degrees compared to 35 on the contralateral side, external rotation to 50 degrees compared to 70 on the contralateral side.  All range of motion without catching locking but there is pain in all directions especially external ring internal rotation.   STRENGTH: 5 out of 5 hip flexion, quad and hamstring without pain.   SPECIAL TEST: Negative Andrade's maneuver for SI joint pain, positive FADIR maneuver, positive scour maneuver, did not do Edilma's test.   SPECIAL TEST SPINE: Patient has no tenderness on palpation of cervical/thoracic/lumbar spine or SI joints. Negative straight leg raise to 90 . Negative Andrade's maneuver.   GAIT: non-antalgic  Lymph: no palpable lymph nodes    Diagnostic Modalities:  X-rays done today showing mild joint space narrowing seen in the inferior portion of the acetabulum as well as a pincer deformity on the lateral side of the acetabulum.  No fracture no dislocation no tumor.  No cam deformity noted.  Alignment appropriate.    Independent visualization of the images was performed.    Impression: 1.  Right Hip Primary Osteoarthritis, mild  2.  Right hip impingement      Plan:  All of  the above pertinent physical exam and imaging modalities findings was reviewed with Oumou.    FOCUSED PLAN:  Patient's dealt with 3 months of right groin pain that is getting worse and history similar to primary osteoarthritis and exam showing stiffness and pain in the groin area.  We we will try and treat and diagnosis with ultrasound-guided hip steroid injection done by Dr. Parker.  If  the injection gives her good relief then I recommended utilizing NSAIDs if needed although she does not like to take medications and the other treatment could be formal physical therapy which I can put in for her at that time.  Follow-up as needed.    Re-x-ray on return: No      This note was dictated with [employer].    Shaheen Billings PA-C

## 2025-01-07 ENCOUNTER — PATIENT OUTREACH (OUTPATIENT)
Dept: CARE COORDINATION | Facility: CLINIC | Age: 60
End: 2025-01-07
Payer: COMMERCIAL

## 2025-01-23 NOTE — PROGRESS NOTES
Oumou Carolina  :  1965  DOS: 2025  MRN: [id number]    Sports Medicine Clinic Procedure    Ultrasound Guided Right Intra-Articular Hip Injection    Clinical History: primary osteoarthritis of the right hip with MICHAEL (pincer deformity).  Interested in trying a right hip joint corticosteroid injection for diagnostic and therapeutic purposes.    Diagnosis:   1. Primary osteoarthritis of right hip    2. Right hip impingement syndrome      Referring Physician: Shaheen Billings PA-C  Intraarticular Hip Injection - Ultrasound Guided  The patient was informed of the risks and the benefits of the procedure and a written consent was signed.  The patient s hip was prepped with chlorhexidine in sterile fashion.   Local anesthesia was performed using a 25-gauge 1.5-inch needle to administer 3 mL of 1% lidocaine without epinephrine.  1 mL (40 mg/mL) of triamcinolone suspension was drawn up into a 10 mL syringe with 3 mL of 1% lidocaine and 3 mL of 0.5% bupivacaine.   Injection was performed using sterile technique.  Under ultrasound guidance a 3.5-inch 22-gauge needle was used to enter the femoracetabular joint.  Anterior approach was used, needle placement was visualized and documented with ultrasound.  Ultrasound visualization was necessary due to decreased joint space in the setting of osteoarthritis.  Injection performed in-plane to the probe.  Injection solution visualized within the joint space.  Images were permanently stored for the patient's record.  There were no complications. The patient tolerated the procedure well. There was negligible bleeding.      Large Joint Injection/Arthocentesis: R hip joint    Date/Time: 2025 9:36 AM    Performed by: Rom Parker DO  Authorized by: Rom Parker DO    Indications:  Pain  Needle Size:  22 G  Guidance: ultrasound    Approach:  Anterior  Location:  Hip      Site:  R hip joint  Medications:  40 mg triamcinolone 40 MG/ML; 3 mL lidocaine 1 %; 3 mL  BUPivacaine (PF) 0.5 %  Outcome:  Tolerated well, no immediate complications  Procedure discussed: discussed risks, benefits, and alternatives    Consent Given by:  Patient  Prep: patient was prepped and draped in usual sterile fashion     3 mL 1% Lidocaine used for anesthetic     Ultrasound images of procedure were permanently stored.          Impression:  Successful ultrasound-guided right hip joint corticosteroid injection.    Plan:  - Injection:    - Expectations and goals of the injection were discussed and verbal and written consent was obtained.  - Performed a corticosteroid injection of the right hip joint with ultrasound guidance today in clinic. Patient tolerated the procedure well without complications.    - Post-procedure instructions:    - Keep the injection site clean and dry.   - Do not submerge the injection site for 24 hours (no baths, pools). Showers are ok.   - Rest the area for 24-48 hours before resuming normal activities. Avoid overexerting the area for the first few weeks.   - It may take 2-3 days to start noticing the effects of the injection and up to 3-4 weeks to feel significant benefits.   - Follow up:          - With Bryn Billings PA-C as recommended for updates to treatment plan, or sooner for new/worsening symptoms.  - Patient has clinic contact information for questions or concerns.      Rom Parker DO, CAQSM  [facility] - Sports Medicine  [facility] - Department of Orthopedic Surgery     Disclaimer:  This note was prepared and written using Dragon Medical dictation software. As a result, there may be errors in the script that have gone undetected. Please consider this when interpreting the information in this note.

## 2025-01-28 ENCOUNTER — OFFICE VISIT (OUTPATIENT)
Dept: ORTHOPEDICS | Facility: CLINIC | Age: 60
End: 2025-01-28
Attending: PHYSICIAN ASSISTANT
Payer: COMMERCIAL

## 2025-01-28 DIAGNOSIS — M16.11 PRIMARY OSTEOARTHRITIS OF RIGHT HIP: Primary | ICD-10-CM

## 2025-01-28 DIAGNOSIS — M25.851 RIGHT HIP IMPINGEMENT SYNDROME: ICD-10-CM

## 2025-01-28 PROCEDURE — 20611 DRAIN/INJ JOINT/BURSA W/US: CPT | Mod: RT | Performed by: STUDENT IN AN ORGANIZED HEALTH CARE EDUCATION/TRAINING PROGRAM

## 2025-01-28 RX ORDER — BUPIVACAINE HYDROCHLORIDE 5 MG/ML
3 INJECTION, SOLUTION EPIDURAL; INTRACAUDAL
Status: COMPLETED | OUTPATIENT
Start: 2025-01-28 | End: 2025-01-28

## 2025-01-28 RX ORDER — TRIAMCINOLONE ACETONIDE 40 MG/ML
40 INJECTION, SUSPENSION INTRA-ARTICULAR; INTRAMUSCULAR
Status: COMPLETED | OUTPATIENT
Start: 2025-01-28 | End: 2025-01-28

## 2025-01-28 RX ORDER — LIDOCAINE HYDROCHLORIDE 10 MG/ML
3 INJECTION, SOLUTION INFILTRATION; PERINEURAL
Status: COMPLETED | OUTPATIENT
Start: 2025-01-28 | End: 2025-01-28

## 2025-01-28 RX ADMIN — TRIAMCINOLONE ACETONIDE 40 MG: 40 INJECTION, SUSPENSION INTRA-ARTICULAR; INTRAMUSCULAR at 09:36

## 2025-01-28 RX ADMIN — BUPIVACAINE HYDROCHLORIDE 3 ML: 5 INJECTION, SOLUTION EPIDURAL; INTRACAUDAL at 09:36

## 2025-01-28 RX ADMIN — LIDOCAINE HYDROCHLORIDE 3 ML: 10 INJECTION, SOLUTION INFILTRATION; PERINEURAL at 09:36

## 2025-01-28 NOTE — LETTER
2025      Oumou Carolina  [address] [facility] [address]      Dear Colleague,    Thank you for referring your patient, Oumou Carolina, to the [facility]. Please see a copy of my visit note below.    Oumou Carolina  :  1965  DOS: 2025  MRN: [id number]    Sports Medicine Clinic Procedure    Ultrasound Guided Right Intra-Articular Hip Injection    Clinical History: primary osteoarthritis of the right hip with MICHAEL (pincer deformity).  Interested in trying a right hip joint corticosteroid injection for diagnostic and therapeutic purposes.    Diagnosis:   1. Primary osteoarthritis of right hip    2. Right hip impingement syndrome      Referring Physician: Shaheen Billings PA-C  Intraarticular Hip Injection - Ultrasound Guided  The patient was informed of the risks and the benefits of the procedure and a written consent was signed.  The patient s hip was prepped with chlorhexidine in sterile fashion.   Local anesthesia was performed using a 25-gauge 1.5-inch needle to administer 3 mL of 1% lidocaine without epinephrine.  1 mL (40 mg/mL) of triamcinolone suspension was drawn up into a 10 mL syringe with 3 mL of 1% lidocaine and 3 mL of 0.5% bupivacaine.   Injection was performed using sterile technique.  Under ultrasound guidance a 3.5-inch 22-gauge needle was used to enter the femoracetabular joint.  Anterior approach was used, needle placement was visualized and documented with ultrasound.  Ultrasound visualization was necessary due to decreased joint space in the setting of osteoarthritis.  Injection performed in-plane to the probe.  Injection solution visualized within the joint space.  Images were permanently stored for the patient's record.  There were no complications. The patient tolerated the procedure well. There was negligible bleeding.      Large Joint Injection/Arthocentesis: R hip joint    Date/Time: 2025 9:36 AM    Performed by: Keith  DO Rom  Authorized by: Rom Parker DO    Indications:  Pain  Needle Size:  22 G  Guidance: ultrasound    Approach:  Anterior  Location:  Hip      Site:  R hip joint  Medications:  40 mg triamcinolone 40 MG/ML; 3 mL lidocaine 1 %; 3 mL BUPivacaine (PF) 0.5 %  Outcome:  Tolerated well, no immediate complications  Procedure discussed: discussed risks, benefits, and alternatives    Consent Given by:  Patient  Prep: patient was prepped and draped in usual sterile fashion     3 mL 1% Lidocaine used for anesthetic     Ultrasound images of procedure were permanently stored.          Impression:  Successful ultrasound-guided right hip joint corticosteroid injection.    Plan:  - Injection:    - Expectations and goals of the injection were discussed and verbal and written consent was obtained.  - Performed a corticosteroid injection of the right hip joint with ultrasound guidance today in clinic. Patient tolerated the procedure well without complications.    - Post-procedure instructions:    - Keep the injection site clean and dry.   - Do not submerge the injection site for 24 hours (no baths, pools). Showers are ok.   - Rest the area for 24-48 hours before resuming normal activities. Avoid overexerting the area for the first few weeks.   - It may take 2-3 days to start noticing the effects of the injection and up to 3-4 weeks to feel significant benefits.   - Follow up:          - With Bryn Billings PA-C as recommended for updates to treatment plan, or sooner for new/worsening symptoms.  - Patient has clinic contact information for questions or concerns.      Rom Parker DO, CAQSM  [facility] Sports Medicine  [facility] - Department of Orthopedic Surgery     Disclaimer:  This note was prepared and written using Dragon Medical dictation software. As a result, there may be errors in the script that have gone undetected. Please consider this when interpreting the information in this note.         Again, thank you for allowing me to participate in the care of your patient.        Sincerely,        Rom Parker, DO    Electronically signed

## 2025-02-04 ENCOUNTER — PATIENT OUTREACH (OUTPATIENT)
Dept: CARE COORDINATION | Facility: CLINIC | Age: 60
End: 2025-02-04
Payer: COMMERCIAL

## 2025-02-04 LAB — NONINV COLON CA DNA+OCC BLD SCRN STL QL: NEGATIVE

## 2025-02-09 ASSESSMENT — ANXIETY QUESTIONNAIRES
2. NOT BEING ABLE TO STOP OR CONTROL WORRYING: SEVERAL DAYS
3. WORRYING TOO MUCH ABOUT DIFFERENT THINGS: SEVERAL DAYS
IF YOU CHECKED OFF ANY PROBLEMS ON THIS QUESTIONNAIRE, HOW DIFFICULT HAVE THESE PROBLEMS MADE IT FOR YOU TO DO YOUR WORK, TAKE CARE OF THINGS AT HOME, OR GET ALONG WITH OTHER PEOPLE: SOMEWHAT DIFFICULT
7. FEELING AFRAID AS IF SOMETHING AWFUL MIGHT HAPPEN: NOT AT ALL
4. TROUBLE RELAXING: MORE THAN HALF THE DAYS
8. IF YOU CHECKED OFF ANY PROBLEMS, HOW DIFFICULT HAVE THESE MADE IT FOR YOU TO DO YOUR WORK, TAKE CARE OF THINGS AT HOME, OR GET ALONG WITH OTHER PEOPLE?: SOMEWHAT DIFFICULT
GAD7 TOTAL SCORE: 7
7. FEELING AFRAID AS IF SOMETHING AWFUL MIGHT HAPPEN: NOT AT ALL
5. BEING SO RESTLESS THAT IT IS HARD TO SIT STILL: NOT AT ALL
1. FEELING NERVOUS, ANXIOUS, OR ON EDGE: MORE THAN HALF THE DAYS
6. BECOMING EASILY ANNOYED OR IRRITABLE: SEVERAL DAYS

## 2025-02-10 ENCOUNTER — OFFICE VISIT (OUTPATIENT)
Dept: FAMILY MEDICINE | Facility: CLINIC | Age: 60
End: 2025-02-10
Payer: COMMERCIAL

## 2025-02-10 VITALS
HEIGHT: 66 IN | SYSTOLIC BLOOD PRESSURE: 122 MMHG | TEMPERATURE: 99.4 F | BODY MASS INDEX: 35.74 KG/M2 | DIASTOLIC BLOOD PRESSURE: 77 MMHG | WEIGHT: 222.4 LBS | OXYGEN SATURATION: 98 % | HEART RATE: 64 BPM | RESPIRATION RATE: 16 BRPM

## 2025-02-10 DIAGNOSIS — E66.812 CLASS 2 SEVERE OBESITY WITH BODY MASS INDEX (BMI) OF 35 TO 39.9 WITH SERIOUS COMORBIDITY (H): ICD-10-CM

## 2025-02-10 DIAGNOSIS — F41.9 ANXIETY: ICD-10-CM

## 2025-02-10 DIAGNOSIS — F33.1 MODERATE RECURRENT MAJOR DEPRESSION (H): ICD-10-CM

## 2025-02-10 DIAGNOSIS — E66.01 CLASS 2 SEVERE OBESITY WITH BODY MASS INDEX (BMI) OF 35 TO 39.9 WITH SERIOUS COMORBIDITY (H): ICD-10-CM

## 2025-02-10 DIAGNOSIS — M25.551 HIP PAIN, RIGHT: ICD-10-CM

## 2025-02-10 DIAGNOSIS — L91.8 SKIN TAG: Primary | ICD-10-CM

## 2025-02-10 PROCEDURE — 11401 EXC TR-EXT B9+MARG 0.6-1 CM: CPT | Mod: 59 | Performed by: NURSE PRACTITIONER

## 2025-02-10 PROCEDURE — 1126F AMNT PAIN NOTED NONE PRSNT: CPT | Performed by: NURSE PRACTITIONER

## 2025-02-10 PROCEDURE — 3078F DIAST BP <80 MM HG: CPT | Performed by: NURSE PRACTITIONER

## 2025-02-10 PROCEDURE — 11200 RMVL SKIN TAGS UP TO&INC 15: CPT | Performed by: NURSE PRACTITIONER

## 2025-02-10 PROCEDURE — 3074F SYST BP LT 130 MM HG: CPT | Performed by: NURSE PRACTITIONER

## 2025-02-10 PROCEDURE — 99214 OFFICE O/P EST MOD 30 MIN: CPT | Mod: 25 | Performed by: NURSE PRACTITIONER

## 2025-02-10 RX ORDER — BUPROPION HYDROCHLORIDE 75 MG/1
75 TABLET ORAL 2 TIMES DAILY
Qty: 180 TABLET | Refills: 0 | Status: SHIPPED | OUTPATIENT
Start: 2025-02-10

## 2025-02-10 ASSESSMENT — PAIN SCALES - GENERAL: PAINLEVEL_OUTOF10: NO PAIN (0)

## 2025-02-10 NOTE — PROCEDURES
SUBJECTIVE:   Oumou Carolina is a 59 year old female who would like a complete skin check today. There is not personal history of skin cancer. There is no family history of skin cancer. See below for history and description of each lesion.    OBJECTIVE:   Appears well, alert, oriented, pleasant and cooperative. Vitals are as noted by the nurse. Complete skin exam is performed. Lesion on R upper back with patient's observations stated as skin lesion stable without notable changes, exam of this area shows intradermal nevus and skin tag with both featuring sharp borders, one 2 mm in size and the other 3 mm in size.    DOCTOR USE <DOT> LESION REPEATEDLY TO DESCRIBE AS MANY LESIONS AS NEEDED THEN DELETE THIS TEXT.    ASSESSMENT:  intradermal nevus and flesh colored skin tag.    PLAN:   asymptomatic skin lesions as described.   Asymptomatic benign lesions can be observed for changes or symptoms over time. Symptomatic lesions can be treated if she desires; to be scheduled at a later date. Sun protection with sunscreens and clothing to prevent skin cancer is discussed. The signs and symptoms of malignant skin lesions are reviewed with her today.

## 2025-02-10 NOTE — PROGRESS NOTES
Assessment & Plan     Hip pain, right  - Pt had a cortisone shot with orthopedics. Enc to follow up ortho on continued pain. Pt will call and make appointment tomorrow.     Moderate recurrent major depression (H)  -     buPROPion (WELLBUTRIN) 75 MG tablet; Take 1 tablet (75 mg) by mouth 2 times daily. Will adjust Wellbutrin dose so patient will have less symptoms.   - continues to follow up with therapist.   - Continue with current dose of Fluoxetine    Anxiety  - Will adjust Wellbutrin dose so patient will have less symptoms.   -  buPROPion (WELLBUTRIN) 75 MG tablet; Take 1 tablet (75 mg) by mouth 2 times daily.    - continues to follow up with therapist.   - Continue with current dose of Fluoxetine.    Skin tag/mole removal  -Discussed likely benign nature without  absolute certainty.  Due to cost, patient declines pathology.  Discussed risks.  Discussed keeping bandage on if bleeding. Aquaphor to area while healing. Discussed signs and symptoms of infection.      Obesity class II: Continue healthy habits.  Patient is trying to exercise more and eat healthy.    Return to clinic with any new or worsening symptoms, and as needed.    MEDICATIONS:        - Continue other medications without change    The longitudinal plan of care for the diagnosis(es)/condition(s) as documented were addressed during this visit. Due to the added complexity in care, I will continue to support Oumou in the subsequent management and with ongoing continuity of care.    Subjective   Oumou is a 59 year old, presenting for the following health issues:  MH Follow Up, Skin Tags, and Recheck Medication  - Skin tag removal on mid/upper right back near the R bra strap.     Anxiety/depression- Stated that she stopped the Wellbutrin because it made her feel flushed and she felt like she had palpitations. She did state that she felt like she had more energy and was able to focus better while taking it. She's wondering if there's a lower dose.  "Her main concern is the lack of focus.   - Continues to follow up with therapist monthly. Upset that her R hip pain is interfering with her exercising.     Hip pain- Stated she received an injection 2 weeks ago and received immediate relief. She reports that the pain is coming back and plans to call orthopedics tomorrow to follow up.         2/10/2025     9:16 AM   Additional Questions   Roomed by AD   Accompanied by Self     History of Present Illness       Mental Health Follow-up:  Patient presents to follow-up on Depression & Anxiety.Patient's depression since last visit has been:  Better  The patient is not having other symptoms associated with depression.  Patient's anxiety since last visit has been:  No change  The patient is not having other symptoms associated with anxiety.  Any significant life events: grief or loss and health concerns  Patient is feeling anxious or having panic attacks.  Patient has no concerns about alcohol or drug use.    Reason for visit:  Medication follow up and skin tag removal    She eats 2-3 servings of fruits and vegetables daily.She consumes 0 sweetened beverage(s) daily.She exercises with enough effort to increase her heart rate 30 to 60 minutes per day.  She exercises with enough effort to increase her heart rate 3 or less days per week.   She is taking medications regularly.      Medication Followup of Bupropion  Taking Medication as prescribed: NO-Discontinued about 2/6.   Side Effects:  Experiencing racing heart and feeling flush/hot  Medication Helping Symptoms:  Felt more increase in energy but other symptoms counter acted with that.    Review of Systems  Constitutional, HEENT, cardiovascular, pulmonary, gi and gu systems are negative, except as otherwise noted.      Objective    /77 (BP Location: Right arm, Patient Position: Sitting, Cuff Size: Adult Large)   Pulse 64   Temp 99.4  F (37.4  C) (Temporal)   Resp 16   Ht 1.675 m (5' 5.95\")   Wt 100.9 kg (222 lb " 6.4 oz)   LMP  (LMP Unknown)   SpO2 98%   Breastfeeding No   BMI 35.96 kg/m    Body mass index is 35.96 kg/m .  Physical Exam   GENERAL: alert and no distress  RESP: lungs clear to auscultation - no rales, rhonchi or wheezes  CV: regular rate and rhythm, normal S1 S2, no S3 or S4, no murmur, click or rub, no peripheral edema  ABDOMEN: soft, nontender, no hepatosplenomegaly, no masses and bowel sounds normal  MS: no gross musculoskeletal defects noted, no edema  SKIN: round, brown, less than 6 mm nevi located on R side of back, perpendicular from axilla, multiple skin colored skin tags , less than 6 mm in size, noted on back as well       After verbal informed consent:  Patient had two skin tags that she was interested in removing due to them getting caught on her bra straps as they were on her upper R shoulder. Upon further inspection, a mole of 3 mm in size and a skin tag of 2 mm size were going to be excised. The area was cleansed with antiseptic solution. Local anesthesia, 1% lidocaine with epi, was used. Using a Dermablade, both the mole and skin tag were excised, completely with 1 mm margins. Hemostasis was achieved by using Lumicain, appropriate dressing applied.Pt. tolerated well.   Specimens not sent per patient request.       Pt educated to protect area from sun, watch for signs and symptoms of infection          The longitudinal plan of care for the diagnosis(es)/condition(s) as documented were addressed during this visit. Due to the added complexity in care, I will continue to support Oumou in the subsequent management and with ongoing continuity of care.  Signed by Sally Rolon, student nurse practioner    Patient was seen with student who was present for learning. I personally assessed, examined and made clinical decisions reflected in the documentation.  Florinda Eaton, DNP

## 2025-02-12 PROBLEM — E66.812 CLASS 2 SEVERE OBESITY WITH BODY MASS INDEX (BMI) OF 35 TO 39.9 WITH SERIOUS COMORBIDITY (H): Status: ACTIVE | Noted: 2025-02-12

## 2025-02-12 PROBLEM — E66.01 CLASS 2 SEVERE OBESITY WITH BODY MASS INDEX (BMI) OF 35 TO 39.9 WITH SERIOUS COMORBIDITY (H): Status: ACTIVE | Noted: 2025-02-12

## 2025-02-24 ENCOUNTER — OFFICE VISIT (OUTPATIENT)
Dept: ORTHOPEDICS | Facility: CLINIC | Age: 60
End: 2025-02-24
Payer: COMMERCIAL

## 2025-02-24 VITALS — HEIGHT: 66 IN | WEIGHT: 223 LBS | TEMPERATURE: 98.3 F | BODY MASS INDEX: 35.84 KG/M2

## 2025-02-24 DIAGNOSIS — M16.11 PRIMARY OSTEOARTHRITIS OF RIGHT HIP: Primary | ICD-10-CM

## 2025-02-24 DIAGNOSIS — M25.851 RIGHT HIP IMPINGEMENT SYNDROME: ICD-10-CM

## 2025-02-24 PROCEDURE — 99213 OFFICE O/P EST LOW 20 MIN: CPT | Performed by: PHYSICIAN ASSISTANT

## 2025-02-24 ASSESSMENT — PAIN SCALES - GENERAL: PAINLEVEL_OUTOF10: SEVERE PAIN (7)

## 2025-02-24 NOTE — LETTER
"2/24/2025      Oumou Carolina  [address] [facility] [address]      Dear Colleague,    Thank you for referring your patient, Oumou Carolina, to the [facility]. Please see a copy of my visit note below.    Office Visit-Follow up    Chief Complaint: Oumou Carolina is a 60 year old female who is being seen for   Chief Complaint   Patient presents with     RECHECK     1.  Right Hip Primary Osteoarthritis, mild  2.  Right hip impingement            History of Present Illness:   Mechanism of Injury: No injury fall or trauma.  Patient explains she was very active when she had her steroid injection which may have caused it to wear off in 2 weeks.  Location: Right groin.  Denies any lateral hip pain  Duration of Pain: 4 months but 2 weeks of relief with the injection  Rating of Pain: 7 out of 10  Pain Quality: Achy  Pain is better with: Steroid injection  Pain is worse with: Rotation of the hip, ambulation  Treatment so far consists of: Ibuprofen 200 mg x 1 which did not help that much.  Steroid injection intra-articular with ultrasound guidance with 2 weeks of relief.  Activity modification..   Associated Features: Denies numbness or tingling shooting burning or electric pain.  Denies any radicular symptoms.  Denies any lateral hip pain  Pain is Limiting: Comfortable ambulation  Here to: Orthopedic follow-up  Additional History: None    REVIEW OF SYSTEMS  Review of systems negative other than positive findings in HPI.    Physical Exam:  Vitals: Temp 98.3  F (36.8  C) (Temporal)   Ht 1.664 m (5' 5.5\")   Wt 101.2 kg (223 lb)   LMP  (LMP Unknown)   BMI 36.54 kg/m    BMI= Body mass index is 36.54 kg/m .  Constitutional: healthy, alert and no acute distress   Psychiatric: mentation appears normal and affect normal/bright  NEURO: no focal deficits, CMS intact right lower extremity   RESP: Normal with easy respirations and no use of accessory muscles to breathe, no audible wheezing or " retractions  CV: Calf soft and nontender to palpation, leg warm   SKIN: No erythema, rashes, excoriation, or breakdown. No evidence of infection of the skin I see today.  I did not have the patient change into shorts.  MUSCULOSKELETAL:  INSPECTION of right hip: No gross deformities, erythema, edema, ecchymosis, atrophy or fasciculations.   PALPATION: no tenderness over the lateral hip and greater trochanteric region, thigh or lower leg.   ROM: Passive: Flexion to 110 degrees, internal rotation to 15 degrees, external rotation to 40 degrees.  All range of motion without catching locking there is pain with maximal flexion as well as internal and external rotation especially internal.     STRENGTH: 5 out of 5 hip flexion, quad and hamstring without pain.   SPECIAL TEST: Negative Andrade's maneuver, negative FADIR maneuver, negative Scour maneuver, did not do Edilma's test.   SPECIAL TEST SPINE: Patient has no tenderness on palpation of cervical/thoracic/lumbar spine or SI joints. Negative straight leg raise to 90 . Negative Andrade's maneuver.   GAIT: Slight right antalgic gait after examination  Lymph: no palpable lymph nodes      Diagnostic Modalities:  I reviewed x-rays that were done on 1/6/2025 showing mild joint space narrowing seen in the inferior portion of the acetabulum as well as a pincer deformity on the lateral side of the acetabulum.  No fracture no dislocation no tumor.  No cam deformity noted.  Alignment appropriate.     Independent visualization of the images was performed.      Impression: 1.  Right Hip Primary Osteoarthritis, mild  2.  Right hip impingement    Plan:  All of the above pertinent physical exam and imaging modalities findings was reviewed with Oumou.    FOCUSED PLAN:   Patient received about 2 weeks of relief from the right intra-articular steroid injection done by Dr. Parker on 1/28/2025.  She is very active after the injection so she might of wore it but I did give her 100% relief  she stated.  Patient does not like taking pills so we cannot give her oral anti-inflammatory for treatment.  She had not done formal physical therapy so we ordered this today for her for an osteoarthritic program.  I am hoping this helps give her some relief.  I do not suspect labral tear with her so I do not feel she needs an MRI arthrogram.  She could have another injection in 3 months if needed.  Aside from that would be talking to one of the surgeons about hip replacement and if that is something she would want to proceed.  Patient can follow-up on an as-needed basis.    Re-x-ray on return: No      This note was dictated with [employer].    Shaheen Billings PA-C                Again, thank you for allowing me to participate in the care of your patient.        Sincerely,        Shaheen Billings PA-C    Electronically signed

## 2025-02-24 NOTE — PROGRESS NOTES
"Office Visit-Follow up    Chief Complaint: Oumou Carolina is a 60 year old female who is being seen for   Chief Complaint   Patient presents with    RECHECK     1.  Right Hip Primary Osteoarthritis, mild  2.  Right hip impingement            History of Present Illness:   Mechanism of Injury: No injury fall or trauma.  Patient explains she was very active when she had her steroid injection which may have caused it to wear off in 2 weeks.  Location: Right groin.  Denies any lateral hip pain  Duration of Pain: 4 months but 2 weeks of relief with the injection  Rating of Pain: 7 out of 10  Pain Quality: Achy  Pain is better with: Steroid injection  Pain is worse with: Rotation of the hip, ambulation  Treatment so far consists of: Ibuprofen 200 mg x 1 which did not help that much.  Steroid injection intra-articular with ultrasound guidance with 2 weeks of relief.  Activity modification..   Associated Features: Denies numbness or tingling shooting burning or electric pain.  Denies any radicular symptoms.  Denies any lateral hip pain  Pain is Limiting: Comfortable ambulation  Here to: Orthopedic follow-up  Additional History: None    REVIEW OF SYSTEMS  Review of systems negative other than positive findings in HPI.    Physical Exam:  Vitals: Temp 98.3  F (36.8  C) (Temporal)   Ht 1.664 m (5' 5.5\")   Wt 101.2 kg (223 lb)   LMP  (LMP Unknown)   BMI 36.54 kg/m    BMI= Body mass index is 36.54 kg/m .  Constitutional: healthy, alert and no acute distress   Psychiatric: mentation appears normal and affect normal/bright  NEURO: no focal deficits, CMS intact right lower extremity   RESP: Normal with easy respirations and no use of accessory muscles to breathe, no audible wheezing or retractions  CV: Calf soft and nontender to palpation, leg warm   SKIN: No erythema, rashes, excoriation, or breakdown. No evidence of infection of the skin I see today.  I did not have the patient change into " micah.  MUSCULOSKELETAL:  INSPECTION of right hip: No gross deformities, erythema, edema, ecchymosis, atrophy or fasciculations.   PALPATION: no tenderness over the lateral hip and greater trochanteric region, thigh or lower leg.   ROM: Passive: Flexion to 110 degrees, internal rotation to 15 degrees, external rotation to 40 degrees.  All range of motion without catching locking there is pain with maximal flexion as well as internal and external rotation especially internal.     STRENGTH: 5 out of 5 hip flexion, quad and hamstring without pain.   SPECIAL TEST: Negative Andrade's maneuver, negative FADIR maneuver, negative Scour maneuver, did not do Edilma's test.   SPECIAL TEST SPINE: Patient has no tenderness on palpation of cervical/thoracic/lumbar spine or SI joints. Negative straight leg raise to 90 . Negative Andrade's maneuver.   GAIT: Slight right antalgic gait after examination  Lymph: no palpable lymph nodes      Diagnostic Modalities:  I reviewed x-rays that were done on 1/6/2025 showing mild joint space narrowing seen in the inferior portion of the acetabulum as well as a pincer deformity on the lateral side of the acetabulum.  No fracture no dislocation no tumor.  No cam deformity noted.  Alignment appropriate.     Independent visualization of the images was performed.      Impression: 1.  Right Hip Primary Osteoarthritis, mild  2.  Right hip impingement    Plan:  All of the above pertinent physical exam and imaging modalities findings was reviewed with Oumou.    FOCUSED PLAN:   Patient received about 2 weeks of relief from the right intra-articular steroid injection done by Dr. Parker on 1/28/2025.  She is very active after the injection so she might of wore it but I did give her 100% relief she stated.  Patient does not like taking pills so we cannot give her oral anti-inflammatory for treatment.  She had not done formal physical therapy so we ordered this today for her for an osteoarthritic  program.  I am hoping this helps give her some relief.  I do not suspect labral tear with her so I do not feel she needs an MRI arthrogram.  She could have another injection in 3 months if needed.  Aside from that would be talking to one of the surgeons about hip replacement and if that is something she would want to proceed.  Patient can follow-up on an as-needed basis.    Re-x-ray on return: No      This note was dictated with [employer].    Shaheen Billings PA-C

## 2025-03-07 ASSESSMENT — ACTIVITIES OF DAILY LIVING (ADL)
SPORTS_SCORE(%): 0
ADL_COUNT: 17
WALKING_DOWN_STEEP_HILLS: NO DIFFICULTY AT ALL
ADL_TOTAL_ITEM_SCORE: 0
LANDING: EXTREME DIFFICULTY
WALKING_FOR_APPROXIMATELY_10_MINUTES: NO DIFFICULTY AT ALL
ABILITY_TO_PARTICIPATE_IN_YOUR_DESIRED_SPORT_AS_LONG_AS_YOU_WOULD_LIKE: UNABLE TO DO
GOING UP 1 FLIGHT OF STAIRS: MODERATE DIFFICULTY
HOS_ADL_HIGHEST_POTENTIAL_SCORE: 64
HOS_ADL_ITEM_SCORE_TOTAL: 39
LOW_IMPACT_ACTIVITIES_LIKE_FAST_WALKING: MODERATE DIFFICULTY
HEAVY_WORK: MODERATE DIFFICULTY
PUTTING_ON_SOCKS_AND_SHOES: MODERATE DIFFICULTY
HOS_ADL_SCORE(%): 60.94
STANDING FOR 15 MINUTES: SLIGHT DIFFICULTY
RECREATIONAL_ACTIVITIES: MODERATE DIFFICULTY
ADL_SCORE(%): 0
HOW_WOULD_YOU_RATE_YOUR_CURRENT_LEVEL_OF_FUNCTION_DURING_YOUR_SPORTS_RELATED_ACTIVITIES_FROM_0_TO_100_WITH_100_BEING_YOUR_LEVEL_OF_FUNCTION_PRIOR_TO_YOUR_HIP_PROBLEM_AND_0_BEING_THE_INABILITY_TO_PERFORM_ANY_OF_YOUR_USUAL_DAILY_ACTIVITIES?: 10
DEEP_SQUATTING: MODERATE DIFFICULTY
GOING DOWN 1 FLIGHT OF STAIRS: NO DIFFICULTY AT ALL
WALKING_UP_STEEP_HILLS: MODERATE DIFFICULTY
WALKING_INITIALLY: EXTREME DIFFICULTY
STARTING_AND_STOPPING_QUICKLY: EXTREME DIFFICULTY
ADL_HIGHEST_POTENTIAL_SCORE: 68
SPORTS_COUNT: 9
DEEP SQUATTING: MODERATE DIFFICULTY
SPORTS_HIGHEST_POTENTIAL_SCORE: 36
WALKING_INITIALLY: EXTREME DIFFICULTY
WALKING_APPROXIMATELY_10_MINUTES: NO DIFFICULTY AT ALL
JUMPING: UNABLE TO DO
WALKING_15_MINUTES_OR_GREATER: MODERATE DIFFICULTY
HOW_WOULD_YOU_RATE_YOUR_CURRENT_LEVEL_OF_FUNCTION?: SEVERELY ABNORMAL
ABILITY_TO_PERFORM_ACTIVITY_WITH_YOUR_NORMAL_TECHNIQUE: UNABLE TO DO
GOING_UP_1_FLIGHT_OF_STAIRS: MODERATE DIFFICULTY
PLEASE_INDICATE_YOR_PRIMARY_REASON_FOR_REFERRAL_TO_THERAPY:: HIP
STANDING_FOR_15_MINUTES: SLIGHT DIFFICULTY
TWISTING/PIVOTING ON INVOLVED LEG: EXTREME DIFFICULTY
RUNNING_ONE_MILE: UNABLE TO DO
WALKING_15_MINUTES_OR_GREATER: MODERATE DIFFICULTY
GETTING_INTO_AND_OUT_OF_AN_AVERAGE_CAR: MODERATE DIFFICULTY
SITTING_FOR_15_MINUTES: SLIGHT DIFFICULTY
WALKING_DOWN_STEEP_HILLS: NO DIFFICULTY AT ALL
GOING_DOWN_1_FLIGHT_OF_STAIRS: NO DIFFICULTY AT ALL
SITTING FOR 15 MINUTES: SLIGHT DIFFICULTY
LIGHT_TO_MODERATE_WORK: SLIGHT DIFFICULTY
LIGHT_TO_MODERATE_WORK: SLIGHT DIFFICULTY
STEPPING_UP_AND_DOWN_CURBS: SLIGHT DIFFICULTY
CUTTING/LATERAL_MOVEMENTS: EXTREME DIFFICULTY
ROLLING_OVER_IN_BED: MODERATE DIFFICULTY
SPORTS_TOTAL_ITEM_SCORE: 0
TWISTING/PIVOTING_ON_INVOLVED_LEG: EXTREME DIFFICULTY
WALKING_UP_STEEP_HILLS: MODERATE DIFFICULTY
HEAVY_WORK: MODERATE DIFFICULTY
ROLLING OVER IN BED: MODERATE DIFFICULTY
STEPPING UP AND DOWN CURBS: SLIGHT DIFFICULTY
PUTTING ON SOCKS AND SHOES: MODERATE DIFFICULTY
GETTING INTO AND OUT OF AN AVERAGE CAR: MODERATE DIFFICULTY
RECREATIONAL ACTIVITIES: MODERATE DIFFICULTY

## 2025-03-12 ENCOUNTER — THERAPY VISIT (OUTPATIENT)
Dept: PHYSICAL THERAPY | Facility: CLINIC | Age: 60
End: 2025-03-12
Attending: PHYSICIAN ASSISTANT
Payer: COMMERCIAL

## 2025-03-12 DIAGNOSIS — M16.11 PRIMARY OSTEOARTHRITIS OF RIGHT HIP: ICD-10-CM

## 2025-03-12 DIAGNOSIS — M25.851 RIGHT HIP IMPINGEMENT SYNDROME: ICD-10-CM

## 2025-03-12 DIAGNOSIS — M25.551 HIP PAIN, RIGHT: Primary | ICD-10-CM

## 2025-03-12 PROCEDURE — 97161 PT EVAL LOW COMPLEX 20 MIN: CPT | Mod: GP | Performed by: PHYSICAL THERAPIST

## 2025-03-12 PROCEDURE — 97110 THERAPEUTIC EXERCISES: CPT | Mod: GP | Performed by: PHYSICAL THERAPIST

## 2025-03-12 NOTE — PROGRESS NOTES
PHYSICAL THERAPY EVALUATION  Type of Visit: Evaluation       Fall Risk Screen:  Fall screen completed by: PT  Have you fallen 2 or more times in the past year?: Yes  Have you fallen and had an injury in the past year?: Yes  Is patient a fall risk?: No  Fall screen comments: slipped on ice x 2 last year    Subjective         Presenting condition or subjective complaint: increasing hip pain attributed to arthritis  Date of onset: 11/20/24 (approx)  Possibly from progressing box step at [employer]  Relevant medical history:     Dates & types of surgery:      Prior diagnostic imaging/testing results: X-ray   - No fracture or dislocation. Moderate right and mild left hip joint space narrowing. Degenerative changes of the lower lumbar spine.   Prior therapy history for the same diagnosis, illness or injury: No      Prior Level of Function  Transfers: Independent  Ambulation: Independent  ADL: Independent  IADL:     Living Environment  Social support: Alone   Type of home: House; 1 level   Stairs to enter the home: Yes 4 Is there a railing: Yes     Ramp: No   Stairs inside the home: No       Help at home: None  Equipment owned: Raised toilet seat     Employment: Yes Sr Capability Manager  Hobbies/Interests: running my dog in agility playing piano [employer]    Patient goals for therapy: return to may daily workouts    Pain assessment:      Objective   HIP EVALUATION  PAIN: Pain Level at Rest: 0/10  Pain Level with Use: 10/10  Pain Location: hip  Pain Quality: Aching and Sharp  Pain Frequency: intermittent  Pain is Worst: daytime  Pain is Exacerbated By: sit to stand;  initial walking;  up stairs; walk .25 miles;  agility work with dog;  lay on R   Pain is Relieved By: none  Pain Progression: Worsened  INTEGUMENTARY (edema, incisions):   POSTURE:   GAIT:   Weightbearing Status: WBAT  Assistive Device(s): None  Gait Deviations: Antalgic  Circumduction R  BALANCE/PROPRIOCEPTION:  decreased R versus L - increased instability on  R  WEIGHTBEARING ALIGNMENT:   NON-WEIGHTBEARING ALIGNMENT:    ROM:  end range pain on R with flex, IR, ER    PELVIC/SI SCREEN:   STRENGTH:   Pain: - none + mild ++ moderate +++ severe  Strength Scale: 0-5/5 Left Right   Hip Flexion 5 4, ++ (mod)   Hip Extension 5 4+   Hip Abduction  4   Hip Adduction  5-   Hip Internal Rotation  4+, + (mild)   Hip External Rotation  4+, + (mild)   Knee Flexion  5   Knee Extension  5     LE FLEXIBILITY:   SPECIAL TESTS:    Left Right   BRANDT Negative  Positive   FADIR/Labrum/MICHAEL  Positive   Femoral Nerve     Edilma's     Piriformis     Quadrant Testing     SLR  Negative    Slump  Negative    Stork with Extension     Malachi            FUNCTIONAL TESTS:   PALPATION:   + Tenderness At Location Left Right   Ischial Tuberosity     Greater Trochanter  +   IT Band     Hip Flexors  +   Piriformis     PSIS     ASIS     Adductors     Abductors     Iliac Crest     Glut Medius     Bursa     Pubis       JOINT MOBILITY:     Assessment & Plan   CLINICAL IMPRESSIONS  Medical Diagnosis: primary OA R hip/ R hip impingement    Treatment Diagnosis: R hip pain   Impression/Assessment: Patient is a 60 year old female with R hip  complaints.  The following significant findings have been identified: Pain, Decreased ROM/flexibility, Decreased strength, Impaired gait, Impaired muscle performance, and Decreased activity tolerance. These impairments interfere with their ability to perform self care tasks, work tasks, recreational activities, household chores, driving , household mobility, and community mobility as compared to previous level of function.     Clinical Decision Making (Complexity):  Clinical Presentation: Stable/Uncomplicated  Clinical Presentation Rationale: based on medical and personal factors listed in PT evaluation  Clinical Decision Making (Complexity): Low complexity    PLAN OF CARE  Treatment Interventions:  Interventions: Gait Training, Manual Therapy, Neuromuscular Re-education,  Therapeutic Activity, Therapeutic Exercise    Long Term Goals     PT Goal 1  Goal Identifier: R hip  Goal Description: Pt able to ambulate x 1 mile without pain  Rationale: to maximize safety and independence with performance of ADLs and functional tasks;to maximize safety and independence within the home;to maximize safety and independence with transportation;to maximize safety and independence within the community;to maximize safety and independence with self cares  Target Date: 06/12/25      Frequency of Treatment: 2x/ month  Duration of Treatment: 3 month    Recommended Referrals to Other Professionals:   Education Assessment:   Learner/Method: Patient;Demonstration;Pictures/Video    Risks and benefits of evaluation/treatment have been explained.   Patient/Family/caregiver agrees with Plan of Care.     Evaluation Time:     PT Eval, Low Complexity Minutes (36333): 15       Signing Clinician: Malachi Shelley PT

## 2025-04-04 NOTE — PROGRESS NOTES
Oumou Carolina  :  1965  DOS: 2025  MRN: [id number]  PCP: Florinda Eaton    Sports Medicine Clinic Visit    HPI  Oumou Carolina is a 60 year old female who is seen as a self referral presenting with right knee pain.    - Mechanism of Injury:    -  Was a frequent runner over 10 years ago. Pain since then. No acute injury onset  - Pertinent history and prior evaluations:    -  Had been getting injections through [facility] but doctor has retired , very helpful for greater than 1 year of relief with each injection.      - MRI R knee  shows small contusions in the medial femoral condyle and medial tibial plateau, no meniscal or ligamentous injury.   - XR R knee  shows mild tricompartmental osteoarthritis.    - Pain Character:    - Location:  Right anterolateral knee  - Character:  aching  - Duration:  10 years  - Course:  waxing and waning  - Endorses:    -  pain and popping , achy toothache pain in the lateral knee and that hurts worse with weightbearing activities, slight swelling at the lateral joint line  - Denies:    - grinding, numbness, tingling, instability, mechanical locking symptoms, diffuse knee swelling  - Alleviating factors:    -  corticosteroid injection , rest, activity modifications  - Aggravating factors:    -  general knee discomfort with weightbearing activities  - Other treatments tried:    - corticosteroid injections with [facility]. Last one about a year and a half ago and lasted about 15 months. Ibuprofen bothers her stomach.    - Patient Goals:    - get a formal diagnosis, discuss treatment options  - Social History:   -  Employed. Desk work      Review of Systems  Musculoskeletal: as above  Remainder of review of systems is negative including constitutional, CV, pulmonary, GI, Skin and Neurologic except as noted in HPI or medical history.    Past Medical History:   Diagnosis Date    Allergy, unspecified not elsewhere classified     s/p desensitization    Anxiety     Atrial  tachycardia     ablation 3/24/11    Bartholin's abscess 7/09    Basal cell carcinoma of anterior chest s/p excision 11-4-14 11/4/2014    Bradycardia     Excessive or frequent menstruation 2001    s/p TVH    Transient disorder of initiating or maintaining sleep 2005     Past Surgical History:   Procedure Laterality Date    ABDOMEN SURGERY  2001    Hysterectomy    ARTHROPLASTY TOE(S)  12/02/2013    Procedure: ARTHROPLASTY TOE(S);;  Surgeon: Vinayak Palacios DPM;  Location: [facility]    BIOPSY  2014, 2016, 2017    skin cancer biopsies, multiple    BUNIONECTOMY  12/02/2013    Procedure: BUNIONECTOMY;  Bunionectomy, resection of arthroplasty of PIP joint right foot;  Surgeon: Vinayak Palacios DPM;  Location: [facility] OR    CARDIAC SURGERY  ablation for abnormal rhythym    2013    COSMETIC SURGERY  abdomiplasty, lipo    6/2016, 4/2017    EYE SURGERY  lasik    11/2007    [facility] REVISE MEDIAN N/CARPAL TUNNEL SURG      bilateral carpal tunnel repair    HYSTERECTOMY, PAP NO LONGER INDICATED  2001    HYSTERECTOMY, PAP NO LONGER INDICATED  2001    HYSTERECTOMY, VAGINAL  01/30/2001    TVH for menorraghia    OK SKIN TAG REMOVAL 1 TO 8 LESIONS PER LESION  2/10/2025    REPAIR HAMMER TOE  12/19/2011    Procedure:REPAIR HAMMER TOE; Arthroplasty PIPJ 5th toe, left; Surgeon:VINAYAK PALACIOS; Location:[facility]    SURGICAL HISTORY OF -   08/2009    Bilat LE Laser ablation, varicose veins    SURGICAL HISTORY OF -   03/24/2011    atrial ablation    ZZC LIGATE FALLOPIAN TUBE       Family History   Problem Relation Age of Onset    Obesity Father     Cancer Mother         Skin cancer on lip    Other Cancer Mother         including melonoma    Other Cancer Maternal Grandmother     Other Cancer Paternal Grandfather         Pancreatic cancer    Hypertension No family hx of     Coronary Artery Disease No family hx of     Diabetes No family hx of     Hyperlipidemia No family hx of     Breast Cancer No family hx of     Cancer - colorectal No family hx of      Depression/Anxiety No family hx of     Cerebrovascular Disease No family hx of     Thyroid Disease No family hx of     Asthma No family hx of     Chemical Addiction No family hx of     Known Genetic Syndrome No family hx of     Osteoporosis No family hx of     Anesthesia Reaction No family hx of     Prostate Cancer No family hx of     Ovarian Cancer No family hx of          Objective  LMP  (LMP Unknown)     General: healthy, alert and in no acute distress.    HEENT: no scleral icterus or conjunctival erythema.   Skin: no suspicious lesions or rash. No jaundice.   CV: regular rhythm by palpation, 2+ distal pulses.  Resp: normal respiratory effort without conversational dyspnea.   Psych: normal mood and affect.    Gait: antalgic with a right leg limp (reports this is due to hip pain), appropriate coordination and balance.     Neuro:        - Sensation to light touch:    - Intact throughout the BLE including all peripheral nerve distributions.     MSK - Knee:       - Inspection:    - Mild lateral knee joint swelling, no effusion, no surrounding erythema, warmth, ecchymosis, lesion.        - ROM:    - Full AROM/PROM with some anterolateral knee pain with terminal knee flexion.       - Palpation:    - TTP at the lateral joint line.   - NTTP elsewhere.        - Strength:  (*antalgic)   - Knee Flexion  5   - Knee Extension 5         - Special tests:        - Lachman:  Neg        - A/P drawer:  Neg        - Pivot shift:  Neg    - Diony:  Neg     - Varus stress:  Neg for laxity or pain     - Valgus stress:  Neg for laxity or pain    - Patellar grind:  Neg     Radiology  I independently reviewed the available relevant imaging in the chart with my interpretations as above in HPI.     I independently reviewed today's new relevant imaging, with the following interpretation:  - XR R knee 4/8/2025 shows normal anatomic alignment with mild to moderate degenerative changes of the lateral compartment, mild degenerative changes of  the patellofemoral compartment, no acute fractures or dislocations.  No significant knee joint effusion.      Procedure  Large Joint Injection/Arthocentesis: R knee joint    Date/Time: 4/8/2025 11:34 AM    Performed by: Rom Parker DO  Authorized by: Rom Parker DO    Indications:  Pain  Needle Size:  22 G  Guidance: landmark guided    Approach:  Anterolateral  Location:  Knee      Medications:  40 mg triamcinolone 40 MG/ML; 2 mL lidocaine 1 %; 2 mL BUPivacaine 0.5 %  Outcome:  Tolerated well, no immediate complications  Procedure discussed: discussed risks, benefits, and alternatives    Consent Given by:  Patient  Prep: patient was prepped and draped in usual sterile fashion           PROCEDURE  Intraarticular Knee Joint Injection  The patient was informed of the risks and benefits of the procedure and alternatives were discussed. A written consent was signed by the patient.   The injection site was prepped with chlorhexidine in sterile fashion.   An injectate solution containing 2 mL of 1% lidocaine, 2 mL of 0.5% bupivacaine, and 1 mL of Kenalog (40 mg/mL) was drawn up into a 5 mL syringe.  Injection was performed using sterile technique.  A 1.5-inch 22-gauge needle was used to enter the knee joint using a lateral infrapatellar approach and injectate was injected successfully. After the injection, the site was cleaned and a bandage applied. The patient tolerated the procedure well without complications.       Assessment  1. Primary osteoarthritis of right knee        Plan  Oumou Carolina is a pleasant 60 year old female that presents with chronic right knee pain.  She has previously been diagnosed with osteoarthritis of the right knee and has received corticosteroid injections through [facility] orthopedics for years.  These have been very helpful for her and given her 15+ months of relief with each injection.  Her injecting physician recently retired and she is looking to establish care with a new  provider in hopes of continuing relief with injections.  Her radiographs do reveal moderate lateral compartment osteoarthritis and his exam is consistent with primary osteoarthritis of the right knee.  No major concern for meniscal or ligamentous pathology by history or physical exam. History and physical exam appear most consistent with arthritis of the right knee.     We discussed the nature of the condition and available treatment options, and mutually agreed upon the following plan:    - Imaging:          - Reviewed and independently interpreted the relevant imaging in the chart, including any imaging ordered for today's clinic.  - Reviewed results and images with patient.   - MRI available from about 6 years ago from [facility], recommended that she authorize her images to be sent to [facility].  - Medications:          - Discussed pharmacologic options for pain relief.   - May use NSAIDs (Ibuprofen, Naproxen) or Acetaminophen (Tylenol) as needed for pain control.   - Do not take these if previously advised to avoid them for other medical conditions.  - May also use topical medications such as lidocaine, IcyHot, BioFreeze, or Voltaren gel as needed for pain control.    - Voltaren gel is an anti-inflammatory cream that may be used up to 4 times per day over the painful area.   - Injections:          - Discussed possible injection options and alternatives.    - Injection options include: Intra-articular knee joint injection with corticosteroid, viscosupplementation, or PRP.  Corticosteroid has been very helpful for her with no side effects.  - Performed a corticosteroid injection of the right knee joint today in clinic. Patient tolerated the procedure well without complications.     - Post-procedure instructions:    - Keep the injection site clean and dry.   - Do not submerge the injection site for 24 hours (no baths, pools). Showers are ok.   - Rest the area for 24-48 hours before resuming normal activities.  Avoid overexerting the area for the first few weeks.   - It may take 2-3 days to start noticing the effects of the injection and up to 3-4 weeks to feel significant benefits.   - Therapy:          - Discussed the benefits of therapy vs home exercise program for optimization of range of motion, flexibility, strength, stability and function.   - Preference is for a home exercise program.   - Home Exercise Program given today in clinic and recommendation given to perform HEP daily and after exacerbations.  - Modalities:          - May use ice, heat, massage or other modalities as needed.   - Surgery:          - Discussed non-operative and operative treatment options for the patient's condition.   - Goal is to continue conservative care for as long as possible before surgical intervention would need to be considered.  - Activity:          - Encouraged to remain active and participate in regular activities as symptoms allow.   Avoid or modify exacerbating activities as needed.  - Follow up:          - As needed for re-evaluation and update to treatment plan.  - May follow up sooner for new/worsening symptoms.  - May contact clinic by phone or MyChart for questions or concerns.       Rom Parker DO, LANI  [facility] - Sports Medicine  [facility] - Department of Orthopedic Surgery       Disclaimer:  This note was prepared and written using Dragon Medical dictation software. As a result, there may be errors in the script that have gone undetected. Please consider this when interpreting the information in this note.

## 2025-04-08 ENCOUNTER — OFFICE VISIT (OUTPATIENT)
Dept: ORTHOPEDICS | Facility: CLINIC | Age: 60
End: 2025-04-08
Payer: COMMERCIAL

## 2025-04-08 ENCOUNTER — ANCILLARY PROCEDURE (OUTPATIENT)
Dept: GENERAL RADIOLOGY | Facility: CLINIC | Age: 60
End: 2025-04-08
Attending: STUDENT IN AN ORGANIZED HEALTH CARE EDUCATION/TRAINING PROGRAM
Payer: COMMERCIAL

## 2025-04-08 DIAGNOSIS — M17.11 PRIMARY OSTEOARTHRITIS OF RIGHT KNEE: Primary | ICD-10-CM

## 2025-04-08 DIAGNOSIS — M25.561 ACUTE PAIN OF RIGHT KNEE: ICD-10-CM

## 2025-04-08 PROCEDURE — 73564 X-RAY EXAM KNEE 4 OR MORE: CPT | Mod: TC | Performed by: RADIOLOGY

## 2025-04-08 RX ORDER — BUPIVACAINE HYDROCHLORIDE 5 MG/ML
2 INJECTION, SOLUTION PERINEURAL
Status: COMPLETED | OUTPATIENT
Start: 2025-04-08 | End: 2025-04-08

## 2025-04-08 RX ORDER — LIDOCAINE HYDROCHLORIDE 10 MG/ML
2 INJECTION, SOLUTION INFILTRATION; PERINEURAL
Status: COMPLETED | OUTPATIENT
Start: 2025-04-08 | End: 2025-04-08

## 2025-04-08 RX ORDER — TRIAMCINOLONE ACETONIDE 40 MG/ML
40 INJECTION, SUSPENSION INTRA-ARTICULAR; INTRAMUSCULAR
Status: COMPLETED | OUTPATIENT
Start: 2025-04-08 | End: 2025-04-08

## 2025-04-08 RX ADMIN — TRIAMCINOLONE ACETONIDE 40 MG: 40 INJECTION, SUSPENSION INTRA-ARTICULAR; INTRAMUSCULAR at 11:34

## 2025-04-08 RX ADMIN — LIDOCAINE HYDROCHLORIDE 2 ML: 10 INJECTION, SOLUTION INFILTRATION; PERINEURAL at 11:34

## 2025-04-08 RX ADMIN — BUPIVACAINE HYDROCHLORIDE 2 ML: 5 INJECTION, SOLUTION PERINEURAL at 11:34

## 2025-04-08 NOTE — LETTER
2025      Oumou Carolina  [address] [place] [address]      Dear Colleague,    Thank you for referring your patient, Oumou Carolina, to the [facility]. Please see a copy of my visit note below.    Oumou Carolina  :  1965  DOS: 2025  MRN: [id number]  PCP: Florinda Eaton    Sports Medicine Clinic Visit    HPI  Oumou Carolina is a 60 year old female who is seen as a self referral presenting with right knee pain.    - Mechanism of Injury:    -  Was a frequent runner over 10 years ago. Pain since then. No acute injury onset  - Pertinent history and prior evaluations:    -  Had been getting injections through [facility] but doctor has retired , very helpful for greater than 1 year of relief with each injection.      - MRI R knee  shows small contusions in the medial femoral condyle and medial tibial plateau, no meniscal or ligamentous injury.   - XR R knee  shows mild tricompartmental osteoarthritis.    - Pain Character:    - Location:  Right anterolateral knee  - Character:  aching  - Duration:  10 years  - Course:  waxing and waning  - Endorses:    -  pain and popping , achy toothache pain in the lateral knee and that hurts worse with weightbearing activities, slight swelling at the lateral joint line  - Denies:    - grinding, numbness, tingling, instability, mechanical locking symptoms, diffuse knee swelling  - Alleviating factors:    -  corticosteroid injection , rest, activity modifications  - Aggravating factors:    -  general knee discomfort with weightbearing activities  - Other treatments tried:    - corticosteroid injections with [facility]. Last one about a year and a half ago and lasted about 15 months. Ibuprofen bothers her stomach.    - Patient Goals:    - get a formal diagnosis, discuss treatment options  - Social History:   -  Employed. Desk work      Review of Systems  Musculoskeletal: as above  Remainder of review of systems is negative  including constitutional, CV, pulmonary, GI, Skin and Neurologic except as noted in HPI or medical history.    Past Medical History:   Diagnosis Date     Allergy, unspecified not elsewhere classified 2003    s/p desensitization     Anxiety      Atrial tachycardia     ablation 3/24/11     Bartholin's abscess 7/09     Basal cell carcinoma of anterior chest s/p excision 11-4-14 11/4/2014     Bradycardia      Excessive or frequent menstruation 2001    s/p TVH     Transient disorder of initiating or maintaining sleep 2005     Past Surgical History:   Procedure Laterality Date     ABDOMEN SURGERY  2001    Hysterectomy     ARTHROPLASTY TOE(S)  12/02/2013    Procedure: ARTHROPLASTY TOE(S);;  Surgeon: Vinayak Palacios DPM;  Location: [facility] OR     BIOPSY  2014, 2016, 2017    skin cancer biopsies, multiple     BUNIONECTOMY  12/02/2013    Procedure: BUNIONECTOMY;  Bunionectomy, resection of arthroplasty of PIP joint right foot;  Surgeon: Vinayak Palacios DPM;  Location: [facility] OR     CARDIAC SURGERY  ablation for abnormal rhythym    2013     COSMETIC SURGERY  abdomiplasty, lipo    6/2016, 4/2017     EYE SURGERY  lasik    11/2007     [facility] REVISE MEDIAN N/CARPAL TUNNEL SURG      bilateral carpal tunnel repair     HYSTERECTOMY, PAP NO LONGER INDICATED  2001     HYSTERECTOMY, PAP NO LONGER INDICATED  2001     HYSTERECTOMY, VAGINAL  01/30/2001    TVH for menorraghia     FL SKIN TAG REMOVAL 1 TO 8 LESIONS PER LESION  2/10/2025     REPAIR HAMMER TOE  12/19/2011    Procedure:REPAIR HAMMER TOE; Arthroplasty PIPJ 5th toe, left; Surgeon:VINAYAK PALACIOS; Location:[facility] OR     SURGICAL HISTORY OF -   08/2009    Bilat LE Laser ablation, varicose veins     SURGICAL HISTORY OF -   03/24/2011    atrial ablation     ZZC LIGATE FALLOPIAN TUBE       Family History   Problem Relation Age of Onset     Obesity Father      Cancer Mother         Skin cancer on lip     Other Cancer Mother         including melonoma     Other Cancer Maternal Grandmother       Other Cancer Paternal Grandfather         Pancreatic cancer     Hypertension No family hx of      Coronary Artery Disease No family hx of      Diabetes No family hx of      Hyperlipidemia No family hx of      Breast Cancer No family hx of      Cancer - colorectal No family hx of      Depression/Anxiety No family hx of      Cerebrovascular Disease No family hx of      Thyroid Disease No family hx of      Asthma No family hx of      Chemical Addiction No family hx of      Known Genetic Syndrome No family hx of      Osteoporosis No family hx of      Anesthesia Reaction No family hx of      Prostate Cancer No family hx of      Ovarian Cancer No family hx of          Objective  LMP  (LMP Unknown)     General: healthy, alert and in no acute distress.    HEENT: no scleral icterus or conjunctival erythema.   Skin: no suspicious lesions or rash. No jaundice.   CV: regular rhythm by palpation, 2+ distal pulses.  Resp: normal respiratory effort without conversational dyspnea.   Psych: normal mood and affect.    Gait: antalgic with a right leg limp (reports this is due to hip pain), appropriate coordination and balance.     Neuro:        - Sensation to light touch:    - Intact throughout the BLE including all peripheral nerve distributions.     MSK - Knee:       - Inspection:    - Mild lateral knee joint swelling, no effusion, no surrounding erythema, warmth, ecchymosis, lesion.        - ROM:    - Full AROM/PROM with some anterolateral knee pain with terminal knee flexion.       - Palpation:    - TTP at the lateral joint line.   - NTTP elsewhere.        - Strength:  (*antalgic)   - Knee Flexion  5   - Knee Extension 5         - Special tests:        - Lachman:  Neg        - A/P drawer:  Neg        - Pivot shift:  Neg    - Diony:  Neg     - Varus stress:  Neg for laxity or pain     - Valgus stress:  Neg for laxity or pain    - Patellar grind:  Neg     Radiology  I independently reviewed the available relevant imaging in the  chart with my interpretations as above in HPI.     I independently reviewed today's new relevant imaging, with the following interpretation:  - XR R knee 4/8/2025 shows normal anatomic alignment with mild to moderate degenerative changes of the lateral compartment, mild degenerative changes of the patellofemoral compartment, no acute fractures or dislocations.  No significant knee joint effusion.      Procedure  Large Joint Injection/Arthocentesis: R knee joint    Date/Time: 4/8/2025 11:34 AM    Performed by: Rom Parker DO  Authorized by: Rom Parker DO    Indications:  Pain  Needle Size:  22 G  Guidance: landmark guided    Approach:  Anterolateral  Location:  Knee      Medications:  40 mg triamcinolone 40 MG/ML; 2 mL lidocaine 1 %; 2 mL BUPivacaine 0.5 %  Outcome:  Tolerated well, no immediate complications  Procedure discussed: discussed risks, benefits, and alternatives    Consent Given by:  Patient  Prep: patient was prepped and draped in usual sterile fashion           PROCEDURE  Intraarticular Knee Joint Injection  The patient was informed of the risks and benefits of the procedure and alternatives were discussed. A written consent was signed by the patient.   The injection site was prepped with chlorhexidine in sterile fashion.   An injectate solution containing 2 mL of 1% lidocaine, 2 mL of 0.5% bupivacaine, and 1 mL of Kenalog (40 mg/mL) was drawn up into a 5 mL syringe.  Injection was performed using sterile technique.  A 1.5-inch 22-gauge needle was used to enter the knee joint using a lateral infrapatellar approach and injectate was injected successfully. After the injection, the site was cleaned and a bandage applied. The patient tolerated the procedure well without complications.       Assessment  1. Primary osteoarthritis of right knee        Plan  Oumou Carolina is a pleasant 60 year old female that presents with chronic right knee pain.  She has previously been diagnosed with  osteoarthritis of the right knee and has received corticosteroid injections through [facility] orthopedics for years.  These have been very helpful for her and given her 15+ months of relief with each injection.  Her injecting physician recently retired and she is looking to establish care with a new provider in hopes of continuing relief with injections.  Her radiographs do reveal moderate lateral compartment osteoarthritis and his exam is consistent with primary osteoarthritis of the right knee.  No major concern for meniscal or ligamentous pathology by history or physical exam. History and physical exam appear most consistent with arthritis of the right knee.     We discussed the nature of the condition and available treatment options, and mutually agreed upon the following plan:    - Imaging:          - Reviewed and independently interpreted the relevant imaging in the chart, including any imaging ordered for today's clinic.  - Reviewed results and images with patient.   - MRI available from about 6 years ago from [facility], recommended that she authorize her images to be sent to [facility].  - Medications:          - Discussed pharmacologic options for pain relief.   - May use NSAIDs (Ibuprofen, Naproxen) or Acetaminophen (Tylenol) as needed for pain control.   - Do not take these if previously advised to avoid them for other medical conditions.  - May also use topical medications such as lidocaine, IcyHot, BioFreeze, or Voltaren gel as needed for pain control.    - Voltaren gel is an anti-inflammatory cream that may be used up to 4 times per day over the painful area.   - Injections:          - Discussed possible injection options and alternatives.    - Injection options include: Intra-articular knee joint injection with corticosteroid, viscosupplementation, or PRP.  Corticosteroid has been very helpful for her with no side effects.  - Performed a corticosteroid injection of the right knee joint today in  clinic. Patient tolerated the procedure well without complications.     - Post-procedure instructions:    - Keep the injection site clean and dry.   - Do not submerge the injection site for 24 hours (no baths, pools). Showers are ok.   - Rest the area for 24-48 hours before resuming normal activities. Avoid overexerting the area for the first few weeks.   - It may take 2-3 days to start noticing the effects of the injection and up to 3-4 weeks to feel significant benefits.   - Therapy:          - Discussed the benefits of therapy vs home exercise program for optimization of range of motion, flexibility, strength, stability and function.   - Preference is for a home exercise program.   - Home Exercise Program given today in clinic and recommendation given to perform HEP daily and after exacerbations.  - Modalities:          - May use ice, heat, massage or other modalities as needed.   - Surgery:          - Discussed non-operative and operative treatment options for the patient's condition.   - Goal is to continue conservative care for as long as possible before surgical intervention would need to be considered.  - Activity:          - Encouraged to remain active and participate in regular activities as symptoms allow.   Avoid or modify exacerbating activities as needed.  - Follow up:          - As needed for re-evaluation and update to treatment plan.  - May follow up sooner for new/worsening symptoms.  - May contact clinic by phone or MyChart for questions or concerns.       Rom Parker DO, LANI  [facility] - Sports Medicine  [facility] - Department of Orthopedic Surgery       Disclaimer:  This note was prepared and written using Dragon Medical dictation software. As a result, there may be errors in the script that have gone undetected. Please consider this when interpreting the information in this note.        Again, thank you for allowing me to participate in the care of your  patient.        Sincerely,        Rom Parker, DO    Electronically signed

## 2025-04-10 DIAGNOSIS — F33.1 MODERATE RECURRENT MAJOR DEPRESSION (H): ICD-10-CM

## 2025-04-10 RX ORDER — FLUOXETINE HYDROCHLORIDE 40 MG/1
40 CAPSULE ORAL DAILY
Qty: 90 CAPSULE | Refills: 0 | Status: SHIPPED | OUTPATIENT
Start: 2025-04-10

## 2025-04-21 ENCOUNTER — OFFICE VISIT (OUTPATIENT)
Dept: ORTHOPEDICS | Facility: CLINIC | Age: 60
End: 2025-04-21
Payer: COMMERCIAL

## 2025-04-21 VITALS — BODY MASS INDEX: 36.82 KG/M2 | WEIGHT: 221 LBS | HEIGHT: 65 IN | TEMPERATURE: 98.3 F

## 2025-04-21 DIAGNOSIS — M16.11 PRIMARY OSTEOARTHRITIS OF RIGHT HIP: Primary | ICD-10-CM

## 2025-04-21 DIAGNOSIS — S73.191D TEAR OF RIGHT ACETABULAR LABRUM, SUBSEQUENT ENCOUNTER: ICD-10-CM

## 2025-04-21 DIAGNOSIS — M25.851 RIGHT HIP IMPINGEMENT SYNDROME: ICD-10-CM

## 2025-04-21 PROCEDURE — 99214 OFFICE O/P EST MOD 30 MIN: CPT | Performed by: PHYSICIAN ASSISTANT

## 2025-04-21 PROCEDURE — 1125F AMNT PAIN NOTED PAIN PRSNT: CPT | Performed by: PHYSICIAN ASSISTANT

## 2025-04-21 ASSESSMENT — PAIN SCALES - GENERAL: PAINLEVEL_OUTOF10: SEVERE PAIN (7)

## 2025-04-21 NOTE — LETTER
4/21/2025      Oumou Carolina  [address] [facility] [address]      Dear Colleague,    Thank you for referring your patient, Oumou Carolina, to the [facility]. Please see a copy of my visit note below.    Office Visit-Follow up    Chief Complaint: Oumou Carolina is a 60 year old female who is being seen for   Chief Complaint   Patient presents with     RECHECK     1.  Right Hip Primary Osteoarthritis, mild  2.  Right hip impingement            History of Present Illness:   Mechanism of Injury: No injury fall or trauma That she can remember other than around the last week of October she was on a sticky mat and felt a pop in her hip when she made a step but is unsure if this is really the cause of the pain.   Location: Right groin pain  Duration of Pain: Since October so about 6 months  Rating of Pain: 7 out of 10  Pain Quality: Can be achy but also can be very sharp and stabbing  Pain is better with: Rest  Pain is worse with: Certain movements  Treatment so far consists of: Patient was last seen by myself on 2/24/2025 and at that visit we discussed the osteoarthritis of her hip and also impingement possibility.  She had already gotten a steroid injection with Dr. Parker ultrasound-guided into the right hip joint.  This injection lasted 2 weeks but gave her 100% of relief at that time.  The injection with Dr. Parker was done on 1/28/2025.  She does not like taking pills and thus oral anti-inflammatory makes it difficult.  She has not done physical therapy but we did order an osteoarthritic program for her at that time.  I do not suspect any labral tears at that time either.  This is why we did not do an MRI arthrogram.  She could have another injection in 3 months aside from that she could talk to one of the surgeons about surgery on the hip in the form of a total joint replacement possibly.  Patient feels that the therapy did help a little bit but overall it has not helped and she  "is doing her home exercise program.  She feels she is getting some catching and locking and snapping and now it is getting worse and she cannot even carry a laundry basket..   Associated Features: Denies shooting burning or electric pain.  Denies any radicular pain.  Denies any back pain.  Denies any lateral hip pain.  Pain is Limiting: Comfortable ambulation  Here to: Orthopedic follow-up  Additional History: Patient would like to get an MRI to get further imaging to find out what is going wrong inside that hip.    REVIEW OF SYSTEMS  Review of systems negative other than positive findings in HPI.    Physical Exam:  Vitals: Temp 98.3  F (36.8  C) (Temporal)   Ht 1.657 m (5' 5.25\")   Wt 100.2 kg (221 lb)   LMP  (LMP Unknown)   BMI 36.50 kg/m    BMI= Body mass index is 36.5 kg/m .  Constitutional: healthy, alert and no acute distress   Psychiatric: mentation appears normal and affect normal/bright  NEURO: no focal deficits, CMS intact right lower extremity   RESP: Normal with easy respirations and no use of accessory muscles to breathe, no audible wheezing or retractions  CV: Calf soft and nontender to palpation, leg warm   SKIN: No erythema, rashes, excoriation, or breakdown. No evidence of infection, of the skin I see today, I did not have her change in shorts.  MUSCULOSKELETAL:  INSPECTION of right hip: No gross deformities  PALPATION: no tenderness over the lateral hip and greater trochanteric region, thigh or lower leg.   ROM: Passive: Flexion to 130 degrees, internal rotation to 25 degrees, external rotation to 70 degrees.  All range of motion without catching locking but there is pain with internal and external range of motion in the groin  STRENGTH: 5 out of 5 hip flexion, quad and hamstring without pain except with hip flexion and quad testing there is pain in the groin  SPECIAL TEST: Negative Andrade's maneuver, positive FADIR maneuver, positive scour maneuver, did not do Edilma's test.   SPECIAL TEST " SPINE: Patient has no tenderness on palpation of cervical/thoracic/lumbar spine or SI joints. Negative straight leg raise to 90 . Negative Andrade's maneuver.   GAIT: non-antalgic  Lymph: no palpable lymph nodes      Diagnostic Modalities:  I reviewed x-rays that were done on 1/6/2025 showing mild joint space narrowing seen in the inferior portion of the acetabulum as well as a pincer deformity on the lateral side of the acetabulum.  No fracture no dislocation no tumor.  No cam deformity noted.  Alignment appropriate.     Independent visualization of the images was performed.      Impression: 1.  Right Hip Primary Osteoarthritis, mild  2.  Right hip impingement    Plan:  All of the above pertinent physical exam and imaging modalities findings was reviewed with Oumou.      FOCUSED PLAN:   Patient did do formal physical therapy for an osteoarthritis program but it did not seem to help her.  Her pain seems to be sharp at times and feels she is getting some catching and locking.  Steroid injection done in the hip joint on 1/28/2025 by Dr. Parker gave 100% relief for about 2 weeks.  Patient has normal back symptoms or radicular symptoms and she really would like to do further imaging.  It is possible this could be a labral tear but also possible osteoarthritis but we will get an MRI arthrogram and review the imaging virtually with her in follow-up.  The other option is to get a second opinion with Dr. Parker who she knows well but we will proceed with the MRI arthrogram.  Follow-up virtually after the MRI arthrogram is done.    Re-x-ray on return: No      This note was dictated with [employer].    Shaheen Billings PA-C                Again, thank you for allowing me to participate in the care of your patient.        Sincerely,        Shaheen Billings PA-C    Electronically signed

## 2025-04-21 NOTE — PROGRESS NOTES
Office Visit-Follow up    Chief Complaint: Oumou Carolina is a 60 year old female who is being seen for   Chief Complaint   Patient presents with    RECHECK     1.  Right Hip Primary Osteoarthritis, mild  2.  Right hip impingement            History of Present Illness:   Mechanism of Injury: No injury fall or trauma That she can remember other than around the last week of October she was on a sticky mat and felt a pop in her hip when she made a step but is unsure if this is really the cause of the pain.   Location: Right groin pain  Duration of Pain: Since October so about 6 months  Rating of Pain: 7 out of 10  Pain Quality: Can be achy but also can be very sharp and stabbing  Pain is better with: Rest  Pain is worse with: Certain movements  Treatment so far consists of: Patient was last seen by myself on 2/24/2025 and at that visit we discussed the osteoarthritis of her hip and also impingement possibility.  She had already gotten a steroid injection with Dr. Parker ultrasound-guided into the right hip joint.  This injection lasted 2 weeks but gave her 100% of relief at that time.  The injection with Dr. Parker was done on 1/28/2025.  She does not like taking pills and thus oral anti-inflammatory makes it difficult.  She has not done physical therapy but we did order an osteoarthritic program for her at that time.  I do not suspect any labral tears at that time either.  This is why we did not do an MRI arthrogram.  She could have another injection in 3 months aside from that she could talk to one of the surgeons about surgery on the hip in the form of a total joint replacement possibly.  Patient feels that the therapy did help a little bit but overall it has not helped and she is doing her home exercise program.  She feels she is getting some catching and locking and snapping and now it is getting worse and she cannot even carry a laundry basket..   Associated Features: Denies shooting burning or electric  "pain.  Denies any radicular pain.  Denies any back pain.  Denies any lateral hip pain.  Pain is Limiting: Comfortable ambulation  Here to: Orthopedic follow-up  Additional History: Patient would like to get an MRI to get further imaging to find out what is going wrong inside that hip.    REVIEW OF SYSTEMS  Review of systems negative other than positive findings in HPI.    Physical Exam:  Vitals: Temp 98.3  F (36.8  C) (Temporal)   Ht 1.657 m (5' 5.25\")   Wt 100.2 kg (221 lb)   LMP  (LMP Unknown)   BMI 36.50 kg/m    BMI= Body mass index is 36.5 kg/m .  Constitutional: healthy, alert and no acute distress   Psychiatric: mentation appears normal and affect normal/bright  NEURO: no focal deficits, CMS intact right lower extremity   RESP: Normal with easy respirations and no use of accessory muscles to breathe, no audible wheezing or retractions  CV: Calf soft and nontender to palpation, leg warm   SKIN: No erythema, rashes, excoriation, or breakdown. No evidence of infection, of the skin I see today, I did not have her change in shorts.  MUSCULOSKELETAL:  INSPECTION of right hip: No gross deformities  PALPATION: no tenderness over the lateral hip and greater trochanteric region, thigh or lower leg.   ROM: Passive: Flexion to 130 degrees, internal rotation to 25 degrees, external rotation to 70 degrees.  All range of motion without catching locking but there is pain with internal and external range of motion in the groin  STRENGTH: 5 out of 5 hip flexion, quad and hamstring without pain except with hip flexion and quad testing there is pain in the groin  SPECIAL TEST: Negative Andrade's maneuver, positive FADIR maneuver, positive scour maneuver, did not do Edilma's test.   SPECIAL TEST SPINE: Patient has no tenderness on palpation of cervical/thoracic/lumbar spine or SI joints. Negative straight leg raise to 90 . Negative Andrade's maneuver.   GAIT: non-antalgic  Lymph: no palpable lymph nodes      Diagnostic " Modalities:  I reviewed x-rays that were done on 1/6/2025 showing mild joint space narrowing seen in the inferior portion of the acetabulum as well as a pincer deformity on the lateral side of the acetabulum.  No fracture no dislocation no tumor.  No cam deformity noted.  Alignment appropriate.     Independent visualization of the images was performed.      Impression: 1.  Right Hip Primary Osteoarthritis, mild  2.  Right hip impingement    Plan:  All of the above pertinent physical exam and imaging modalities findings was reviewed with Oumou.      FOCUSED PLAN:   Patient did do formal physical therapy for an osteoarthritis program but it did not seem to help her.  Her pain seems to be sharp at times and feels she is getting some catching and locking.  Steroid injection done in the hip joint on 1/28/2025 by Dr. Parker gave 100% relief for about 2 weeks.  Patient has normal back symptoms or radicular symptoms and she really would like to do further imaging.  It is possible this could be a labral tear but also possible osteoarthritis but we will get an MRI arthrogram and review the imaging virtually with her in follow-up.  The other option is to get a second opinion with Dr. Parker who she knows well but we will proceed with the MRI arthrogram.  Follow-up virtually after the MRI arthrogram is done.    Re-x-ray on return: No      This note was dictated with [employer].    Shaheen Billings PA-C

## 2025-04-29 ENCOUNTER — HOSPITAL ENCOUNTER (OUTPATIENT)
Dept: GENERAL RADIOLOGY | Facility: CLINIC | Age: 60
Discharge: HOME | End: 2025-04-29
Attending: PHYSICIAN ASSISTANT
Payer: COMMERCIAL

## 2025-04-29 ENCOUNTER — HOSPITAL ENCOUNTER (OUTPATIENT)
Dept: MRI IMAGING | Facility: CLINIC | Age: 60
Discharge: HOME | End: 2025-04-29
Attending: PHYSICIAN ASSISTANT
Payer: COMMERCIAL

## 2025-04-29 DIAGNOSIS — S73.191D TEAR OF RIGHT ACETABULAR LABRUM, SUBSEQUENT ENCOUNTER: ICD-10-CM

## 2025-04-29 PROCEDURE — 250N000009 HC RX 250: Performed by: INTERNAL MEDICINE

## 2025-04-29 PROCEDURE — 255N000002 HC RX 255 OP 636: Performed by: INTERNAL MEDICINE

## 2025-04-29 PROCEDURE — 96372 THER/PROPH/DIAG INJ SC/IM: CPT | Performed by: INTERNAL MEDICINE

## 2025-04-29 PROCEDURE — 250N000011 HC RX IP 250 OP 636: Performed by: INTERNAL MEDICINE

## 2025-04-29 PROCEDURE — 73722 MRI JOINT OF LWR EXTR W/DYE: CPT | Mod: RT

## 2025-04-29 PROCEDURE — A9585 GADOBUTROL INJECTION: HCPCS | Performed by: INTERNAL MEDICINE

## 2025-04-29 PROCEDURE — 77002 NEEDLE LOCALIZATION BY XRAY: CPT

## 2025-04-29 RX ORDER — IOPAMIDOL 510 MG/ML
20 INJECTION, SOLUTION INTRAVASCULAR ONCE
Status: COMPLETED | OUTPATIENT
Start: 2025-04-29 | End: 2025-04-29

## 2025-04-29 RX ORDER — LIDOCAINE HYDROCHLORIDE 10 MG/ML
5 INJECTION, SOLUTION EPIDURAL; INFILTRATION; INTRACAUDAL; PERINEURAL ONCE
Status: COMPLETED | OUTPATIENT
Start: 2025-04-29 | End: 2025-04-29

## 2025-04-29 RX ORDER — BUPIVACAINE HYDROCHLORIDE 2.5 MG/ML
7 INJECTION, SOLUTION EPIDURAL; INFILTRATION; INTRACAUDAL; PERINEURAL ONCE
Status: COMPLETED | OUTPATIENT
Start: 2025-04-29 | End: 2025-04-29

## 2025-04-29 RX ORDER — GADOBUTROL 604.72 MG/ML
0.05 INJECTION INTRAVENOUS ONCE
Status: COMPLETED | OUTPATIENT
Start: 2025-04-29 | End: 2025-04-29

## 2025-04-29 RX ADMIN — LIDOCAINE HYDROCHLORIDE 5 ML: 10 INJECTION, SOLUTION EPIDURAL; INFILTRATION; INTRACAUDAL; PERINEURAL at 09:33

## 2025-04-29 RX ADMIN — BUPIVACAINE HYDROCHLORIDE 7 ML: 2.5 INJECTION, SOLUTION EPIDURAL; INFILTRATION; INTRACAUDAL; PERINEURAL at 09:37

## 2025-04-29 RX ADMIN — GADOBUTROL 0.05 ML: 604.72 INJECTION INTRAVENOUS at 09:37

## 2025-04-29 RX ADMIN — IOPAMIDOL 20 ML: 510 INJECTION, SOLUTION INTRAVASCULAR at 09:36

## 2025-04-29 RX ADMIN — EPINEPHRINE 0.1 MG: 1 INJECTION INTRAMUSCULAR; INTRAVENOUS; SUBCUTANEOUS at 09:37

## 2025-05-05 ENCOUNTER — VIRTUAL VISIT (OUTPATIENT)
Dept: ORTHOPEDICS | Facility: CLINIC | Age: 60
End: 2025-05-05
Payer: COMMERCIAL

## 2025-05-05 DIAGNOSIS — M94.251 CHONDROMALACIA OF RIGHT HIP: ICD-10-CM

## 2025-05-05 DIAGNOSIS — S73.191D TEAR OF RIGHT ACETABULAR LABRUM, SUBSEQUENT ENCOUNTER: Primary | ICD-10-CM

## 2025-05-05 PROCEDURE — 98006 SYNCH AUDIO-VIDEO EST MOD 30: CPT | Performed by: PHYSICIAN ASSISTANT

## 2025-05-05 NOTE — PROGRESS NOTES
"Oumou Carolina is a 60 year old female who is being evaluated via a billable video visit.      The patient has been notified of following:     \"This video visit will be conducted via a call between you and your physician/provider. We have found that certain health care needs can be provided without the need for a physical exam.  This service lets us provide the care you need with a short video conversation.  If a prescription is necessary we can send it directly to your pharmacy.  If lab work is needed we can place an order for that and you can then stop by our lab to have the test done at a later time.    If during the course of the call the physician/provider feels a video visit is not appropriate, you will not be charged for this service.\"     Patient has given verbal consent for Video visit?  Yes    I have reviewed and updated the patient's Past Medical History, Social History, Family History and Medication List.    ALLERGIES  No known drug allergy    Subjective:  Oumou Carolina complains of    Chief Complaint   Patient presents with    Video Visit     MR results       HPI:  Patient was last seen on 4/21/2025 by myself and at that time she had already done formal therapy for osteoarthritis program which did not seem to help her and she did get a steroid injection into the hip joint on 1/28/2025 by Dr. Parker which took 100% of the pain away for just 2 weeks.  Patient has been complaining of catching and locking so we decided to proceed with an MRI arthrogram to check the integrity of the labrum.  We did offer another injection with Dr. Parker as an alternative treatment but we decided on the MRI arthrogram.  Patient states that since the arthrogram the pain has gotten slightly worse.  She has difficulty walking more than a block.  She notices catching and locking still and deep growing pain and more pain with rotation and does notice that the pain is sharp at times.      Objective:    Vitals:  No " vitals were obtained today due to virtual visit.    (All of the exam as per the patient's report other than inspection or otherwise stated)    INSPECTION of right hip: No gross deformities  PALPATION: no tenderness over the lateral hip and greater trochanteric region, thigh or lower leg.    ROM: Patient states decreased range of motion but no different than previous exam.  STRENGTH: Feels weak secondary to pain per patient.   SPECIAL TEST:   None today  Diagnostic Modalities:  Recent Results (from the past 744 hours)   XR Knee Right G/E 4 Views    Narrative    EXAM: XR KNEE RIGHT G/E 4 VIEWS  [facility]  DATE: 4/8/2025    INDICATION:  Acute pain of right knee  COMPARISON: None.      Impression    IMPRESSION: Both knees negative for fracture or compartmental narrowing. No effusion. Slight osteophytic spurring along lateral joint line bilaterally.   XR Hip CT/MR Contrast Injection Right    Narrative    PROCEDURE PERFORMED:  1. FLUOROSCOPIC-GUIDED INTRAARTICULAR GADOLINIUM INJECTION FOR DIAGNOSTIC PURPOSES.  2. INJECTION OF CONTRAST FOR ARTHROGRAM.  LOCATION: [facility]  DATE: 4/29/2025    INDICATION:  Tear of right acetabular labrum, subsequent encounter  COMPARISON: None.    RADIATION DOSE: Dose Area Product 26.49 microGy-m2    PROCEDURE: Risk and benefits of the procedure including, but not limited to, infection and bleeding, were discussed with the patient. The patient was prepped and draped in the usual sterile fashion. The skin overlying the joint was anesthetized with   approximately 5 mL of 1% lidocaine. Under fluoroscopic guidance, a 22 needle was advanced into the joint. Position of the needle was confirmed with the use of approximately 2 mL Isovue-200 contrast. 14 mL of a mixture of 0.1 mL of Magnevist gadolinium   mixed with 20 mL of sterile saline was then instilled into the joint. Patient was then transported to MRI for imaging. Patient  tolerated the procedure well.      Impression    IMPRESSION:  1. Successful intraarticular gadolinium injection into the right hip under fluoroscopic guidance.    Reference CPT Codes: 27960, 78009   MR HIP ARTHROGRAM RIGHT W CONTRAST    Narrative    EXAM: MR HIP ARTHROGRAM RIGHT W CONTRAST  LOCATION: [facility]  DATE: 4/29/2025    INDICATION: Tear of right acetabular labrum, subsequent encounter.  COMPARISON: Radiographs from 01/06/2025.  TECHNIQUE: MR arthrogram protocol, obtained after administration of dilute gadolinium solution into the hip joint.    FINDINGS:  RIGHT HIP:  -Labrum: Extensive tear throughout the anterior and posterior labrum. Moderate-sized paralabral cyst.   -Cartilage: Moderate amount of full-thickness chondromalacia of the superior aspect of the femoral head and acetabulum.  -Joint space: [Good distention of the joint with contrast. No intra-articular body.  -Joint capsule/ligaments: Intact joint capsule. Ligamentum teres is intact.   -Morphology: No abnormal underlying hip morphology.    MUSCLES AND TENDONS:   -Gluteal: No tendon tear or tendinopathy. No trochanteric bursitis.  -Proximal hamstring: No tendon tear or tendinopathy.   -Iliopsoas: No tendon tear or tendinopathy. No bursitis.  -Rectus femoris origin: No tear or tendinopathy.    BONES:   -Moderate marrow edema in the superior aspect of the femoral head and acetabulum related to overlying full-thickness chondromalacia or stress reaction. There is no fracture.    SOFT TISSUES:  -Normal muscle bulk. No acute muscular injury.    INTRAPELVIC CONTENTS:   -Visualized portions are normal.      Impression    IMPRESSION:  1.  Extensive right acetabular labral tear with a moderate-sized paralabral cyst.  2.  Moderate amount of full-thickness chondromalacia in the superior aspect of the femoral head and acetabulum.  3.  Marrow edema in the superior aspect of the femoral head and acetabulum related to overlying  full-thickness chondromalacia or stress reaction. No fracture.       I agree with the above reading.    Independent visualization of the images was performed.    Assessment:  1.  Right hip labral tear  2.  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  Discussed MRI results.  2.  Pain could be coming from labral tear versus chondromalacia.  3.  Since patient continues to get worse and has tried therapy as well as a steroid injection I recommended for total referral to Dr. Andrade Dowell.  4.  Discussed that Dr. Dowell works with labral tears which she has and also will take into consideration the chondromalacia.  5.  Ortho  [place]  6.  Follow-up with me as needed.      Virtual Visit Details    Type of service:  Video Visit     Originating Location (pt. Location): Home    Distant Location (provider location):  On-site  Platform used for Video Visit: Mariaelena         This note was dictated with [employer].    Shaheen Billings PA-C

## 2025-05-05 NOTE — LETTER
"5/5/2025      Oumou Carolina  [address] [facility] [address]      Dear Colleague,    Thank you for referring your patient, Oumou Carolina, to the [facility]. Please see a copy of my visit note below.    Oumou Carolina is a 60 year old female who is being evaluated via a billable video visit.      The patient has been notified of following:     \"This video visit will be conducted via a call between you and your physician/provider. We have found that certain health care needs can be provided without the need for a physical exam.  This service lets us provide the care you need with a short video conversation.  If a prescription is necessary we can send it directly to your pharmacy.  If lab work is needed we can place an order for that and you can then stop by our lab to have the test done at a later time.    If during the course of the call the physician/provider feels a video visit is not appropriate, you will not be charged for this service.\"     Patient has given verbal consent for Video visit?  Yes    I have reviewed and updated the patient's Past Medical History, Social History, Family History and Medication List.    ALLERGIES  No known drug allergy    Subjective:  Oumou Carolina complains of    Chief Complaint   Patient presents with     Video Visit     MR results       HPI:  Patient was last seen on 4/21/2025 by myself and at that time she had already done formal therapy for osteoarthritis program which did not seem to help her and she did get a steroid injection into the hip joint on 1/28/2025 by Dr. Parker which took 100% of the pain away for just 2 weeks.  Patient has been complaining of catching and locking so we decided to proceed with an MRI arthrogram to check the integrity of the labrum.  We did offer another injection with Dr. Parker as an alternative treatment but we decided on the MRI arthrogram.  Patient states that since the arthrogram the pain has gotten " slightly worse.  She has difficulty walking more than a block.  She notices catching and locking still and deep growing pain and more pain with rotation and does notice that the pain is sharp at times.      Objective:    Vitals:  No vitals were obtained today due to virtual visit.    (All of the exam as per the patient's report other than inspection or otherwise stated)    INSPECTION of right hip: No gross deformities  PALPATION: no tenderness over the lateral hip and greater trochanteric region, thigh or lower leg.    ROM: Patient states decreased range of motion but no different than previous exam.  STRENGTH: Feels weak secondary to pain per patient.   SPECIAL TEST:   None today  Diagnostic Modalities:  Recent Results (from the past 744 hours)   XR Knee Right G/E 4 Views    Narrative    EXAM: XR KNEE RIGHT G/E 4 VIEWS  LOCATION: [facility]  DATE: 4/8/2025    INDICATION:  Acute pain of right knee  COMPARISON: None.      Impression    IMPRESSION: Both knees negative for fracture or compartmental narrowing. No effusion. Slight osteophytic spurring along lateral joint line bilaterally.   XR Hip CT/MR Contrast Injection Right    Narrative    PROCEDURE PERFORMED:  1. FLUOROSCOPIC-GUIDED INTRAARTICULAR GADOLINIUM INJECTION FOR DIAGNOSTIC PURPOSES.  2. INJECTION OF CONTRAST FOR ARTHROGRAM.  LOCATION: [facility]  DATE: 4/29/2025    INDICATION:  Tear of right acetabular labrum, subsequent encounter  COMPARISON: None.    RADIATION DOSE: Dose Area Product 26.49 microGy-m2    PROCEDURE: Risk and benefits of the procedure including, but not limited to, infection and bleeding, were discussed with the patient. The patient was prepped and draped in the usual sterile fashion. The skin overlying the joint was anesthetized with   approximately 5 mL of 1% lidocaine. Under fluoroscopic guidance, a 22 needle was advanced into the joint. Position of the needle was confirmed with the  use of approximately 2 mL Isovue-200 contrast. 14 mL of a mixture of 0.1 mL of Magnevist gadolinium   mixed with 20 mL of sterile saline was then instilled into the joint. Patient was then transported to MRI for imaging. Patient tolerated the procedure well.      Impression    IMPRESSION:  1. Successful intraarticular gadolinium injection into the right hip under fluoroscopic guidance.    Reference CPT Codes: 20233, 26805   MR HIP ARTHROGRAM RIGHT W CONTRAST    Narrative    EXAM: MR HIP ARTHROGRAM RIGHT W CONTRAST  LOCATION: [facility]  DATE: 4/29/2025    INDICATION: Tear of right acetabular labrum, subsequent encounter.  COMPARISON: Radiographs from 01/06/2025.  TECHNIQUE: MR arthrogram protocol, obtained after administration of dilute gadolinium solution into the hip joint.    FINDINGS:  RIGHT HIP:  -Labrum: Extensive tear throughout the anterior and posterior labrum. Moderate-sized paralabral cyst.   -Cartilage: Moderate amount of full-thickness chondromalacia of the superior aspect of the femoral head and acetabulum.  -Joint space: [Good distention of the joint with contrast. No intra-articular body.  -Joint capsule/ligaments: Intact joint capsule. Ligamentum teres is intact.   -Morphology: No abnormal underlying hip morphology.    MUSCLES AND TENDONS:   -Gluteal: No tendon tear or tendinopathy. No trochanteric bursitis.  -Proximal hamstring: No tendon tear or tendinopathy.   -Iliopsoas: No tendon tear or tendinopathy. No bursitis.  -Rectus femoris origin: No tear or tendinopathy.    BONES:   -Moderate marrow edema in the superior aspect of the femoral head and acetabulum related to overlying full-thickness chondromalacia or stress reaction. There is no fracture.    SOFT TISSUES:  -Normal muscle bulk. No acute muscular injury.    INTRAPELVIC CONTENTS:   -Visualized portions are normal.      Impression    IMPRESSION:  1.  Extensive right acetabular labral tear with a moderate-sized  paralabral cyst.  2.  Moderate amount of full-thickness chondromalacia in the superior aspect of the femoral head and acetabulum.  3.  Marrow edema in the superior aspect of the femoral head and acetabulum related to overlying full-thickness chondromalacia or stress reaction. No fracture.       I agree with the above reading.    Independent visualization of the images was performed.    Assessment:  1.  Right hip labral tear  2.  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  Discussed MRI results.  2.  Pain could be coming from labral tear versus chondromalacia.  3.  Since patient continues to get worse and has tried therapy as well as a steroid injection I recommended for total referral to Dr. Andrade Dowell.  4.  Discussed that Dr. Dowell works with labral tears which she has and also will take into consideration the chondromalacia.  5.  Ortho  placed to see Dr. Dowell in [place].  6.  Follow-up with me as needed.      Virtual Visit Details    Type of service:  Video Visit     Originating Location (pt. Location): Home    Distant Location (provider location):  On-site  Platform used for Video Visit: Mariaelena         This note was dictated with [employer].    Shaheen Billings PA-C          Again, thank you for allowing me to participate in the care of your patient.        Sincerely,        Shaheen Billings PA-C    Electronically signed

## 2025-05-06 ENCOUNTER — PATIENT OUTREACH (OUTPATIENT)
Dept: CARE COORDINATION | Facility: CLINIC | Age: 60
End: 2025-05-06
Payer: COMMERCIAL

## 2025-05-07 NOTE — TELEPHONE ENCOUNTER
REASON FOR VISIT: Chondromalacia of right hip    DATE OF APPT: 5/20/2025   NOTES (FOR ALL VISITS) STATUS DETAILS   OFFICE NOTE from referring provider [facility]  Shaheen Billings PA-C 5/05/2025   EMG N/A    MEDICATION LIST N/A    IMAGING  (FOR ALL VISITS)     XR [facility]  XR Pelvis/hip right 1/06/2025   MRI (HEAD, NECK, SPINE) [facility]  MR Hip arthrogram right 4/29/2025   CT (HEAD, NECK, SPINE) N/A

## 2025-05-20 ENCOUNTER — PRE VISIT (OUTPATIENT)
Dept: ORTHOPEDICS | Facility: CLINIC | Age: 60
End: 2025-05-20

## 2025-05-20 ENCOUNTER — TELEPHONE (OUTPATIENT)
Dept: ORTHOPEDICS | Facility: CLINIC | Age: 60
End: 2025-05-20

## 2025-05-20 NOTE — TELEPHONE ENCOUNTER
Procedure: RTKA  Facility: [facility]  Length: 120 minutes  Anesthesia: Choice  Post-op appointments needed: 2 weeks Provider/PA only, 6 weeks with provider only.  Surgery packet/instructions given to patient?  Yes   PT: ?  PCP: ?  Packet not fully reviewed as patient needs to think about timing.    Teaching Flowsheet     Visit Type: In Clinic      Total Time Spent: 10 min.    Teaching Topic: Surgery teaching    Surgeon: Dr. Dowell  Type of Anesthesia: Choice    Pertinent Medical History: No Pertinent Medical History  Were medical conditions reviewed and appropriate for location? Yes  BMI:     Person(s) involved in teaching:   Patient  : No.   Verified Patient's Phone Number: YES: see chart    Caregiver//  Name:   Phone Number: in chart   Relationship:   Consent to Communicate on file:   Authorization to Share Protected Health Information- Person to person communication signed by patient and authorized the following person or people:      Motivation Level:  Asks Questions: Yes  Eager to Learn: Yes  Cooperative: Yes  Receptive (willing/able to accept information): Yes  Any cultural factors/Shinto beliefs that may influence understanding or compliance? No     Patient demonstrates understanding of the following:  Reason for the appointment, diagnosis and treatment plan: Yes  Knowledge of proper use of medications and conditions for which they are ordered (with special attention to potential side effects or drug interactions): Yes  Which situations necessitate calling provider and whom to contact: Yes     Teaching Concerns Addressed:   Proper use and care of medical equip, care aids, etc.: Yes  Nutritional needs and diet plan: Yes  Pain management techniques: NA  Wound Care: Yes  How and/when to access community resources: Yes  Need for pre-op with in 30 days: YES, will be done with PCP. I asked them to ensure they go over their daily medications during this visit and discuss what  medications need to be stopped before surgery and when. If you are doing a pre-op with your PCP and they are not within the [facility], I ask them to fax it to our pre-op office. Patient verbalized understanding.       Does patient have difficulty getting a ride to appointments (post-ops, PT/OT): No  Patient's plan after discharge: home with family or spouse     Instructional Materials Used/Given:  two bottles of chlorhexidine soap and a surgery packet . Instructed patient to buy or get two 8 ounces bottles of antiseptic surgical soap called 4% CHG. Common name brand of this soap are Hibiclens and Exidine. I told them they can find this at their local pharmacy, clinic or retail store. If they have trouble finding it, I told them to ask their pharmacist to help them find a substitute.   - Important Contact Info/Phone Numbers: emphasizing clinic number [phone] and after hours number [phone]  - Map/location of surgery and follow-up appointments  - Showering instructions  - Stoplight Tool     -Next step: schedule a surgery date.    Brittany Sexton RN

## 2025-05-23 NOTE — TELEPHONE ENCOUNTER
Outgoing patient call to schedule surgery with Dr. Dowell    Spoke with: Oumou     Reason for Surgical Date: Wanted [facility] next opening    Date of Surgery: 7-11-25    Estimated Arrival time Discussed with Patient:  No    Location of surgery: [facility]     Pre-Op H&P: Florinda Angelito @ [facility]    Imaging: No     Additional Appointments: No     Post-Op Appt Date w/ NP/PA:  Mary Mcarthur PA-C  7-29-25 at [place]    Post-Op Appt Date w/ Surgeon  Dr. Dowell  8-19-25 at [place]     Discussed with patient pre-op RN will call 2-3 days prior to surgery with arrival time and instructions:  Yes     Standard Surgery Packet Sent: Yes 05/23/25  via Received in clinic by RN       Additional Comments:       Nora Lara on 5/23/2025 at 3:11 PM

## 2025-05-28 PROBLEM — M25.551 HIP PAIN, RIGHT: Status: RESOLVED | Noted: 2025-03-12 | Resolved: 2025-05-28

## 2025-06-13 ASSESSMENT — ANXIETY QUESTIONNAIRES
2. NOT BEING ABLE TO STOP OR CONTROL WORRYING: NOT AT ALL
3. WORRYING TOO MUCH ABOUT DIFFERENT THINGS: NOT AT ALL
7. FEELING AFRAID AS IF SOMETHING AWFUL MIGHT HAPPEN: NOT AT ALL
IF YOU CHECKED OFF ANY PROBLEMS ON THIS QUESTIONNAIRE, HOW DIFFICULT HAVE THESE PROBLEMS MADE IT FOR YOU TO DO YOUR WORK, TAKE CARE OF THINGS AT HOME, OR GET ALONG WITH OTHER PEOPLE: NOT DIFFICULT AT ALL
4. TROUBLE RELAXING: SEVERAL DAYS
GAD7 TOTAL SCORE: 3
GAD7 TOTAL SCORE: 3
7. FEELING AFRAID AS IF SOMETHING AWFUL MIGHT HAPPEN: NOT AT ALL
5. BEING SO RESTLESS THAT IT IS HARD TO SIT STILL: NOT AT ALL
8. IF YOU CHECKED OFF ANY PROBLEMS, HOW DIFFICULT HAVE THESE MADE IT FOR YOU TO DO YOUR WORK, TAKE CARE OF THINGS AT HOME, OR GET ALONG WITH OTHER PEOPLE?: NOT DIFFICULT AT ALL
1. FEELING NERVOUS, ANXIOUS, OR ON EDGE: SEVERAL DAYS
GAD7 TOTAL SCORE: 3
6. BECOMING EASILY ANNOYED OR IRRITABLE: SEVERAL DAYS

## 2025-06-13 ASSESSMENT — ASTHMA QUESTIONNAIRES
QUESTION_5 LAST FOUR WEEKS HOW WOULD YOU RATE YOUR ASTHMA CONTROL: COMPLETELY CONTROLLED
QUESTION_4 LAST FOUR WEEKS HOW OFTEN HAVE YOU USED YOUR RESCUE INHALER OR NEBULIZER MEDICATION (SUCH AS ALBUTEROL): NOT AT ALL
ACT_TOTALSCORE: 25
QUESTION_1 LAST FOUR WEEKS HOW MUCH OF THE TIME DID YOUR ASTHMA KEEP YOU FROM GETTING AS MUCH DONE AT WORK, SCHOOL OR AT HOME: NONE OF THE TIME
QUESTION_3 LAST FOUR WEEKS HOW OFTEN DID YOUR ASTHMA SYMPTOMS (WHEEZING, COUGHING, SHORTNESS OF BREATH, CHEST TIGHTNESS OR PAIN) WAKE YOU UP AT NIGHT OR EARLIER THAN USUAL IN THE MORNING: NOT AT ALL
QUESTION_2 LAST FOUR WEEKS HOW OFTEN HAVE YOU HAD SHORTNESS OF BREATH: NOT AT ALL

## 2025-06-15 ASSESSMENT — PATIENT HEALTH QUESTIONNAIRE - PHQ9
SUM OF ALL RESPONSES TO PHQ QUESTIONS 1-9: 1
10. IF YOU CHECKED OFF ANY PROBLEMS, HOW DIFFICULT HAVE THESE PROBLEMS MADE IT FOR YOU TO DO YOUR WORK, TAKE CARE OF THINGS AT HOME, OR GET ALONG WITH OTHER PEOPLE: NOT DIFFICULT AT ALL
SUM OF ALL RESPONSES TO PHQ QUESTIONS 1-9: 1

## 2025-06-16 ENCOUNTER — OFFICE VISIT (OUTPATIENT)
Dept: FAMILY MEDICINE | Facility: CLINIC | Age: 60
End: 2025-06-16
Payer: COMMERCIAL

## 2025-06-16 ENCOUNTER — RESULTS FOLLOW-UP (OUTPATIENT)
Dept: FAMILY MEDICINE | Facility: CLINIC | Age: 60
End: 2025-06-16

## 2025-06-16 VITALS
HEIGHT: 67 IN | HEART RATE: 64 BPM | RESPIRATION RATE: 16 BRPM | WEIGHT: 215.38 LBS | TEMPERATURE: 97.1 F | SYSTOLIC BLOOD PRESSURE: 118 MMHG | BODY MASS INDEX: 33.8 KG/M2 | OXYGEN SATURATION: 98 % | DIASTOLIC BLOOD PRESSURE: 70 MMHG

## 2025-06-16 DIAGNOSIS — M16.11 ARTHRITIS OF RIGHT HIP: ICD-10-CM

## 2025-06-16 DIAGNOSIS — E78.5 HYPERLIPIDEMIA LDL GOAL <160: ICD-10-CM

## 2025-06-16 DIAGNOSIS — Z01.818 PREOP GENERAL PHYSICAL EXAM: Primary | ICD-10-CM

## 2025-06-16 PROBLEM — E66.812 CLASS 2 SEVERE OBESITY WITH BODY MASS INDEX (BMI) OF 35 TO 39.9 WITH SERIOUS COMORBIDITY (H): Status: RESOLVED | Noted: 2025-02-12 | Resolved: 2025-06-16

## 2025-06-16 PROBLEM — E66.01 CLASS 2 SEVERE OBESITY WITH BODY MASS INDEX (BMI) OF 35 TO 39.9 WITH SERIOUS COMORBIDITY (H): Status: RESOLVED | Noted: 2025-02-12 | Resolved: 2025-06-16

## 2025-06-16 LAB
ANION GAP SERPL CALCULATED.3IONS-SCNC: 11 MMOL/L (ref 7–15)
BASOPHILS # BLD AUTO: 0 10E3/UL (ref 0–0.2)
BASOPHILS NFR BLD AUTO: 0 %
BUN SERPL-MCNC: 22.7 MG/DL (ref 8–23)
CALCIUM SERPL-MCNC: 10.2 MG/DL (ref 8.8–10.4)
CHLORIDE SERPL-SCNC: 101 MMOL/L (ref 98–107)
CHOLEST SERPL-MCNC: 237 MG/DL
CREAT SERPL-MCNC: 0.64 MG/DL (ref 0.51–0.95)
EGFRCR SERPLBLD CKD-EPI 2021: >90 ML/MIN/1.73M2
EOSINOPHIL # BLD AUTO: 0.1 10E3/UL (ref 0–0.7)
EOSINOPHIL NFR BLD AUTO: 2 %
ERYTHROCYTE [DISTWIDTH] IN BLOOD BY AUTOMATED COUNT: 12.5 % (ref 10–15)
FASTING STATUS PATIENT QL REPORTED: NO
FASTING STATUS PATIENT QL REPORTED: NO
GLUCOSE SERPL-MCNC: 97 MG/DL (ref 70–99)
HCO3 SERPL-SCNC: 28 MMOL/L (ref 22–29)
HCT VFR BLD AUTO: 41 % (ref 35–47)
HDLC SERPL-MCNC: 85 MG/DL
HGB BLD-MCNC: 13.9 G/DL (ref 11.7–15.7)
IMM GRANULOCYTES # BLD: 0 10E3/UL
IMM GRANULOCYTES NFR BLD: 0 %
LDLC SERPL CALC-MCNC: 121 MG/DL
LYMPHOCYTES # BLD AUTO: 2.3 10E3/UL (ref 0.8–5.3)
LYMPHOCYTES NFR BLD AUTO: 34 %
MCH RBC QN AUTO: 29.2 PG (ref 26.5–33)
MCHC RBC AUTO-ENTMCNC: 33.9 G/DL (ref 31.5–36.5)
MCV RBC AUTO: 86 FL (ref 78–100)
MONOCYTES # BLD AUTO: 0.4 10E3/UL (ref 0–1.3)
MONOCYTES NFR BLD AUTO: 6 %
NEUTROPHILS # BLD AUTO: 3.9 10E3/UL (ref 1.6–8.3)
NEUTROPHILS NFR BLD AUTO: 58 %
NONHDLC SERPL-MCNC: 152 MG/DL
PLATELET # BLD AUTO: 216 10E3/UL (ref 150–450)
POTASSIUM SERPL-SCNC: 4.7 MMOL/L (ref 3.4–5.3)
RBC # BLD AUTO: 4.76 10E6/UL (ref 3.8–5.2)
SODIUM SERPL-SCNC: 140 MMOL/L (ref 135–145)
TRIGL SERPL-MCNC: 157 MG/DL
WBC # BLD AUTO: 6.7 10E3/UL (ref 4–11)

## 2025-06-16 PROCEDURE — 80061 LIPID PANEL: CPT | Performed by: NURSE PRACTITIONER

## 2025-06-16 PROCEDURE — 80048 BASIC METABOLIC PNL TOTAL CA: CPT | Performed by: NURSE PRACTITIONER

## 2025-06-16 PROCEDURE — 3078F DIAST BP <80 MM HG: CPT | Performed by: NURSE PRACTITIONER

## 2025-06-16 PROCEDURE — 36415 COLL VENOUS BLD VENIPUNCTURE: CPT | Performed by: NURSE PRACTITIONER

## 2025-06-16 PROCEDURE — 3074F SYST BP LT 130 MM HG: CPT | Performed by: NURSE PRACTITIONER

## 2025-06-16 PROCEDURE — 85025 COMPLETE CBC W/AUTO DIFF WBC: CPT | Performed by: NURSE PRACTITIONER

## 2025-06-16 PROCEDURE — 1125F AMNT PAIN NOTED PAIN PRSNT: CPT | Performed by: NURSE PRACTITIONER

## 2025-06-16 PROCEDURE — 99214 OFFICE O/P EST MOD 30 MIN: CPT | Performed by: NURSE PRACTITIONER

## 2025-06-16 PROCEDURE — G2211 COMPLEX E/M VISIT ADD ON: HCPCS | Performed by: NURSE PRACTITIONER

## 2025-06-16 PROCEDURE — 93000 ELECTROCARDIOGRAM COMPLETE: CPT | Performed by: NURSE PRACTITIONER

## 2025-06-16 ASSESSMENT — PAIN SCALES - GENERAL: PAINLEVEL_OUTOF10: SEVERE PAIN (8)

## 2025-06-16 NOTE — PATIENT INSTRUCTIONS
How to Take Your Medication Before Surgery  Preoperative Medication Instructions   Antiplatelet or Anticoagulation Medication Instructions   - We reviewed the medication list and the patient is not on an antiplatelet or anticoagulation medications.    Additional Medication Instructions   - Herbal medications and vitamins: DO NOT TAKE 14 days prior to surgery.  Hold all NSAIDS prior to surgery for 5 days.       Patient Education   Preparing for Your Surgery  For Adults  Getting started  In most cases, a nurse will call to review your health history and instructions. They will give you an arrival time based on your scheduled surgery time. Please be ready to share:  Your doctor's clinic name and phone number  Your medical, surgical, and anesthesia history  A list of allergies and sensitivities  A list of medicines, including herbal treatments and over-the-counter drugs  Whether the patient has a legal guardian (ask how to send us the papers in advance)  Note: You may not receive a call if you were seen at our PAC (Preoperative Assessment Center).  Please tell us if you're pregnant--or if there's any chance you might be pregnant. Some surgeries may injure a fetus (unborn baby), so they require a pregnancy test. Surgeries that are safe for a fetus don't always need a test, and you can choose whether to have one.   Preparing for surgery  Within 10 to 30 days of surgery: Have a pre-op exam (sometimes called an H&P, or History and Physical). This can be done at a clinic or pre-operative center.  If you're having a , you may not need this exam. Talk to your care team.  At your pre-op exam, talk to your care team about all medicines you take. (This includes CBD oil and any drugs, such as THC, marijuana, and other forms of cannabis.) If you need to stop any medicine before surgery, ask when to start taking it again.  This is for your safety. Many medicines and drugs can make you bleed too much during surgery. Some  change how well surgery (anesthesia) drugs work.  Call your insurance company to let them know you're having surgery. (If you don't have insurance, call [phone].)  Call your clinic if there's any change in your health. This includes a scrape or scratch near the surgery site, or any signs of a cold (sore throat, runny nose, cough, rash, fever).  Eating and drinking guidelines  For your safety: Unless your surgeon tells you otherwise, follow the guidelines below.  Eat and drink as normal until 8 hours before you arrive for surgery. After that, no food or milk. You can spit out gum when you arrive.  Drink clear liquids until 2 hours before you arrive. These are liquids you can see through, like water, Gatorade, and Propel Water. They also include plain black coffee and tea (no cream or milk).  No alcohol for 24 hours before you arrive. The night before surgery, stop any drinks that contain THC.  If your care team tells you to take medicine on the morning of surgery, it's okay to take it with a sip of water. No other medicines or drugs are allowed (including CBD oil)--follow your care team's instructions.  If you have questions the day of surgery, call your hospital or surgery center.   Preventing infection  Shower or bathe the night before and the morning of surgery. Follow the instructions your clinic gave you. (If no instructions, use regular soap.)  Don't shave or clip hair near your surgery site. We'll remove the hair if needed.  Don't smoke or vape the morning of surgery. No chewing tobacco for 6 hours before you arrive. A nicotine patch is okay. You may spit out nicotine gum when you arrive.  For some surgeries, the surgeon will tell you to fully quit smoking and nicotine.  We will make every effort to keep you safe from infection. We will:  Clean our hands often with soap and water (or an alcohol-based hand rub).  Clean the skin at your surgery site with a special soap that kills germs.  Give you a special  gown to keep you warm. (Cold raises the risk of infection.)  Wear hair covers, masks, gowns, and gloves during surgery.  Give antibiotic medicine, if prescribed. Not all surgeries need this medicine.  What to bring on the day of surgery  Photo ID and insurance card  Copy of your health care directive, if you have one  Glasses and hearing aids (bring cases)  You can't wear contacts during surgery  Inhaler and eye drops, if you use them (tell us about these when you arrive)  CPAP machine or breathing device, if you use them  A few personal items, if spending the night  If you have . . .  A pacemaker, ICD (cardiac defibrillator), or other implant: Bring the ID card.  An implanted stimulator: Bring the remote control.  A legal guardian: Bring a copy of the certified (court-stamped) guardianship papers.  Please remove any jewelry, including body piercings. Leave jewelry and other valuables at home.  If you're going home the day of surgery  You must have a support person drive you home. They should stay with you overnight, and they may need to help with your self-care.  If you don't have a support person, please tells us as soon as possible. We can help.  After surgery  If it's hard to control your pain or you need more pain medicine, please call your surgeon's office.  Questions?   If you have any questions for your care team, list them here:   ____________________________________________________________________________________________________________________________________________________________________________________________________________________________________________________________  For informational purposes only. Not to replace the advice of your health care provider. Copyright   2003, 2019 [facility]. All rights reserved. Clinically reviewed by Shahriar Locke MD. [employer] 304139 - REV 02/25.

## 2025-06-16 NOTE — PROGRESS NOTES
Preoperative Evaluation  [facility] [place]  [address], [address]  [place] [address]  Phone: [phone]  Fax: [phone]  Primary Provider: TORIBIO Steen CNP  Pre-op Performing Provider: TORIBIO Steen CNP  Jun 16, 2025 6/13/2025   Surgical Information   What procedure is being done? Right hip replacement, ARTHROPLASTY, HIP, TOTAL    Facility or Hospital where procedure/surgery will be performed: [facility]   Who is doing the procedure / surgery? Dr Andrade Dowell   Date of surgery / procedure: 7/11/2025   Time of surgery / procedure: unknown bit expected to be am   Where do you plan to recover after surgery? at home with family     Answers submitted by the patient for this visit:  Patient Health Questionnaire (Submitted on 6/15/2025)  If you checked off any problems, how difficult have these problems made it for you to do your work, take care of things at home, or get along with other people?: Not difficult at all  PHQ9 TOTAL SCORE: 1  Patient Health Questionnaire (G7) (Submitted on 6/13/2025)  MICHELLE 7 TOTAL SCORE: 3      Fax number for surgical facility: Note does not need to be faxed, will be available electronically in Epic.  [phone]- Fax    Assessment & Plan     The proposed surgical procedure is considered INTERMEDIATE risk.    ASSESSMENT/ORDERS:    ICD-10-CM    1. Preop general physical exam  Z01.818 EKG 12-lead complete w/read - Clinics     CBC with Platelets & Differential     Basic metabolic panel     CBC with Platelets & Differential     Basic metabolic panel      2. Arthritis of right hip  M16.11       3. Hyperlipidemia LDL goal <160  E78.5 Lipid panel reflex to direct LDL Non-fasting          Assessment & Plan  Arthritis of right hip:  - Right hip pain since end of October, sharp and piercing.  - Muscle relaxers may help with pain management. Stretchy gentle massage recommended. Use pain as a guide for movement.    Preop general physical  exam:  - Preoperative evaluation for upcoming surgery.  - Labs ordered. Low dose aspirin post-surgery for blood clot prevention. EKG recommended due to SVT history. Use surgical body wash night before and morning of surgery. Sleep on clean sheets. Have ice packs ready post-surgery.    Constipation prevention:  - Risk of constipation post-surgery.  - Take gentle laxative if no bowel movement for more than 2 days. Increase water intake to aid bowel movements.    Asthma management:  - Asthma well-controlled, no recent use of inhaler.    SVT history:  - History of SVT noted. No recurrence since ablation.   - EKG recommended.    The longitudinal plan of care for the diagnosis(es)/condition(s) as documented were addressed during this visit. Due to the added complexity in care, I will continue to support Christiano in the subsequent management and with ongoing continuity of care.               - No identified additional risk factors other than previously addressed    Preoperative Medication Instructions  Antiplatelet or Anticoagulation Medication Instructions   - We reviewed the medication list and the patient is not on an antiplatelet or anticoagulation medications.    Additional Medication Instructions   - Herbal medications and vitamins: DO NOT TAKE 14 days prior to surgery.  Hold all NSAIDS prior to surgery for 5 days.    Recommendation  Approval given to proceed with proposed procedure, without further diagnostic evaluation.    Follow-up       Subjective   Christiano is a 60 year old, presenting for the following:  Pre-Op Exam          6/16/2025     9:58 AM   Additional Questions   Roomed by EDEN ALEGRE: Right chronic hip pain from OA, necessitating surgical intervention          6/13/2025   Pre-Op Questionnaire   Have you ever had a heart attack or stroke? No   Have you ever had surgery on your heart or blood vessels, such as a stent placement, a coronary artery bypass, or surgery on an artery in your head, neck, heart, or legs?  No   Do you have chest pain with activity? No   Do you have a history of heart failure? No   Do you currently have a cold, bronchitis or symptoms of other infection? No   Do you have a cough, shortness of breath, or wheezing? No   Do you or anyone in your family have previous history of blood clots? No   Do you or does anyone in your family have a serious bleeding problem such as prolonged bleeding following surgeries or cuts? No   Have you ever had problems with anemia or been told to take iron pills? No   Have you had any abnormal blood loss such as black, tarry or bloody stools, or abnormal vaginal bleeding? No   Have you ever had a blood transfusion? No   Are you willing to have a blood transfusion if it is medically needed before, during, or after your surgery? Yes   Have you or any of your relatives ever had problems with anesthesia? No   Do you have sleep apnea, excessive snoring or daytime drowsiness? No   Do you have any artifical heart valves or other implanted medical devices like a pacemaker, defibrillator, or continuous glucose monitor? No   Do you have artificial joints? No   Are you allergic to latex? No     Advance Care Planning    Discussed advance care planning with patient; informed AVS has link to [employer].    Preoperative Review of    reviewed - no record of controlled substances prescribed.      Can do over 4 METS of activity without symptoms.     Status of Chronic Conditions:  See problem list for active medical problems.  Problems all longstanding and stable, except as noted/documented.  See ROS for pertinent symptoms related to these conditions.    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 s/p excision 2-10-15 02/10/2015     Priority: Medium    Basal cell carcinoma of left frontal scalp (hairline) s/p mohs 2-10-15 02/10/2015     Priority: Medium    Basal cell carcinoma of anterior chest s/p excision 11-4-14 11/04/2014     Priority: Medium    Hx of hysterectomy 11/12/2012     Priority: Medium    Hammer toe 12/12/2011     Priority: Medium    Sinus bradycardia 01/12/2011     Priority: Medium     EKG 1/12/11 due to irregular heart beat      Hyperlipidemia LDL goal <160 10/31/2010     Priority: Medium     Saw Lilly Denis 2002 for dietary counseling      Persistent disorder of initiating or maintaining sleep 07/23/2007     Priority: Medium      Past Medical History:   Diagnosis Date    Allergy, unspecified not elsewhere classified 2003    s/p desensitization    Anxiety     Atrial tachycardia     ablation 3/24/11    Bartholin's abscess 7/09    Basal cell carcinoma of anterior chest s/p excision 11-4-14 11/4/2014    Bradycardia     Excessive or frequent menstruation 2001    s/p TVH    Transient disorder of initiating or maintaining sleep 2005     Past Surgical History:   Procedure Laterality Date    ABDOMEN SURGERY  2001    Hysterectomy    ARTHROPLASTY TOE(S)  12/02/2013    Procedure: ARTHROPLASTY TOE(S);;  Surgeon: Ab Palacios DPM;  Location: [facility]    BIOPSY  2014, 2016, 2017    skin cancer biopsies, multiple    BUNIONECTOMY  12/02/2013    Procedure: BUNIONECTOMY;  Bunionectomy, resection of arthroplasty of PIP joint right foot;  Surgeon: Ab Palacios DPM;  Location: [facility] OR    CARDIAC SURGERY  ablation for abnormal rhythym    2013    COSMETIC SURGERY  abdomiplasty, lipo    6/2016, 4/2017    EYE SURGERY  lasik    11/2007    HC REVISE MEDIAN N/CARPAL TUNNEL SURG      bilateral carpal tunnel repair    HYSTERECTOMY, PAP NO LONGER INDICATED  2001    HYSTERECTOMY, PAP NO LONGER INDICATED  2001    HYSTERECTOMY, VAGINAL  01/30/2001    TVH for  "menorraghia    LA SKIN TAG REMOVAL 1 TO 8 LESIONS PER LESION  2/10/2025    REPAIR HAMMER TOE  12/19/2011    Procedure:REPAIR HAMMER TOE; Arthroplasty PIPJ 5th toe, left; Surgeon:VINAYAK GARG; Location:[facility] OR    SURGICAL HISTORY OF -   08/2009    Bilat LE Laser ablation, varicose veins    SURGICAL HISTORY OF -   03/24/2011    atrial ablation    ZZC LIGATE FALLOPIAN TUBE       Current Outpatient Medications   Medication Sig Dispense Refill    albuterol (PROAIR HFA) 108 (90 Base) MCG/ACT inhaler Inhale 1-2 puffs into the lungs every 4 hours as needed for shortness of breath / dyspnea or wheezing 18 g 0       Allergies   Allergen Reactions    No Known Drug Allergy         Social History     Tobacco Use    Smoking status: Never     Passive exposure: Never    Smokeless tobacco: Never   Substance Use Topics    Alcohol use: No     Comment: none at all        History   Drug Use Unknown             Review of Systems  Constitutional, HEENT, cardiovascular, pulmonary, GI, , musculoskeletal, neuro, skin, endocrine and psych systems are negative, except as otherwise noted.    Objective    /70 (BP Location: Left arm, Patient Position: Chair, Cuff Size: Adult Large)   Pulse 64   Temp 97.1  F (36.2  C) (Temporal)   Resp 16   Ht 1.702 m (5' 7\")   Wt 97.7 kg (215 lb 6 oz)   LMP  (LMP Unknown)   SpO2 98%   Breastfeeding No   BMI 33.73 kg/m     Estimated body mass index is 33.73 kg/m  as calculated from the following:    Height as of this encounter: 1.702 m (5' 7\").    Weight as of this encounter: 97.7 kg (215 lb 6 oz).  Physical Exam  GENERAL: alert and no distress  EYES: Eyes grossly normal to inspection, PERRL and conjunctivae and sclerae normal  HENT: ear canals and TM's normal, nose and mouth without ulcers or lesions  NECK: no adenopathy, no asymmetry, masses, or scars  RESP: lungs clear to auscultation - no rales, rhonchi or wheezes  CV: regular rate and rhythm, normal S1 S2, no S3 or S4, no murmur, click or " rub, no peripheral edema  ABDOMEN: soft, nontender, no hepatosplenomegaly, no masses and bowel sounds normal  MS: no gross musculoskeletal defects noted, no edema  SKIN: no suspicious lesions or rashes  NEURO: Normal strength and tone, mentation intact and speech normal  PSYCH: mentation appears normal, affect normal/bright    Recent Labs   Lab Test 01/02/25  0802   HGB 13.5      A1C 5.2        Diagnostics  Labs pending at this time.  Results will be reviewed when available.   EKG required for HX of arrhythmia and not completed in the last 90 days.     Revised Cardiac Risk Index (RCRI)  The patient has the following serious cardiovascular risks for perioperative complications:   - No serious cardiac risks = 0 points     RCRI Interpretation: 0 points: Class I (very low risk - 0.4% complication rate)         Signed Electronically by: TORIBIO Steen CNP  A copy of this evaluation report is provided to the requesting physician.

## 2025-06-16 NOTE — H&P (VIEW-ONLY)
Preoperative Evaluation  [facility] [place]  [address], [address]  [place] [address]  Phone: [phone]  Fax: [phone]  Primary Provider: TORIBIO Steen CNP  Pre-op Performing Provider: TORIBIO Steen CNP  Jun 16, 2025 6/13/2025   Surgical Information   What procedure is being done? Right hip replacement, ARTHROPLASTY, HIP, TOTAL    Facility or Hospital where procedure/surgery will be performed: [facility]   Who is doing the procedure / surgery? Dr Andrade Dowell   Date of surgery / procedure: 7/11/2025   Time of surgery / procedure: unknown bit expected to be am   Where do you plan to recover after surgery? at home with family     Answers submitted by the patient for this visit:  Patient Health Questionnaire (Submitted on 6/15/2025)  If you checked off any problems, how difficult have these problems made it for you to do your work, take care of things at home, or get along with other people?: Not difficult at all  PHQ9 TOTAL SCORE: 1  Patient Health Questionnaire (G7) (Submitted on 6/13/2025)  MICHELLE 7 TOTAL SCORE: 3      Fax number for surgical facility: Note does not need to be faxed, will be available electronically in Epic.  [phone]- Fax    Assessment & Plan     The proposed surgical procedure is considered INTERMEDIATE risk.    ASSESSMENT/ORDERS:    ICD-10-CM    1. Preop general physical exam  Z01.818 EKG 12-lead complete w/read - Clinics     CBC with Platelets & Differential     Basic metabolic panel     CBC with Platelets & Differential     Basic metabolic panel      2. Arthritis of right hip  M16.11       3. Hyperlipidemia LDL goal <160  E78.5 Lipid panel reflex to direct LDL Non-fasting          Assessment & Plan  Arthritis of right hip:  - Right hip pain since end of October, sharp and piercing.  - Muscle relaxers may help with pain management. Stretchy gentle massage recommended. Use pain as a guide for movement.    Preop general physical  exam:  - Preoperative evaluation for upcoming surgery.  - Labs ordered. Low dose aspirin post-surgery for blood clot prevention. EKG recommended due to SVT history. Use surgical body wash night before and morning of surgery. Sleep on clean sheets. Have ice packs ready post-surgery.    Constipation prevention:  - Risk of constipation post-surgery.  - Take gentle laxative if no bowel movement for more than 2 days. Increase water intake to aid bowel movements.    Asthma management:  - Asthma well-controlled, no recent use of inhaler.    SVT history:  - History of SVT noted. No recurrence since ablation.   - EKG recommended.    The longitudinal plan of care for the diagnosis(es)/condition(s) as documented were addressed during this visit. Due to the added complexity in care, I will continue to support Christiano in the subsequent management and with ongoing continuity of care.               - No identified additional risk factors other than previously addressed    Preoperative Medication Instructions  Antiplatelet or Anticoagulation Medication Instructions   - We reviewed the medication list and the patient is not on an antiplatelet or anticoagulation medications.    Additional Medication Instructions   - Herbal medications and vitamins: DO NOT TAKE 14 days prior to surgery.  Hold all NSAIDS prior to surgery for 5 days.    Recommendation  Approval given to proceed with proposed procedure, without further diagnostic evaluation.    Follow-up       Subjective   Christiano is a 60 year old, presenting for the following:  Pre-Op Exam          6/16/2025     9:58 AM   Additional Questions   Roomed by EDEN ALEGRE: Right chronic hip pain from OA, necessitating surgical intervention          6/13/2025   Pre-Op Questionnaire   Have you ever had a heart attack or stroke? No   Have you ever had surgery on your heart or blood vessels, such as a stent placement, a coronary artery bypass, or surgery on an artery in your head, neck, heart, or legs?  No   Do you have chest pain with activity? No   Do you have a history of heart failure? No   Do you currently have a cold, bronchitis or symptoms of other infection? No   Do you have a cough, shortness of breath, or wheezing? No   Do you or anyone in your family have previous history of blood clots? No   Do you or does anyone in your family have a serious bleeding problem such as prolonged bleeding following surgeries or cuts? No   Have you ever had problems with anemia or been told to take iron pills? No   Have you had any abnormal blood loss such as black, tarry or bloody stools, or abnormal vaginal bleeding? No   Have you ever had a blood transfusion? No   Are you willing to have a blood transfusion if it is medically needed before, during, or after your surgery? Yes   Have you or any of your relatives ever had problems with anesthesia? No   Do you have sleep apnea, excessive snoring or daytime drowsiness? No   Do you have any artifical heart valves or other implanted medical devices like a pacemaker, defibrillator, or continuous glucose monitor? No   Do you have artificial joints? No   Are you allergic to latex? No     Advance Care Planning    Discussed advance care planning with patient; informed AVS has link to [employer].    Preoperative Review of    reviewed - no record of controlled substances prescribed.      Can do over 4 METS of activity without symptoms.     Status of Chronic Conditions:  See problem list for active medical problems.  Problems all longstanding and stable, except as noted/documented.  See ROS for pertinent symptoms related to these conditions.    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 s/p excision 2-10-15 02/10/2015     Priority: Medium    Basal cell carcinoma of left frontal scalp (hairline) s/p mohs 2-10-15 02/10/2015     Priority: Medium    Basal cell carcinoma of anterior chest s/p excision 11-4-14 11/04/2014     Priority: Medium    Hx of hysterectomy 11/12/2012     Priority: Medium    Hammer toe 12/12/2011     Priority: Medium    Sinus bradycardia 01/12/2011     Priority: Medium     EKG 1/12/11 due to irregular heart beat      Hyperlipidemia LDL goal <160 10/31/2010     Priority: Medium     Saw Lilly Denis 2002 for dietary counseling      Persistent disorder of initiating or maintaining sleep 07/23/2007     Priority: Medium      Past Medical History:   Diagnosis Date    Allergy, unspecified not elsewhere classified 2003    s/p desensitization    Anxiety     Atrial tachycardia     ablation 3/24/11    Bartholin's abscess 7/09    Basal cell carcinoma of anterior chest s/p excision 11-4-14 11/4/2014    Bradycardia     Excessive or frequent menstruation 2001    s/p TVH    Transient disorder of initiating or maintaining sleep 2005     Past Surgical History:   Procedure Laterality Date    ABDOMEN SURGERY  2001    Hysterectomy    ARTHROPLASTY TOE(S)  12/02/2013    Procedure: ARTHROPLASTY TOE(S);;  Surgeon: Ab Palacios DPM;  Location: [facility]    BIOPSY  2014, 2016, 2017    skin cancer biopsies, multiple    BUNIONECTOMY  12/02/2013    Procedure: BUNIONECTOMY;  Bunionectomy, resection of arthroplasty of PIP joint right foot;  Surgeon: Ab Palacios DPM;  Location: [facility] OR    CARDIAC SURGERY  ablation for abnormal rhythym    2013    COSMETIC SURGERY  abdomiplasty, lipo    6/2016, 4/2017    EYE SURGERY  lasik    11/2007    HC REVISE MEDIAN N/CARPAL TUNNEL SURG      bilateral carpal tunnel repair    HYSTERECTOMY, PAP NO LONGER INDICATED  2001    HYSTERECTOMY, PAP NO LONGER INDICATED  2001    HYSTERECTOMY, VAGINAL  01/30/2001    TVH for  "menorraghia    DE SKIN TAG REMOVAL 1 TO 8 LESIONS PER LESION  2/10/2025    REPAIR HAMMER TOE  12/19/2011    Procedure:REPAIR HAMMER TOE; Arthroplasty PIPJ 5th toe, left; Surgeon:VINAYAK GARG; Location:[facility] OR    SURGICAL HISTORY OF -   08/2009    Bilat LE Laser ablation, varicose veins    SURGICAL HISTORY OF -   03/24/2011    atrial ablation    ZZC LIGATE FALLOPIAN TUBE       Current Outpatient Medications   Medication Sig Dispense Refill    albuterol (PROAIR HFA) 108 (90 Base) MCG/ACT inhaler Inhale 1-2 puffs into the lungs every 4 hours as needed for shortness of breath / dyspnea or wheezing 18 g 0       Allergies   Allergen Reactions    No Known Drug Allergy         Social History     Tobacco Use    Smoking status: Never     Passive exposure: Never    Smokeless tobacco: Never   Substance Use Topics    Alcohol use: No     Comment: none at all        History   Drug Use Unknown             Review of Systems  Constitutional, HEENT, cardiovascular, pulmonary, GI, , musculoskeletal, neuro, skin, endocrine and psych systems are negative, except as otherwise noted.    Objective    /70 (BP Location: Left arm, Patient Position: Chair, Cuff Size: Adult Large)   Pulse 64   Temp 97.1  F (36.2  C) (Temporal)   Resp 16   Ht 1.702 m (5' 7\")   Wt 97.7 kg (215 lb 6 oz)   LMP  (LMP Unknown)   SpO2 98%   Breastfeeding No   BMI 33.73 kg/m     Estimated body mass index is 33.73 kg/m  as calculated from the following:    Height as of this encounter: 1.702 m (5' 7\").    Weight as of this encounter: 97.7 kg (215 lb 6 oz).  Physical Exam  GENERAL: alert and no distress  EYES: Eyes grossly normal to inspection, PERRL and conjunctivae and sclerae normal  HENT: ear canals and TM's normal, nose and mouth without ulcers or lesions  NECK: no adenopathy, no asymmetry, masses, or scars  RESP: lungs clear to auscultation - no rales, rhonchi or wheezes  CV: regular rate and rhythm, normal S1 S2, no S3 or S4, no murmur, click or " rub, no peripheral edema  ABDOMEN: soft, nontender, no hepatosplenomegaly, no masses and bowel sounds normal  MS: no gross musculoskeletal defects noted, no edema  SKIN: no suspicious lesions or rashes  NEURO: Normal strength and tone, mentation intact and speech normal  PSYCH: mentation appears normal, affect normal/bright    Recent Labs   Lab Test 01/02/25  0802   HGB 13.5      A1C 5.2        Diagnostics  Labs pending at this time.  Results will be reviewed when available.   EKG required for HX of arrhythmia and not completed in the last 90 days.     Revised Cardiac Risk Index (RCRI)  The patient has the following serious cardiovascular risks for perioperative complications:   - No serious cardiac risks = 0 points     RCRI Interpretation: 0 points: Class I (very low risk - 0.4% complication rate)         Signed Electronically by: TORIBIO Steen CNP  A copy of this evaluation report is provided to the requesting physician.

## 2025-07-02 RX ORDER — IBUPROFEN 200 MG
200-400 TABLET ORAL EVERY 4 HOURS PRN
Status: ON HOLD | COMMUNITY
End: 2025-07-11

## 2025-07-02 NOTE — PROGRESS NOTES
Planning to discharge home on the day if surgery with her parents staying with her and helping her after surgery.       07/02/25 1513   Discharge Planning   Patient/Family Anticipates Transition to home with family   Concerns to be Addressed all concerns addressed in this encounter   Living Arrangements   People in Home alone   Type of Residence Private Residence   Is your private residence a single family home or apartment? Single family home   Number of Stairs, Within Home, Primary none  (3 exterior steps with railing)   Once home, are you able to live on one level? Yes   Which level? Main Level   Bathroom Shower/Tub Walk-in shower   Equipment Currently Used at Home grab bar, tub/shower;raised toilet seat;shower chair;grab bar, toilet  (Has a cane and walker at home)   Support System   Support Systems Parent  (parents, Robert and Yael Evans, will come stay with her after surgery to help)   Do you have someone available to stay with you one or two nights once you are home? Yes   Education   Patient attended total joint pre-op class/received pre-op teaching  email/phone call

## 2025-07-11 ENCOUNTER — APPOINTMENT (OUTPATIENT)
Dept: RADIOLOGY | Facility: CLINIC | Age: 60
End: 2025-07-11
Attending: PHYSICIAN ASSISTANT
Payer: COMMERCIAL

## 2025-07-11 ENCOUNTER — HOSPITAL ENCOUNTER (OUTPATIENT)
Facility: CLINIC | Age: 60
Discharge: HOME | End: 2025-07-11
Attending: ORTHOPAEDIC SURGERY | Admitting: ORTHOPAEDIC SURGERY
Payer: COMMERCIAL

## 2025-07-11 ENCOUNTER — APPOINTMENT (OUTPATIENT)
Dept: PHYSICAL THERAPY | Facility: CLINIC | Age: 60
End: 2025-07-11
Attending: ORTHOPAEDIC SURGERY
Payer: COMMERCIAL

## 2025-07-11 VITALS
DIASTOLIC BLOOD PRESSURE: 72 MMHG | SYSTOLIC BLOOD PRESSURE: 134 MMHG | HEART RATE: 70 BPM | RESPIRATION RATE: 16 BRPM | WEIGHT: 218.5 LBS | OXYGEN SATURATION: 99 % | BODY MASS INDEX: 33.12 KG/M2 | TEMPERATURE: 97.5 F | HEIGHT: 68 IN

## 2025-07-11 DIAGNOSIS — Z96.641 S/P TOTAL RIGHT HIP ARTHROPLASTY: Primary | ICD-10-CM

## 2025-07-11 PROCEDURE — 360N000077 HC SURGERY LEVEL 4, PER MIN: Performed by: ORTHOPAEDIC SURGERY

## 2025-07-11 PROCEDURE — C1713 ANCHOR/SCREW BN/BN,TIS/BN: HCPCS | Performed by: ORTHOPAEDIC SURGERY

## 2025-07-11 PROCEDURE — 250N000009 HC RX 250: Performed by: PHYSICIAN ASSISTANT

## 2025-07-11 PROCEDURE — 710N000010 HC RECOVERY PHASE 1, LEVEL 2, PER MIN: Performed by: ORTHOPAEDIC SURGERY

## 2025-07-11 PROCEDURE — 97161 PT EVAL LOW COMPLEX 20 MIN: CPT | Mod: GP

## 2025-07-11 PROCEDURE — 250N000013 HC RX MED GY IP 250 OP 250 PS 637: Performed by: PHYSICIAN ASSISTANT

## 2025-07-11 PROCEDURE — C1776 JOINT DEVICE (IMPLANTABLE): HCPCS | Performed by: ORTHOPAEDIC SURGERY

## 2025-07-11 PROCEDURE — 250N000011 HC RX IP 250 OP 636: Performed by: ANESTHESIOLOGY

## 2025-07-11 PROCEDURE — 250N000013 HC RX MED GY IP 250 OP 250 PS 637: Performed by: ANESTHESIOLOGY

## 2025-07-11 PROCEDURE — 258N000001 HC RX 258: Performed by: ORTHOPAEDIC SURGERY

## 2025-07-11 PROCEDURE — 272N000001 HC OR GENERAL SUPPLY STERILE: Performed by: ORTHOPAEDIC SURGERY

## 2025-07-11 PROCEDURE — 999N000065 XR PELVIS AND HIP PORTABLE RIGHT 1 VIEW

## 2025-07-11 PROCEDURE — 250N000011 HC RX IP 250 OP 636: Performed by: ORTHOPAEDIC SURGERY

## 2025-07-11 PROCEDURE — 258N000003 HC RX IP 258 OP 636: Performed by: ANESTHESIOLOGY

## 2025-07-11 PROCEDURE — 97110 THERAPEUTIC EXERCISES: CPT | Mod: GP

## 2025-07-11 PROCEDURE — 27130 TOTAL HIP ARTHROPLASTY: CPT | Mod: RT | Performed by: ORTHOPAEDIC SURGERY

## 2025-07-11 PROCEDURE — 250N000009 HC RX 250: Performed by: ORTHOPAEDIC SURGERY

## 2025-07-11 PROCEDURE — 97116 GAIT TRAINING THERAPY: CPT | Mod: GP

## 2025-07-11 PROCEDURE — 370N000017 HC ANESTHESIA TECHNICAL FEE, PER MIN: Performed by: ORTHOPAEDIC SURGERY

## 2025-07-11 PROCEDURE — 710N000012 HC RECOVERY PHASE 2, PER MINUTE: Performed by: ORTHOPAEDIC SURGERY

## 2025-07-11 PROCEDURE — 999N000141 HC STATISTIC PRE-PROCEDURE NURSING ASSESSMENT: Performed by: ORTHOPAEDIC SURGERY

## 2025-07-11 DEVICE — OXINIUM FEMORAL HEAD 12/14 TAPER                                    36 MM M/+4
Type: IMPLANTABLE DEVICE | Site: HIP | Status: FUNCTIONAL
Brand: OXINIUM

## 2025-07-11 DEVICE — IMP SCR ACET SNN SPHERICAL HEAD 6.5X40MM 71332540: Type: IMPLANTABLE DEVICE | Site: HIP | Status: FUNCTIONAL

## 2025-07-11 DEVICE — POLARSTEM STANDARD NON-CEMENTED                                    WITH TI/HA 3
Type: IMPLANTABLE DEVICE | Site: HIP | Status: FUNCTIONAL
Brand: POLARSTEM

## 2025-07-11 DEVICE — IMP SCR ACET SNN SPHERICAL HEAD 6.5X20MM 71332520: Type: IMPLANTABLE DEVICE | Site: HIP | Status: FUNCTIONAL

## 2025-07-11 DEVICE — R3 3 HOLE ACETABULAR SHELL 50MM
Type: IMPLANTABLE DEVICE | Site: HIP | Status: FUNCTIONAL
Brand: R3 ACETABULAR

## 2025-07-11 DEVICE — R3 0 DEG XLPE ACET LNR 36MM ID X                                    OD 50MM
Type: IMPLANTABLE DEVICE | Site: HIP | Status: FUNCTIONAL
Brand: R3

## 2025-07-11 RX ORDER — ONDANSETRON 2 MG/ML
4 INJECTION INTRAMUSCULAR; INTRAVENOUS EVERY 30 MIN PRN
Status: DISCONTINUED | OUTPATIENT
Start: 2025-07-11 | End: 2025-07-11 | Stop reason: HOSPADM

## 2025-07-11 RX ORDER — FENTANYL CITRATE 50 UG/ML
25 INJECTION, SOLUTION INTRAMUSCULAR; INTRAVENOUS EVERY 5 MIN PRN
Status: DISCONTINUED | OUTPATIENT
Start: 2025-07-11 | End: 2025-07-11 | Stop reason: HOSPADM

## 2025-07-11 RX ORDER — ALBUTEROL SULFATE 90 UG/1
1-2 INHALANT RESPIRATORY (INHALATION) EVERY 4 HOURS PRN
Status: CANCELLED | OUTPATIENT
Start: 2025-07-11

## 2025-07-11 RX ORDER — ACETAMINOPHEN 325 MG/1
650 TABLET ORAL EVERY 4 HOURS PRN
Qty: 100 TABLET | Refills: 0 | Status: SHIPPED | OUTPATIENT
Start: 2025-07-11

## 2025-07-11 RX ORDER — MAGNESIUM HYDROXIDE 1200 MG/15ML
LIQUID ORAL PRN
Status: DISCONTINUED | OUTPATIENT
Start: 2025-07-11 | End: 2025-07-11 | Stop reason: HOSPADM

## 2025-07-11 RX ORDER — NALOXONE HYDROCHLORIDE 0.4 MG/ML
0.1 INJECTION, SOLUTION INTRAMUSCULAR; INTRAVENOUS; SUBCUTANEOUS
Status: DISCONTINUED | OUTPATIENT
Start: 2025-07-11 | End: 2025-07-11 | Stop reason: HOSPADM

## 2025-07-11 RX ORDER — OXYCODONE HYDROCHLORIDE 5 MG/1
5 TABLET ORAL EVERY 4 HOURS PRN
Status: DISCONTINUED | OUTPATIENT
Start: 2025-07-11 | End: 2025-07-11 | Stop reason: HOSPADM

## 2025-07-11 RX ORDER — POLYETHYLENE GLYCOL 3350 17 G/17G
17 POWDER, FOR SOLUTION ORAL DAILY
Status: CANCELLED | OUTPATIENT
Start: 2025-07-12

## 2025-07-11 RX ORDER — OXYCODONE HYDROCHLORIDE 5 MG/1
10 TABLET ORAL EVERY 4 HOURS PRN
Status: DISCONTINUED | OUTPATIENT
Start: 2025-07-11 | End: 2025-07-11 | Stop reason: HOSPADM

## 2025-07-11 RX ORDER — ACETAMINOPHEN 325 MG/1
975 TABLET ORAL ONCE
Status: COMPLETED | OUTPATIENT
Start: 2025-07-11 | End: 2025-07-11

## 2025-07-11 RX ORDER — FENTANYL CITRATE 50 UG/ML
50 INJECTION, SOLUTION INTRAMUSCULAR; INTRAVENOUS EVERY 5 MIN PRN
Status: DISCONTINUED | OUTPATIENT
Start: 2025-07-11 | End: 2025-07-11 | Stop reason: HOSPADM

## 2025-07-11 RX ORDER — HYDROMORPHONE HCL IN WATER/PF 6 MG/30 ML
0.4 PATIENT CONTROLLED ANALGESIA SYRINGE INTRAVENOUS EVERY 5 MIN PRN
Status: DISCONTINUED | OUTPATIENT
Start: 2025-07-11 | End: 2025-07-11 | Stop reason: HOSPADM

## 2025-07-11 RX ORDER — CEPHALEXIN 500 MG/1
500 CAPSULE ORAL
Qty: 1 CAPSULE | Refills: 0 | Status: SHIPPED | OUTPATIENT
Start: 2025-07-11

## 2025-07-11 RX ORDER — ONDANSETRON 4 MG/1
4 TABLET, ORALLY DISINTEGRATING ORAL EVERY 30 MIN PRN
Status: DISCONTINUED | OUTPATIENT
Start: 2025-07-11 | End: 2025-07-11 | Stop reason: HOSPADM

## 2025-07-11 RX ORDER — ONDANSETRON 2 MG/ML
4 INJECTION INTRAMUSCULAR; INTRAVENOUS EVERY 6 HOURS PRN
Status: DISCONTINUED | OUTPATIENT
Start: 2025-07-11 | End: 2025-07-11 | Stop reason: HOSPADM

## 2025-07-11 RX ORDER — DEXAMETHASONE SODIUM PHOSPHATE 10 MG/ML
4 INJECTION, SOLUTION INTRAMUSCULAR; INTRAVENOUS
Status: DISCONTINUED | OUTPATIENT
Start: 2025-07-11 | End: 2025-07-11 | Stop reason: HOSPADM

## 2025-07-11 RX ORDER — DEXAMETHASONE SODIUM PHOSPHATE 4 MG/ML
4 INJECTION, SOLUTION INTRA-ARTICULAR; INTRALESIONAL; INTRAMUSCULAR; INTRAVENOUS; SOFT TISSUE
Status: DISCONTINUED | OUTPATIENT
Start: 2025-07-11 | End: 2025-07-11 | Stop reason: HOSPADM

## 2025-07-11 RX ORDER — POLYETHYLENE GLYCOL 3350 17 G/17G
1 POWDER, FOR SOLUTION ORAL DAILY
Qty: 14 PACKET | Refills: 0 | Status: SHIPPED | OUTPATIENT
Start: 2025-07-11

## 2025-07-11 RX ORDER — LIDOCAINE 40 MG/G
CREAM TOPICAL
Status: DISCONTINUED | OUTPATIENT
Start: 2025-07-11 | End: 2025-07-11 | Stop reason: HOSPADM

## 2025-07-11 RX ORDER — FENTANYL CITRATE 50 UG/ML
25 INJECTION, SOLUTION INTRAMUSCULAR; INTRAVENOUS
Status: DISCONTINUED | OUTPATIENT
Start: 2025-07-11 | End: 2025-07-11 | Stop reason: HOSPADM

## 2025-07-11 RX ORDER — HYDROMORPHONE HCL IN WATER/PF 6 MG/30 ML
0.2 PATIENT CONTROLLED ANALGESIA SYRINGE INTRAVENOUS
Status: DISCONTINUED | OUTPATIENT
Start: 2025-07-11 | End: 2025-07-11 | Stop reason: HOSPADM

## 2025-07-11 RX ORDER — ONDANSETRON 4 MG/1
4 TABLET, ORALLY DISINTEGRATING ORAL EVERY 8 HOURS PRN
Qty: 10 TABLET | Refills: 0 | Status: SHIPPED | OUTPATIENT
Start: 2025-07-11

## 2025-07-11 RX ORDER — AMOXICILLIN 250 MG
1 CAPSULE ORAL 2 TIMES DAILY
Status: CANCELLED | OUTPATIENT
Start: 2025-07-11

## 2025-07-11 RX ORDER — ACETAMINOPHEN 325 MG/1
975 TABLET ORAL ONCE
Status: DISCONTINUED | OUTPATIENT
Start: 2025-07-11 | End: 2025-07-11 | Stop reason: HOSPADM

## 2025-07-11 RX ORDER — CEFAZOLIN SODIUM/WATER 2 G/20 ML
2 SYRINGE (ML) INTRAVENOUS
Status: COMPLETED | OUTPATIENT
Start: 2025-07-11 | End: 2025-07-11

## 2025-07-11 RX ORDER — SODIUM CHLORIDE, SODIUM LACTATE, POTASSIUM CHLORIDE, CALCIUM CHLORIDE 600; 310; 30; 20 MG/100ML; MG/100ML; MG/100ML; MG/100ML
INJECTION, SOLUTION INTRAVENOUS CONTINUOUS
Status: DISCONTINUED | OUTPATIENT
Start: 2025-07-11 | End: 2025-07-11 | Stop reason: HOSPADM

## 2025-07-11 RX ORDER — CEFAZOLIN SODIUM/WATER 2 G/20 ML
2 SYRINGE (ML) INTRAVENOUS EVERY 8 HOURS
Status: DISCONTINUED | OUTPATIENT
Start: 2025-07-11 | End: 2025-07-11

## 2025-07-11 RX ORDER — HYDROMORPHONE HCL IN WATER/PF 6 MG/30 ML
0.4 PATIENT CONTROLLED ANALGESIA SYRINGE INTRAVENOUS
Status: DISCONTINUED | OUTPATIENT
Start: 2025-07-11 | End: 2025-07-11 | Stop reason: HOSPADM

## 2025-07-11 RX ORDER — CEFAZOLIN SODIUM 2 G/50ML
2 SOLUTION INTRAVENOUS SEE ADMIN INSTRUCTIONS
Status: DISCONTINUED | OUTPATIENT
Start: 2025-07-11 | End: 2025-07-11 | Stop reason: HOSPADM

## 2025-07-11 RX ORDER — SODIUM CHLORIDE, SODIUM LACTATE, POTASSIUM CHLORIDE, CALCIUM CHLORIDE 600; 310; 30; 20 MG/100ML; MG/100ML; MG/100ML; MG/100ML
INJECTION, SOLUTION INTRAVENOUS CONTINUOUS
Status: CANCELLED | OUTPATIENT
Start: 2025-07-11

## 2025-07-11 RX ORDER — METHOCARBAMOL 750 MG/1
750 TABLET, FILM COATED ORAL EVERY 6 HOURS PRN
Status: DISCONTINUED | OUTPATIENT
Start: 2025-07-11 | End: 2025-07-11 | Stop reason: HOSPADM

## 2025-07-11 RX ORDER — OXYCODONE HYDROCHLORIDE 5 MG/1
5-10 TABLET ORAL EVERY 4 HOURS PRN
Qty: 26 TABLET | Refills: 0 | Status: SHIPPED | OUTPATIENT
Start: 2025-07-11

## 2025-07-11 RX ORDER — EPINEPHRINE 1 MG/ML
INJECTION, SOLUTION INTRAMUSCULAR; SUBCUTANEOUS
Status: DISCONTINUED
Start: 2025-07-11 | End: 2025-07-11 | Stop reason: HOSPADM

## 2025-07-11 RX ORDER — ASPIRIN 81 MG/1
81 TABLET ORAL 2 TIMES DAILY
Qty: 60 TABLET | Refills: 0 | Status: SHIPPED | OUTPATIENT
Start: 2025-07-11

## 2025-07-11 RX ORDER — ACETAMINOPHEN 325 MG/1
975 TABLET ORAL EVERY 8 HOURS
Status: DISCONTINUED | OUTPATIENT
Start: 2025-07-11 | End: 2025-07-11 | Stop reason: HOSPADM

## 2025-07-11 RX ORDER — ONDANSETRON 4 MG/1
4 TABLET, ORALLY DISINTEGRATING ORAL EVERY 6 HOURS PRN
Status: DISCONTINUED | OUTPATIENT
Start: 2025-07-11 | End: 2025-07-11 | Stop reason: HOSPADM

## 2025-07-11 RX ORDER — OXYCODONE HYDROCHLORIDE 5 MG/1
5 TABLET ORAL
Status: COMPLETED | OUTPATIENT
Start: 2025-07-11 | End: 2025-07-11

## 2025-07-11 RX ORDER — LIDOCAINE 40 MG/G
CREAM TOPICAL
Status: CANCELLED | OUTPATIENT
Start: 2025-07-11

## 2025-07-11 RX ORDER — HYDROXYZINE HYDROCHLORIDE 25 MG/1
25 TABLET, FILM COATED ORAL EVERY 6 HOURS PRN
Status: DISCONTINUED | OUTPATIENT
Start: 2025-07-11 | End: 2025-07-11 | Stop reason: HOSPADM

## 2025-07-11 RX ORDER — BISACODYL 10 MG
10 SUPPOSITORY, RECTAL RECTAL DAILY PRN
Status: CANCELLED | OUTPATIENT
Start: 2025-07-11

## 2025-07-11 RX ORDER — HYDROMORPHONE HCL IN WATER/PF 6 MG/30 ML
0.2 PATIENT CONTROLLED ANALGESIA SYRINGE INTRAVENOUS EVERY 5 MIN PRN
Status: DISCONTINUED | OUTPATIENT
Start: 2025-07-11 | End: 2025-07-11 | Stop reason: HOSPADM

## 2025-07-11 RX ORDER — ASPIRIN 81 MG/1
81 TABLET ORAL 2 TIMES DAILY
Status: CANCELLED | OUTPATIENT
Start: 2025-07-11

## 2025-07-11 RX ORDER — OXYCODONE HYDROCHLORIDE 5 MG/1
10 TABLET ORAL
Status: DISCONTINUED | OUTPATIENT
Start: 2025-07-11 | End: 2025-07-11 | Stop reason: HOSPADM

## 2025-07-11 RX ORDER — TRANEXAMIC ACID 650 MG/1
1950 TABLET ORAL ONCE
Status: COMPLETED | OUTPATIENT
Start: 2025-07-11 | End: 2025-07-11

## 2025-07-11 RX ORDER — AMOXICILLIN 250 MG
1-2 CAPSULE ORAL 2 TIMES DAILY PRN
Qty: 30 TABLET | Refills: 0 | Status: SHIPPED | OUTPATIENT
Start: 2025-07-11

## 2025-07-11 RX ORDER — CEFAZOLIN SODIUM/WATER 2 G/20 ML
2 SYRINGE (ML) INTRAVENOUS SEE ADMIN INSTRUCTIONS
Status: DISCONTINUED | OUTPATIENT
Start: 2025-07-11 | End: 2025-07-11 | Stop reason: HOSPADM

## 2025-07-11 RX ADMIN — FENTANYL CITRATE 25 MCG: 50 INJECTION INTRAMUSCULAR; INTRAVENOUS at 09:55

## 2025-07-11 RX ADMIN — FENTANYL CITRATE 25 MCG: 50 INJECTION INTRAMUSCULAR; INTRAVENOUS at 09:43

## 2025-07-11 RX ADMIN — OXYCODONE HYDROCHLORIDE 5 MG: 5 TABLET ORAL at 10:59

## 2025-07-11 RX ADMIN — ACETAMINOPHEN 975 MG: 325 TABLET ORAL at 12:32

## 2025-07-11 RX ADMIN — TRANEXAMIC ACID 1950 MG: 650 TABLET ORAL at 05:59

## 2025-07-11 RX ADMIN — FENTANYL CITRATE 25 MCG: 50 INJECTION INTRAMUSCULAR; INTRAVENOUS at 10:13

## 2025-07-11 RX ADMIN — ACETAMINOPHEN 975 MG: 325 TABLET ORAL at 05:59

## 2025-07-11 RX ADMIN — CEFAZOLIN SODIUM 2 G: 2 SOLUTION INTRAVENOUS at 13:36

## 2025-07-11 RX ADMIN — SODIUM CHLORIDE, SODIUM LACTATE, POTASSIUM CHLORIDE, AND CALCIUM CHLORIDE: .6; .31; .03; .02 INJECTION, SOLUTION INTRAVENOUS at 06:17

## 2025-07-11 RX ADMIN — FENTANYL CITRATE 25 MCG: 50 INJECTION INTRAMUSCULAR; INTRAVENOUS at 10:07

## 2025-07-11 ASSESSMENT — ACTIVITIES OF DAILY LIVING (ADL)
ADLS_ACUITY_SCORE: 33
ADLS_ACUITY_SCORE: 29
ADLS_ACUITY_SCORE: 33
ADLS_ACUITY_SCORE: 31
ADLS_ACUITY_SCORE: 29
ADLS_ACUITY_SCORE: 32
ADLS_ACUITY_SCORE: 29
ADLS_ACUITY_SCORE: 33
ADLS_ACUITY_SCORE: 29

## 2025-07-11 NOTE — OR NURSING
Bone fragments and tissue from arthroplasty disposed of in OR and placed in yellow hazard bag, per policy. Bone and tissue not needed to be sent to pathology, per surgeon.

## 2025-07-11 NOTE — BRIEF OP NOTE
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 Item Serial No.  Lot No. LRB No. Used Action   LINER ACTB R3 0D 50MM 36MM XLPE HIP STRL LF [id number] - [id number] Total Joint Component/Insert LINER ACTB R3 0D 50MM 36MM XLPE HIP STRL  [id number]  [employer] [id number] Right 1 Implanted   IMP SHELL SNR ACET R3 3H 50MM [id number] - [id number] Total Joint Component/Insert IMP SHELL SNR ACET R3 3H 50MM [id number]  [employer] [id number] Right 1 Implanted   IMP SCR ACET SNN SPHERICAL HEAD 6.5X20MM [id number] - [id number] Metallic Hardware/[place] IMP SCR ACET SNN SPHERICAL HEAD 6.5X20MM [id number]  [employer] [id number] Right 1 Implanted   IMP SCR ACET SNN SPHERICAL HEAD 6.5X40MM [id number] - [id number] Metallic Hardware/[place] IMP SCR ACET SNN SPHERICAL HEAD 6.5X40MM [id number]  [employer] [id number] Right 1 Implanted   IMP STEM S&N POLARSTEM STD TI/HA SZ3 NON PATRICIA [id number] - [id number] Total Joint Component/Insert IMP STEM S&N POLARSTEM STD TI/HA SZ3 NON PATRICIA [id number]  [employer] [id number] Right 1 Implanted   IMP HEAD FEMORAL SNN 12/14 OXIN 36MM +4MM [id number] - [id number] Total Joint Component/Insert IMP HEAD FEMORAL SNN 12/14 OXIN 36MM +4MM [id number]  [employer] [id number] Right 1 Implanted         Intra-op Labs/Cxs/Specimens:   * No specimens in log *    Drains: none  Complications: No apparent complications during procedure  Findings: Please see dictated operative note for details    Disposition: Stable to PACU    POST OPERATIVE  PLAN    Assessment/Plan: Oumou Carolina is a 60 year old female s/p Procedure(s):  ARTHROPLASTY, HIP, TOTAL on 7/11/2025 with Dr. Dowell.    Activity: Up with assist and assistive devices as needed until independent. Posterior hip precautions to operative side x 3 months:  1) No hip flexion beyond 90 degrees  2) No adduction beyond midline  3) No internal rotation beyond neutral   Weight bearing status: WBAT  Antibiotics: IV antibiotics, second dose to be given prior to discharge if cleared for same day discharge, then one dose orals on AM POD #1  Diet: Begin with clear fluids and progress diet as tolerated. Bowel regimen. Anti-emetics PRN.    DVT prophylaxis:  mg x 4 weeks beginning POD 1   Elevation: Elevate heels off of bed on pillows   Wound Care: Silver Mepilex border x 10-14 days. Okay to shower over dressing. No submerging in tub or pool.    Drains: none  Pain management: Orals PRN, IV for breakthrough only  X-rays: AP Pelvis and Lateral operative hip in PACU.   Physical Therapy: Assess in PACU for Mobilization, ROM, ADL's, Posterior hip precautions and fast track safety  Occupational Therapy: ADL's  Labs: If admitted for observation, trend Hgb on POD #1 and prn  Consults: PT, OT, appreciate assistance in caring for this patient throughout the perioperative period    Future Appointments   Date Time Provider Department Center   7/11/2025  9:45 AM Health system DR MACIAS 1 [facility]   7/11/2025  1:00 PM [facility]   7/18/2025 11:40 AM Magy Bright, PT [facility] [place]   7/24/2025 11:00 AM Magy Bright, PT [facility] [place]   7/29/2025 10:00 AM aMry Mcarthur PA-C [facility] [place]   7/31/2025 11:40 AM Emma De La Garza, PT [facility] [place]   8/19/2025  2:20 PM Andrade Dowell MD [facility] [place]   8/28/2025  7:30 AM ERMA1 [facility] [place]   10/2/2025  8:00 AM Shona Nicole PA-C [facility]       Disposition: Plan for discharge POD #0 if patient  passes Fast Track protocol. Notify Orthopedics if patient requires overnight stay for observation.     I assisted with positioning, prepping and draping, and closure.      Mary Mcarthur PA-C 7/11/2025 9:42 AM  Orthopedic Surgery

## 2025-07-11 NOTE — PHARMACY-ADMISSION MEDICATION HISTORY
Pharmacist completed medication history with the patient while in the [facility].  Prior to admission (PTA) med list completed and updated in the electronic medical record (EMR).  Pharmacy Note - Admission Medication History  Pertinent Provider Information: none   ______________________________________________________________________  Prior To Admission (PTA) med list completed and updated in EMR.     PTA Med List   Medication Sig Last Dose/Taking    albuterol (PROAIR HFA) 108 (90 Base) MCG/ACT inhaler Inhale 1-2 puffs into the lungs every 4 hours as needed for shortness of breath / dyspnea or wheezing More than a month    ibuprofen (ADVIL/MOTRIN) 200 MG tablet Take 200-400 mg by mouth every 4 hours as needed for pain. Stopping 7/4/25 before surgery 7/4/2025         Information source(s): Patient and CareEverywhere/SureScripts  Patient was asked about OTC/herbal products specifically.  PTA med list reflects this.  Based on the pharmacist s assessment, the PTA med list information appears reliable  Allergies were reviewed and assessed with the patient, responses were updated in the EMR.    Thank you for the opportunity to participate in the care of this patient.    Sriram Lainez RPH 7/11/2025 7:02 AM

## 2025-07-11 NOTE — OP NOTE
OPERATIVE REPORT    DATE OF SERVICE: 7/11/25    SURGEON: Andrade Dowell MD.    ASSISTANT(S):  Mary Mcarthur PA-C The assistance of the Physician Assistant was necessary for positioning, draping, retraction, leg positioning for placement of implants, and layered closure. There was no qualified available resident or fellow who was aware of the intricies of the procedure and requirements of all portions of the procedure.      PREOPERATIVE DIAGNOSIS:  Osteoarthritis    POSTOPERATIVE DIAGNOSIS:  Osteoarthritis    OPERATION PERFORMED:  right total hip arthroplasty    IMPLANTS:    Implant Name Type Inv. Item Serial No.  Lot No. LRB No. Used Action   LINER ACTB R3 0D 50MM 36MM XLPE HIP STRL LF [id number] - [id number] Total Joint Component/Insert LINER ACTB R3 0D 50MM 36MM XLPE HIP STRL LF [id number]  [employer] [id number] Right 1 Implanted   IMP SHELL SNR ACET R3 3H 50MM [id number] - [id number] Total Joint Component/Insert IMP SHELL SNR ACET R3 3H 50MM [id number]  [employer] [id number] Right 1 Implanted   IMP SCR ACET SNN SPHERICAL HEAD 6.5X20MM [id number] - [id number] Metallic Hardware/[place] IMP SCR ACET SNN SPHERICAL HEAD 6.5X20MM [id number]  [employer] [id number] Right 1 Implanted   IMP SCR ACET SNN SPHERICAL HEAD 6.5X40MM [id number] - [id number] Metallic Hardware/[place] IMP SCR ACET SNN SPHERICAL HEAD 6.5X40MM [id number]  [employer] [id number] Right 1 Implanted   IMP STEM S&N POLARSTEM STD TI/HA SZ3 NON PATRICIA [id number] - [id number] Total Joint Component/Insert IMP STEM S&N POLARSTEM STD TI/HA SZ3 NON PATRICIA [id number]  [employer] [id number] Right 1 Implanted   IMP HEAD FEMORAL SNN 12/14 OXIN 36MM +4MM [id number] - [id number] Total Joint Component/Insert IMP HEAD FEMORAL SNN 12/14 OXIN 36MM +4MM [id number]  [employer] [id number] Right 1 Implanted       ANESTHETIC: spinal     OPERATIVE FINDINGS:  End stage arthrosis of the hip    BLOOD LOSS about 100 cc    COMPLICATIONS:  None  apparent    OPERATIVE INDICATIONS:  The patient has a long history of debilitating pain secondary to ostearthritis of the hip.  Despite comprehensive non-operative management these symptoms continued to interfere with activities of daily living.  After discussion of further treatment options including the risks and benefits that patient elected to proceed with a total hip.    DESCRIPTION OF THE PROCEDURE:  The patient was identified in the preoperative holding area.  The consent form including the risks and benefits were reviewed with the patient.  The operative limb was identified and marked.  The patient was brought back to the operating room and placed supine on the operating table.  An anesthetic was induced by the anesthesia team.   The patient was placed in the lateral decubitus position and prepped and draped in the normal standard fashion for a hip replacement.  A time-out was called.  Antibiotics were given.  We utilized an approximately 15 cm curvilinear incision, centered on the vastus ridge, and performed a standard posterior approach to the hip.  The tensor fascia was split.  A small portion of gluteus lizzette was split in line with its fibers.  The sciatic nerve was palpated.  The east-west retractor was placed.  The posterior border of gluteus medius was exposed and retracted.  The tendon of piriformis and that of the obturators was released from their attachments.  A trapdoor posterior capsulotomy was performed.  The hip was dislocated.  The lesser trochanter was exposed.  A ruler was used to measure and electrocautery was used to imelda our neck cut as preoperatively templated at 22 mm.  The head was measured with a caliper and found to be 46 mm.  This measurement was used to choose our first reamer.  The neck cut was re-measured. The femur was elevated.  A Hohmann was placed over the anterior rim of the acetabulum and the femur was subluxed anterior.  A split was made in the inferior capsule.  The  "transverse acetabular ligament was left intact and used a guide for the anterversion of the acetabular component.  Circumferential retractors were placed.  We began reaming and went up by two until sufficient contact was made with the acetabular rim.  We then went up by one millimeter for a one millimeter press-fit.  We were within one size of our preoperative plan.   A trail was placed.  It had an excellent press fit.  We then placed out final component in 40 degrees of inclination and approximately 20 degrees of anteversion, parallel to the transverse ligament.  The press fit was excellent.  Screws were placed for additional initial fixation.  A flat liner was then placed. It locked into place.  Attention was turned to the femur.  Retractors were placed to elevated the proximal femur and to protect the tendon of gluteus medius.  Remaining lateral neck was removed and the piriformis fossa was cleared of soft-tissue.  A box osteotome and canal finder were used to prepare for broaching.  A sharp broach was used to lateralize slightly.  We then broached up sequentially to a size 3.  It was rotationally stable and sat up 1-2 millimeters from the neck-cut.  Preoperatively the patient had templated to a standard offset stem.  The standard offset stem appropriately tensioned the abductors.  We trialed the following femoral heads: 0 and +4.  The +4 appropriately tensioned the abductors and clinically equalized the leg-lengths.  The stability exam was excellent.  The hip was stable and there was no impingement posteriorly with hyper-extension and maximal external rotation.  With full extension, the knee could be fixed to bring the foot nearly to the buttock.  With the hip in ninety degrees of flexion and neutral rotation there was greater than 60 degrees of internal rotation before subluxation.  There appropriate movement with a \"rizwana\" test.  Happy with our stability exam, the final implant was placed in approximately " twenty degrees of anteversion.  It sat within 1  mm of the broach.  We then trailed with a +4 head.  The stability exam was identical.  We then placed the final head on a clean, dry neck and impacted it into place. The hip was reduced after directly visualizing the entire acetabulum.  The wound was then irrigated.  The posterior capsule was repaired to the anterior capsule and and short external rotators were sutured to their anatomic attachment on the greater trochanter with non-absorbable suture through bone tunnels.The fascia was closed with interrupted Vicryl, the dermis with interrupted Vicryl, and skin with running monocryl, Dermabond and steri-strips.  At the end of the procedure the sponge and needle counts were correct times two.  The patient tolerated the procedure well and returned to the PAR extubated and stable.    POSTOPERATIVE PLAN:  1. Weight bearing as tolerated  2. Standard posterior hip precautions  3. DVT prophylaxis   4. 24 hours of prophylactic antibiotics  5. Follow-up:  Wound clinic in 2 weeks and with Delmer in clinic in 6 weeks for x-rays and a rehabilitation check.

## 2025-07-11 NOTE — PROGRESS NOTES
07/11/25 6365   Appointment Info   Signing Clinician's Name / Credentials (PT) RORY Bowie   Student Supervision Direct supervision provided;Therapy services provided with the co-signing licensed therapist guiding and directing the services, and providing the skilled judgement and assessment throughout the session   Quick Adds   Quick Adds Certification   Living Environment   People in Home alone   Current Living Arrangements house   Home Accessibility stairs to enter home   Number of Stairs, Main Entrance 3   Stair Railings, Main Entrance railing on left side (ascending)   Number of Stairs, Within Home, Primary none   Transportation Anticipated family or friend will provide   Living Environment Comments IND with ADLs and iADLs at baseline. Parents will be staying with her for first few nights   Self-Care   Usual Activity Tolerance good   Current Activity Tolerance moderate   Equipment Currently Used at Home grab bar, tub/shower;raised toilet seat;shower chair;grab bar, toilet;cane, straight;walker, rolling   Fall history within last six months no   General Information   Onset of Illness/Injury or Date of Surgery 07/11/25   Referring Physician Andrade Dowell MD   Patient/Family Therapy Goals Statement (PT) To return home with parents   Pertinent History of Current Problem (include personal factors and/or comorbidities that impact the POC) s/p R CASTILLO   Existing Precautions/Restrictions no hip IR;no hip ADD past midline;90 degree hip flexion;weight bearing   Weight-Bearing Status - LLE weight-bearing as tolerated   Weight-Bearing Status - RLE weight-bearing as tolerated   Cognition   Affect/Mental Status (Cognition) WFL   Orientation Status (Cognition) oriented x 4   Range of Motion (ROM)   Range of Motion ROM is WFL   Strength (Manual Muscle Testing)   Strength (Manual Muscle Testing) strength is WFL   Transfers   Transfers sit-stand transfer   Maintains Weight-bearing Status (Transfers) able to maintain    Sit-Stand Transfer   Sit-Stand Walker (Transfers) contact guard   Assistive Device (Sit-Stand Transfers) walker, front-wheeled   Gait/Stairs (Locomotion)   Walker Level (Gait) contact guard   Assistive Device (Gait) walker, front-wheeled   Distance in Feet (Gait) 10   Clinical Impression   Criteria for Skilled Therapeutic Intervention Yes, treatment indicated   PT Diagnosis (PT) impaired functional mobility   Influenced by the following impairments pain weakness   Functional limitations due to impairments gait, stairs, transfers   Clinical Presentation (PT Evaluation Complexity) stable   Clinical Presentation Rationale per clinical judgement   Clinical Decision Making (Complexity) low complexity   Planned Therapy Interventions (PT) balance training;gait training;home exercise program;patient/family education;postural re-education;ROM (range of motion);stair training;strengthening;stretching;transfer training;progressive activity/exercise;risk factor education;home program guidelines   Risk & Benefits of therapy have been explained evaluation/treatment results reviewed;care plan/treatment goals reviewed;risks/benefits reviewed;current/potential barriers reviewed;participants voiced agreement with care plan;participants included;patient;mother;father   PT Total Evaluation Time   PT Eval, Low Complexity Minutes (01097) 10   Therapy Certification   Start of care date 07/11/25   Certification date from 07/11/25   Certification date to 07/11/25   Medical Diagnosis CASTILLO   Physical Therapy Goals   PT Frequency One time eval and treatment only   PT Predicted Duration/Target Date for Goal Attainment 07/11/25   PT Goals Transfers;Gait;Stairs;PT Goal 1   PT: Transfers Sit to/from stand;Supervision/stand-by assist;Assistive device;Within precautions;Goal Met   PT: Gait Supervision/stand-by assist;Rolling walker;Within precautions;150 feet;Goal Met   PT: Stairs Supervision/stand-by assist;3 stairs;Within  precautions;Rail on left;Goal Met   PT: Goal 1 IND with HEP, goals met   Interventions   Interventions Quick Adds Gait Training;Therapeutic Procedure   Therapeutic Procedure/Exercise   Ther. Procedure: strength, endurance, ROM, flexibillity Minutes (57429) 15   Symptoms Noted During/After Treatment increased pain;fatigue   Treatment Detail/Skilled Intervention Pt instructed and completed x10 reps on all CASTILLO HEP, edu on posterior hip precautions to pt and familiy. Edu on walker height adjustment and adjusted current walker height. Edu on STS sequencing with FWW, hand placement and offloading. completed SBA STS w/ FWW began ambulation from chair. pt left call light in reach, questions about car transfers answered all needs met   Gait Training   Gait Training Minutes (27725) 18   Symptoms Noted During/After Treatment (Gait Training) fatigue;increased pain   Treatment Detail/Skilled Intervention ambulated OOR with CGA and FWW thoughout cues for safety and fww use. Ambulated to stairs, edu on non-recip step patten with fam assist for safety, pt completed nonrecip stairs with CGA safely with verbal cueing throughout with L ascending rail. ambulated back to room. Pt needing to stop in bathroom on way back, PT assisted with STS transfer from toilet, CGA as pt completed pericares. gait back to room, with SBA FWW   Distance in Feet 250   [place] Level (Gait Training) stand-by assist   Physical Assistance Level (Gait Training) supervision;verbal cues;1 person assist   Weight Bearing (Gait Training) weight-bearing as tolerated   Assistive Device (Gait Training) rolling walker   Pattern Analysis (Gait Training) swing-through gait   Gait Analysis Deviations decreased oziel;decreased step length;decreased stride length   Impairments (Gait Analysis/Training) pain;ROM decreased;strength decreased   Stair Railings present on left side   Physical Assist/Nonphysical Assist (Stairs) verbal cues;1 person assist   Level of  [place] (Stairs) contact guard   PT Discharge Planning   PT Plan PT DC   PT Discharge Recommendation (DC Rec) other (see comments)  (defer to orhto)   PT Rationale for DC Rec ambulating well completed stairs, has parents assist at home has fww and ADs at home   PT Brief overview of current status Ax1 FWW   PT Total Distance Amb During Session (feet) 260   PT Equipment Needed at Discharge   (none)   Physical Therapy Time and Intention   Timed Code Treatment Minutes 33   Total Session Time (sum of timed and untimed services) 43     [facility]                                                                                   OUTPATIENT PHYSICAL THERAPY    PLAN OF TREATMENT FOR OUTPATIENT REHABILITATION   Patient's Last Name, First Name, Oumou Mcmullen YOB: 1965   Provider's Name   [facility]   Medical Record No.  [id number]     Onset Date: 07/11/25 Start of Care Date: 07/11/25     Medical Diagnosis:  CASTILLO               PT Diagnosis:  impaired functional mobility Certification Dates:  From: 07/11/25  To: 07/11/25       See note for plan of treatment, functional goals, and certification details.    I CERTIFY THE NEED FOR THESE SERVICES FURNISHED UNDER        THIS PLAN OF TREATMENT AND WHILE UNDER MY CARE (Physician co-signature of this document indicates review and certification of the therapy plan).

## 2025-07-12 NOTE — PROGRESS NOTES
Surgeon: Dr. Andrade Dowell    [facility]: [facility]   Name of Surgery: Right - total hip arthroplasty   Date of Surgery: 7/11/25    Hi,  I am a registered nurse calling from [facility] to check in and see how you are doing following your joint replacement surgery today.    1) Is your pain level manageable? Yes If not, have you been taking your pain medications as prescribed and have you tried icing and elevating your surgical extremity?   2) Are you having any new or increased numbness or tingling in your surgical leg? No  3) Are you having increased swelling around your surgical site? No.   4) Are you having any drainage from your incision? No  If so, is the drainage saturating or coming through your dressing? N/A  5) Have you been able to walk short distances around your home with a walker? Yes  6) Have you been able to urinate since surgery? Yes  7) If patient is a uni-compartmental-knee or total knee replacement, do they have outpatient physical therapy set up? Yes. If not, direct them to contact their surgeon's office to ask if they recommend outpatient physical therapy. Please note: most total hip replacement patients do not need outpatient physical therapy unless their surgeon specifically orders it.    7) Do you have any other questions or concerns at this time? No concerns at this time If patient has significant concerns after hours make sure they have appropriate after hours call number for their surgeon.      Miguel MACKENZIE RN

## 2025-07-18 PROBLEM — R26.89 IMPAIRED GAIT AND MOBILITY: Status: ACTIVE | Noted: 2025-07-18

## 2025-07-18 PROBLEM — Z96.641 STATUS POST TOTAL REPLACEMENT OF RIGHT HIP: Status: ACTIVE | Noted: 2025-07-18

## 2025-07-18 PROBLEM — M25.651 DECREASED RANGE OF RIGHT HIP MOVEMENT: Status: ACTIVE | Noted: 2025-07-18

## 2025-07-24 ENCOUNTER — THERAPY VISIT (OUTPATIENT)
Dept: PHYSICAL THERAPY | Facility: CLINIC | Age: 60
End: 2025-07-24
Attending: ORTHOPAEDIC SURGERY
Payer: COMMERCIAL

## 2025-07-24 DIAGNOSIS — Z96.641 STATUS POST TOTAL REPLACEMENT OF RIGHT HIP: Primary | ICD-10-CM

## 2025-07-24 DIAGNOSIS — R26.89 IMPAIRED GAIT AND MOBILITY: ICD-10-CM

## 2025-07-24 DIAGNOSIS — M25.651 DECREASED RANGE OF RIGHT HIP MOVEMENT: ICD-10-CM

## 2025-07-24 ASSESSMENT — HOOS JR
LYING IN BED (TURNING OVER, MAINTAINING HIP POSITION): MILD
GOING UP OR DOWN STAIRS: MILD
BENDING TO THE FLOOR TO PICK UP OBJECT: MILD
HOOS JR TOTAL INTERVAL SCORE: 70.43
RISING FROM SITTING: MILD
SITTING: MODERATE

## 2025-07-29 ENCOUNTER — OFFICE VISIT (OUTPATIENT)
Dept: ORTHOPEDICS | Facility: CLINIC | Age: 60
End: 2025-07-29
Payer: COMMERCIAL

## 2025-07-29 DIAGNOSIS — Z96.641 S/P TOTAL RIGHT HIP ARTHROPLASTY: Primary | ICD-10-CM

## 2025-07-29 PROCEDURE — 99024 POSTOP FOLLOW-UP VISIT: CPT | Performed by: PHYSICIAN ASSISTANT

## 2025-07-29 NOTE — PROGRESS NOTES
-- DO NOT REPLY / DO NOT REPLY ALL --  -- Message is from [facility] [employer] ([facility]) --    ONLY TO BE USED WITHIN A REFILL MEDICATION ENCOUNTER    Med Refill  Is the patient currently having any symptoms?: No/Non-Emergent symptoms    Name of medication requested: See pended med    Has patient contacted the pharmacy? Yes    Is this the first request for the medication in the last 48 hours?: Yes      Patient is requesting a medication refill - medication is on active list      Full name of the provider who ordered the medication: dr consuelo martinez    [facility] site name / Account # for provider: kristen guerrero    Preferred Pharmacy: Pharmacy  [id number] - [place], [place] - [address]    Chief Complaint:   Follow up, DOS 7/11/25 with Dr. Dowell    Procedures:  Right total hip arthroplasty    History:  Oumou Carolina is a 60 year old who presents to clinic for routine postop check 2 weeks from above surgery.  She is ambulating with a cane.  She states things have gone very well since surgery and is pleased with the care she received at [facility].  She has weaned to Tylenol alone for pain management.  She is taking aspirin as directed for DVT prophylaxis.  No concern for incision healing at this time.  She is hoping to get back to walking her dog when it is safe.  She is working with physical therapy.    Denies fevers, chills or sweats. Denies calf pain or tenderness, chest pain or shortness of breath.     Exam:    General: Awake, Alert, and oriented. Articulates and communicates with a normal affect     Right lower Extremity:  Ambulating with a cane independently  Incision is healing, well approximated without evidence of infection. There is no erythema, drainage, or wound dehiscence.  New Steri-Strips applied.  Thigh and calf compartments are soft and compressible  Range of motion: tolerates gentle PROM of hip  Active hip flexion against gravity in tact  TA/Gsc/EHL/FHL with 5/5 strength  Distal pulses palpable, toes are warm and well perfused   Calf soft and nontender  Edema no lower leg edema present      Imaging:  Radiographs of the right hip were ordered, performed and reviewed today including AP pelvis and cross table lateral views. These were independently reviewed by me and findings were discussed with the patient today. The imaging demonstrates total hip arthroplasty well-positioned without evidence of hardware complication or periprosthetic fracture.    Medications:     Current Outpatient Medications:     acetaminophen (TYLENOL) 325 MG tablet, Take 2 tablets (650 mg) by mouth every 4 hours as needed for other (mild pain)., Disp: 100 tablet, Rfl: 0    aspirin 81 MG EC tablet,  Take 1 tablet (81 mg) by mouth 2 times daily., Disp: 60 tablet, Rfl: 0    albuterol (PROAIR HFA) 108 (90 Base) MCG/ACT inhaler, Inhale 1-2 puffs into the lungs every 4 hours as needed for shortness of breath / dyspnea or wheezing, Disp: 18 g, Rfl: 0    cephALEXin (KEFLEX) 500 MG capsule, Take 1 capsule (500 mg) by mouth once as directed., Disp: 1 capsule, Rfl: 0    ondansetron (ZOFRAN ODT) 4 MG ODT tab, Take 1 tablet (4 mg) by mouth every 8 hours as needed for nausea. Dissolve ON the tongue., Disp: 10 tablet, Rfl: 0    oxyCODONE (ROXICODONE) 5 MG tablet, Take 1-2 tablets (5-10 mg) by mouth every 4 hours as needed for moderate to severe pain. (Patient not taking: Reported on 7/29/2025), Disp: 26 tablet, Rfl: 0    polyethylene glycol (MIRALAX) 17 g packet, Take 17 g by mouth daily., Disp: 14 packet, Rfl: 0    senna-docusate (SENOKOT-S/PERICOLACE) 8.6-50 MG tablet, Take 1-2 tablets by mouth 2 times daily as needed for constipation. Take while on oral narcotics to prevent or treat constipation., Disp: 30 tablet, Rfl: 0    Assessment:  Doing well 2 weeks s/p right total hip arthroplasty      Plan:   - Basic wound cares were performed today.  She was directed to refrain from submerging in a tub or a pool until incision is a well-healed scar, minimum 12 weeks from surgery. No lotions or ointments on incision while healing. Directed to call with any drainage or redness about the incision area.   - Reviewed importance of maintaining posterior hip precautions including no hip flexion > 90 degrees, no adduction past midline, no internal rotation/pivoting.   - Encouraged consistent work with PT for gait training and mobilization  -Reviewed signs and symptoms of DVT/PE including calf pain or tenderness, chest pain or shortness of breath and directed them to report to call in to clinic or report to ED with any concerns or if they symptoms occur.   -DVT prophylaxis: ASA 81 mg twice daily x 30 days  -Follow up: 6 weeks with    Delmer, No new x-rays unless concerns.     Mary Mcarthur PA-C   7/29/2025 10:27 AM  Orthopedic Surgery

## 2025-07-29 NOTE — NURSING NOTE
Reason For Visit:   Chief Complaint   Patient presents with    Right Hip - Follow Up     2 week post up follow up- Right hip arthroplasty   Patient reports she is feeling good, completing PT.       LMP  (LMP Unknown)          Fany Hou ATC

## 2025-07-29 NOTE — LETTER
7/29/2025      Oumou Carolina  [address] [facility] [address]      Dear Colleague,    Thank you for referring your patient, Oumou Carolina, to the [facility]. Please see a copy of my visit note below.    Chief Complaint:   Follow up, DOS 7/11/25 with Dr. Dowell    Procedures:  Right total hip arthroplasty    History:  Oumou Carolina is a 60 year old who presents to clinic for routine postop check 2 weeks from above surgery.  She is ambulating with a cane.  She states things have gone very well since surgery and is pleased with the care she received at [facility].  She has weaned to Tylenol alone for pain management.  She is taking aspirin as directed for DVT prophylaxis.  No concern for incision healing at this time.  She is hoping to get back to walking her dog when it is safe.  She is working with physical therapy.    Denies fevers, chills or sweats. Denies calf pain or tenderness, chest pain or shortness of breath.     Exam:    General: Awake, Alert, and oriented. Articulates and communicates with a normal affect     Right lower Extremity:  Ambulating with a cane independently  Incision is healing, well approximated without evidence of infection. There is no erythema, drainage, or wound dehiscence.  New Steri-Strips applied.  Thigh and calf compartments are soft and compressible  Range of motion: tolerates gentle PROM of hip  Active hip flexion against gravity in tact  TA/Gsc/EHL/FHL with 5/5 strength  Distal pulses palpable, toes are warm and well perfused   Calf soft and nontender  Edema no lower leg edema present      Imaging:  Radiographs of the right hip were ordered, performed and reviewed today including AP pelvis and cross table lateral views. These were independently reviewed by me and findings were discussed with the patient today. The imaging demonstrates total hip arthroplasty well-positioned without evidence of hardware complication or periprosthetic  fracture.    Medications:     Current Outpatient Medications:      acetaminophen (TYLENOL) 325 MG tablet, Take 2 tablets (650 mg) by mouth every 4 hours as needed for other (mild pain)., Disp: 100 tablet, Rfl: 0     aspirin 81 MG EC tablet, Take 1 tablet (81 mg) by mouth 2 times daily., Disp: 60 tablet, Rfl: 0     albuterol (PROAIR HFA) 108 (90 Base) MCG/ACT inhaler, Inhale 1-2 puffs into the lungs every 4 hours as needed for shortness of breath / dyspnea or wheezing, Disp: 18 g, Rfl: 0     cephALEXin (KEFLEX) 500 MG capsule, Take 1 capsule (500 mg) by mouth once as directed., Disp: 1 capsule, Rfl: 0     ondansetron (ZOFRAN ODT) 4 MG ODT tab, Take 1 tablet (4 mg) by mouth every 8 hours as needed for nausea. Dissolve ON the tongue., Disp: 10 tablet, Rfl: 0     oxyCODONE (ROXICODONE) 5 MG tablet, Take 1-2 tablets (5-10 mg) by mouth every 4 hours as needed for moderate to severe pain. (Patient not taking: Reported on 7/29/2025), Disp: 26 tablet, Rfl: 0     polyethylene glycol (MIRALAX) 17 g packet, Take 17 g by mouth daily., Disp: 14 packet, Rfl: 0     senna-docusate (SENOKOT-S/PERICOLACE) 8.6-50 MG tablet, Take 1-2 tablets by mouth 2 times daily as needed for constipation. Take while on oral narcotics to prevent or treat constipation., Disp: 30 tablet, Rfl: 0    Assessment:  Doing well 2 weeks s/p right total hip arthroplasty      Plan:   - Basic wound cares were performed today.  She was directed to refrain from submerging in a tub or a pool until incision is a well-healed scar, minimum 12 weeks from surgery. No lotions or ointments on incision while healing. Directed to call with any drainage or redness about the incision area.   - Reviewed importance of maintaining posterior hip precautions including no hip flexion > 90 degrees, no adduction past midline, no internal rotation/pivoting.   - Encouraged consistent work with PT for gait training and mobilization  -Reviewed signs and symptoms of DVT/PE including calf  pain or tenderness, chest pain or shortness of breath and directed them to report to call in to clinic or report to ED with any concerns or if they symptoms occur.   -DVT prophylaxis: ASA 81 mg twice daily x 30 days  -Follow up: 6 weeks with  Dr. Dowell, No new x-rays unless concerns.     Mary Mcarthur PA-C   7/29/2025 10:27 AM  Orthopedic Surgery      Again, thank you for allowing me to participate in the care of your patient.        Sincerely,        Mary Mcarthur PA-C    Electronically signed

## 2025-07-31 ENCOUNTER — THERAPY VISIT (OUTPATIENT)
Dept: PHYSICAL THERAPY | Facility: CLINIC | Age: 60
End: 2025-07-31
Attending: ORTHOPAEDIC SURGERY
Payer: COMMERCIAL

## 2025-07-31 DIAGNOSIS — M25.651 DECREASED RANGE OF RIGHT HIP MOVEMENT: ICD-10-CM

## 2025-07-31 DIAGNOSIS — Z96.641 STATUS POST TOTAL REPLACEMENT OF RIGHT HIP: Primary | ICD-10-CM

## 2025-07-31 DIAGNOSIS — R26.89 IMPAIRED GAIT AND MOBILITY: ICD-10-CM

## 2025-08-14 ENCOUNTER — THERAPY VISIT (OUTPATIENT)
Dept: PHYSICAL THERAPY | Facility: CLINIC | Age: 60
End: 2025-08-14
Payer: COMMERCIAL

## 2025-08-14 DIAGNOSIS — R26.89 IMPAIRED GAIT AND MOBILITY: ICD-10-CM

## 2025-08-14 DIAGNOSIS — Z96.641 STATUS POST TOTAL REPLACEMENT OF RIGHT HIP: Primary | ICD-10-CM

## 2025-08-14 DIAGNOSIS — M25.651 DECREASED RANGE OF RIGHT HIP MOVEMENT: ICD-10-CM

## 2025-08-16 ASSESSMENT — HOOS JR
RISING FROM SITTING: MILD
HOOS JR TOTAL INTERVAL SCORE: 73.47
SITTING: MODERATE
BENDING TO THE FLOOR TO PICK UP OBJECT: MILD
LYING IN BED (TURNING OVER, MAINTAINING HIP POSITION): MILD

## 2025-08-19 ENCOUNTER — OFFICE VISIT (OUTPATIENT)
Dept: ORTHOPEDICS | Facility: CLINIC | Age: 60
End: 2025-08-19
Payer: COMMERCIAL

## 2025-08-19 DIAGNOSIS — Z96.641 S/P TOTAL RIGHT HIP ARTHROPLASTY: Primary | ICD-10-CM

## 2025-08-19 PROCEDURE — 99024 POSTOP FOLLOW-UP VISIT: CPT | Performed by: PHYSICIAN ASSISTANT

## 2025-08-21 ENCOUNTER — THERAPY VISIT (OUTPATIENT)
Dept: PHYSICAL THERAPY | Facility: CLINIC | Age: 60
End: 2025-08-21
Payer: COMMERCIAL

## 2025-08-21 DIAGNOSIS — M25.651 DECREASED RANGE OF RIGHT HIP MOVEMENT: ICD-10-CM

## 2025-08-21 DIAGNOSIS — Z96.641 STATUS POST TOTAL REPLACEMENT OF RIGHT HIP: Primary | ICD-10-CM

## 2025-08-21 DIAGNOSIS — R26.89 IMPAIRED GAIT AND MOBILITY: ICD-10-CM

## (undated) DEVICE — SU ETHIBOND 5 V-37 4X30" MB66G

## (undated) DEVICE — NEEDLE SPINAL DISP 18GA X 3.5" QUINCKE 333350

## (undated) DEVICE — RETRIVER SUTURE LASSO HOFFEE BLUE 710000

## (undated) DEVICE — ESU ELEC BLADE 6" COATED E1450-6

## (undated) DEVICE — BLADE SAW RECIP STRK 60X6.27X0.64MM SHORT 0277-096-250

## (undated) DEVICE — DRSG STERI STRIP 1/2X4" R1547

## (undated) DEVICE — DRAPE IOBAN INCISE 36X23" 6651EZ

## (undated) DEVICE — GLOVE UNDER INDICATOR PI SZ 7.0 LF 41670

## (undated) DEVICE — SU DERMABOND ADVANCED .7ML DNX12

## (undated) DEVICE — POSITIONER ABDUCTION PILLOW FOAM MED APG 202

## (undated) DEVICE — SOL WATER IRRIG 1000ML BOTTLE 2F7114

## (undated) DEVICE — DRESSING MEPILEX AG SILVER 4X8 395890

## (undated) DEVICE — CUSTOM PACK TOTAL HIP SOP5BTHHEA

## (undated) DEVICE — HOOD SURG T7PLUS PEEL AWAY FACE SHIELD STRL LF 0416-801-100

## (undated) DEVICE — SOL NACL 0.9% INJ 1000ML BAG 2B1324X

## (undated) DEVICE — VIAL DECANTER STERILE WHITE DYNJDEC06

## (undated) DEVICE — GLOVE BIOGEL PI SZ 7.5 40875

## (undated) DEVICE — PULSE LAVAGE IRRIGATION SYSTEM 0210-114-100

## (undated) DEVICE — DRSG GAUZE FLUFTEX RADIOPAQUE 4.5"X4.1YD 11-020

## (undated) DEVICE — DRAPE CONVERTORS U-DRAPE 60X72" 8476

## (undated) DEVICE — SUCTION MANIFOLD NEPTUNE 2 SYS 4 PORT 0702-020-000

## (undated) DEVICE — Device

## (undated) DEVICE — GLOVE BIOGEL PI INDICATOR 8.0 LF 41680

## (undated) DEVICE — SUTURE VICRYL+ 2-0 27IN CT-1 UND VCP259H

## (undated) DEVICE — SOL NACL 0.9% IRRIG 1000ML BOTTLE 2F7124

## (undated) DEVICE — SUTURE MONOCRYL 3-0 18 PS2 UND MCP497G

## (undated) DEVICE — SUCTION SLEEVE NEPTUNE 2 165MM 0703-005-165

## (undated) DEVICE — SU VICRYL+ 0 27IN CT-1 UND VCP260H

## (undated) DEVICE — ELECTRODE PATIENT RETURN ADULT L10 FT 2 PLATE CORD 0855C

## (undated) DEVICE — GLOVE BIOGEL PI ULTRATOUCH G SZ 6.5 42165

## (undated) DEVICE — HOLDER LIMB VELCRO OR 0814-1533

## (undated) DEVICE — DRAPE SHEET REV FOLD 3/4 9349

## (undated) DEVICE — DRILL BIT FLEXIBLE REFLECTION 35MM 71362935

## (undated) RX ORDER — PROPOFOL 10 MG/ML
INJECTION, EMULSION INTRAVENOUS
Status: DISPENSED
Start: 2025-07-11

## (undated) RX ORDER — LIDOCAINE HYDROCHLORIDE 10 MG/ML
INJECTION, SOLUTION EPIDURAL; INFILTRATION; INTRACAUDAL; PERINEURAL
Status: DISPENSED
Start: 2025-07-11

## (undated) RX ORDER — PHENYLEPHRINE HYDROCHLORIDE 10 MG/ML
INJECTION INTRAVENOUS
Status: DISPENSED
Start: 2025-07-11

## (undated) RX ORDER — ONDANSETRON 2 MG/ML
INJECTION INTRAMUSCULAR; INTRAVENOUS
Status: DISPENSED
Start: 2025-07-11

## (undated) RX ORDER — DEXAMETHASONE SODIUM PHOSPHATE 10 MG/ML
INJECTION, EMULSION INTRAMUSCULAR; INTRAVENOUS
Status: DISPENSED
Start: 2025-07-11

## (undated) RX ORDER — FENTANYL CITRATE 50 UG/ML
INJECTION, SOLUTION INTRAMUSCULAR; INTRAVENOUS
Status: DISPENSED
Start: 2025-07-11